# Patient Record
Sex: MALE | Race: WHITE | NOT HISPANIC OR LATINO
[De-identification: names, ages, dates, MRNs, and addresses within clinical notes are randomized per-mention and may not be internally consistent; named-entity substitution may affect disease eponyms.]

---

## 2017-01-09 ENCOUNTER — APPOINTMENT (OUTPATIENT)
Dept: PSYCHIATRY | Facility: CLINIC | Age: 82
End: 2017-01-09

## 2017-01-23 ENCOUNTER — APPOINTMENT (OUTPATIENT)
Dept: PSYCHIATRY | Facility: CLINIC | Age: 82
End: 2017-01-23

## 2017-01-25 ENCOUNTER — APPOINTMENT (OUTPATIENT)
Dept: PSYCHIATRY | Facility: CLINIC | Age: 82
End: 2017-01-25

## 2017-01-30 ENCOUNTER — APPOINTMENT (OUTPATIENT)
Dept: PSYCHIATRY | Facility: CLINIC | Age: 82
End: 2017-01-30

## 2017-02-01 ENCOUNTER — APPOINTMENT (OUTPATIENT)
Dept: PSYCHIATRY | Facility: CLINIC | Age: 82
End: 2017-02-01

## 2017-02-06 ENCOUNTER — APPOINTMENT (OUTPATIENT)
Dept: PSYCHIATRY | Facility: CLINIC | Age: 82
End: 2017-02-06

## 2017-02-07 ENCOUNTER — APPOINTMENT (OUTPATIENT)
Dept: PSYCHIATRY | Facility: CLINIC | Age: 82
End: 2017-02-07

## 2017-02-13 ENCOUNTER — APPOINTMENT (OUTPATIENT)
Dept: PSYCHIATRY | Facility: CLINIC | Age: 82
End: 2017-02-13

## 2017-02-14 ENCOUNTER — APPOINTMENT (OUTPATIENT)
Dept: PSYCHIATRY | Facility: CLINIC | Age: 82
End: 2017-02-14

## 2017-02-15 ENCOUNTER — APPOINTMENT (OUTPATIENT)
Dept: PSYCHIATRY | Facility: CLINIC | Age: 82
End: 2017-02-15

## 2017-02-17 ENCOUNTER — OTHER (OUTPATIENT)
Age: 82
End: 2017-02-17

## 2017-02-17 RX ORDER — D-METHORPHAN/PE/ACETAMINOPHEN 5-325MG/15
10-5-325 LIQUID (ML) ORAL EVERY 4 HOURS
Qty: 1 | Refills: 2 | Status: ACTIVE | COMMUNITY
Start: 2017-02-17 | End: 1900-01-01

## 2017-02-17 RX ORDER — TRAZODONE HYDROCHLORIDE 50 MG/1
50 TABLET ORAL
Qty: 30 | Refills: 3 | Status: ACTIVE | COMMUNITY
Start: 2017-02-17 | End: 1900-01-01

## 2017-02-20 ENCOUNTER — APPOINTMENT (OUTPATIENT)
Dept: PSYCHIATRY | Facility: CLINIC | Age: 82
End: 2017-02-20

## 2017-02-22 ENCOUNTER — APPOINTMENT (OUTPATIENT)
Dept: PSYCHIATRY | Facility: CLINIC | Age: 82
End: 2017-02-22

## 2017-02-27 ENCOUNTER — APPOINTMENT (OUTPATIENT)
Dept: PSYCHIATRY | Facility: CLINIC | Age: 82
End: 2017-02-27

## 2017-03-01 ENCOUNTER — APPOINTMENT (OUTPATIENT)
Dept: PSYCHIATRY | Facility: CLINIC | Age: 82
End: 2017-03-01

## 2017-03-06 ENCOUNTER — APPOINTMENT (OUTPATIENT)
Dept: PSYCHIATRY | Facility: CLINIC | Age: 82
End: 2017-03-06

## 2017-03-08 ENCOUNTER — APPOINTMENT (OUTPATIENT)
Dept: PSYCHIATRY | Facility: CLINIC | Age: 82
End: 2017-03-08

## 2017-03-13 ENCOUNTER — APPOINTMENT (OUTPATIENT)
Dept: PSYCHIATRY | Facility: CLINIC | Age: 82
End: 2017-03-13

## 2017-03-15 ENCOUNTER — APPOINTMENT (OUTPATIENT)
Dept: PSYCHIATRY | Facility: CLINIC | Age: 82
End: 2017-03-15

## 2017-03-20 ENCOUNTER — OTHER (OUTPATIENT)
Age: 82
End: 2017-03-20

## 2017-03-20 ENCOUNTER — APPOINTMENT (OUTPATIENT)
Dept: PSYCHIATRY | Facility: CLINIC | Age: 82
End: 2017-03-20

## 2017-03-22 ENCOUNTER — APPOINTMENT (OUTPATIENT)
Dept: PSYCHIATRY | Facility: CLINIC | Age: 82
End: 2017-03-22

## 2017-03-27 ENCOUNTER — APPOINTMENT (OUTPATIENT)
Dept: PSYCHIATRY | Facility: CLINIC | Age: 82
End: 2017-03-27

## 2017-03-29 ENCOUNTER — APPOINTMENT (OUTPATIENT)
Dept: PSYCHIATRY | Facility: CLINIC | Age: 82
End: 2017-03-29

## 2017-04-03 ENCOUNTER — APPOINTMENT (OUTPATIENT)
Dept: PSYCHIATRY | Facility: CLINIC | Age: 82
End: 2017-04-03

## 2017-04-05 ENCOUNTER — APPOINTMENT (OUTPATIENT)
Dept: PSYCHIATRY | Facility: CLINIC | Age: 82
End: 2017-04-05

## 2017-04-10 ENCOUNTER — APPOINTMENT (OUTPATIENT)
Dept: PSYCHIATRY | Facility: CLINIC | Age: 82
End: 2017-04-10

## 2017-04-12 ENCOUNTER — APPOINTMENT (OUTPATIENT)
Dept: PSYCHIATRY | Facility: CLINIC | Age: 82
End: 2017-04-12

## 2017-04-17 ENCOUNTER — APPOINTMENT (OUTPATIENT)
Dept: PSYCHIATRY | Facility: CLINIC | Age: 82
End: 2017-04-17

## 2017-04-19 ENCOUNTER — APPOINTMENT (OUTPATIENT)
Dept: PSYCHIATRY | Facility: CLINIC | Age: 82
End: 2017-04-19

## 2017-04-24 ENCOUNTER — APPOINTMENT (OUTPATIENT)
Dept: PSYCHIATRY | Facility: CLINIC | Age: 82
End: 2017-04-24

## 2017-04-26 ENCOUNTER — APPOINTMENT (OUTPATIENT)
Dept: PSYCHIATRY | Facility: CLINIC | Age: 82
End: 2017-04-26

## 2017-05-01 ENCOUNTER — APPOINTMENT (OUTPATIENT)
Dept: PSYCHIATRY | Facility: CLINIC | Age: 82
End: 2017-05-01

## 2017-05-01 ENCOUNTER — OUTPATIENT (OUTPATIENT)
Dept: OUTPATIENT SERVICES | Facility: HOSPITAL | Age: 82
LOS: 1 days | Discharge: HOME | End: 2017-05-01

## 2017-05-02 DIAGNOSIS — F41.1 GENERALIZED ANXIETY DISORDER: ICD-10-CM

## 2017-05-03 ENCOUNTER — APPOINTMENT (OUTPATIENT)
Dept: PSYCHIATRY | Facility: CLINIC | Age: 82
End: 2017-05-03

## 2017-05-08 ENCOUNTER — APPOINTMENT (OUTPATIENT)
Dept: PSYCHIATRY | Facility: CLINIC | Age: 82
End: 2017-05-08

## 2017-05-10 ENCOUNTER — APPOINTMENT (OUTPATIENT)
Dept: PSYCHIATRY | Facility: CLINIC | Age: 82
End: 2017-05-10

## 2017-05-15 ENCOUNTER — APPOINTMENT (OUTPATIENT)
Dept: PSYCHIATRY | Facility: CLINIC | Age: 82
End: 2017-05-15

## 2017-05-17 ENCOUNTER — APPOINTMENT (OUTPATIENT)
Dept: PSYCHIATRY | Facility: CLINIC | Age: 82
End: 2017-05-17

## 2017-05-31 ENCOUNTER — APPOINTMENT (OUTPATIENT)
Dept: PSYCHIATRY | Facility: CLINIC | Age: 82
End: 2017-05-31

## 2017-06-07 ENCOUNTER — APPOINTMENT (OUTPATIENT)
Dept: PSYCHIATRY | Facility: CLINIC | Age: 82
End: 2017-06-07

## 2017-07-11 ENCOUNTER — APPOINTMENT (OUTPATIENT)
Dept: PSYCHIATRY | Facility: CLINIC | Age: 82
End: 2017-07-11

## 2017-07-18 ENCOUNTER — APPOINTMENT (OUTPATIENT)
Dept: PSYCHIATRY | Facility: CLINIC | Age: 82
End: 2017-07-18

## 2017-07-25 ENCOUNTER — APPOINTMENT (OUTPATIENT)
Dept: PSYCHIATRY | Facility: CLINIC | Age: 82
End: 2017-07-25

## 2017-08-01 ENCOUNTER — APPOINTMENT (OUTPATIENT)
Dept: PSYCHIATRY | Facility: CLINIC | Age: 82
End: 2017-08-01

## 2017-08-08 ENCOUNTER — APPOINTMENT (OUTPATIENT)
Dept: PSYCHIATRY | Facility: CLINIC | Age: 82
End: 2017-08-08

## 2017-08-15 ENCOUNTER — APPOINTMENT (OUTPATIENT)
Dept: PSYCHIATRY | Facility: CLINIC | Age: 82
End: 2017-08-15

## 2017-08-22 ENCOUNTER — APPOINTMENT (OUTPATIENT)
Dept: PSYCHIATRY | Facility: CLINIC | Age: 82
End: 2017-08-22

## 2017-08-29 ENCOUNTER — APPOINTMENT (OUTPATIENT)
Dept: PSYCHIATRY | Facility: CLINIC | Age: 82
End: 2017-08-29

## 2017-09-05 ENCOUNTER — APPOINTMENT (OUTPATIENT)
Dept: PSYCHIATRY | Facility: CLINIC | Age: 82
End: 2017-09-05

## 2017-09-12 ENCOUNTER — APPOINTMENT (OUTPATIENT)
Dept: PSYCHIATRY | Facility: CLINIC | Age: 82
End: 2017-09-12

## 2017-09-19 ENCOUNTER — APPOINTMENT (OUTPATIENT)
Dept: PSYCHIATRY | Facility: CLINIC | Age: 82
End: 2017-09-19

## 2017-09-26 ENCOUNTER — APPOINTMENT (OUTPATIENT)
Dept: PSYCHIATRY | Facility: CLINIC | Age: 82
End: 2017-09-26

## 2017-10-03 ENCOUNTER — APPOINTMENT (OUTPATIENT)
Dept: PSYCHIATRY | Facility: CLINIC | Age: 82
End: 2017-10-03

## 2017-10-10 ENCOUNTER — APPOINTMENT (OUTPATIENT)
Dept: PSYCHIATRY | Facility: CLINIC | Age: 82
End: 2017-10-10

## 2017-10-17 ENCOUNTER — APPOINTMENT (OUTPATIENT)
Dept: PSYCHIATRY | Facility: CLINIC | Age: 82
End: 2017-10-17

## 2017-10-24 ENCOUNTER — APPOINTMENT (OUTPATIENT)
Dept: PSYCHIATRY | Facility: CLINIC | Age: 82
End: 2017-10-24

## 2017-10-31 ENCOUNTER — APPOINTMENT (OUTPATIENT)
Dept: PSYCHIATRY | Facility: CLINIC | Age: 82
End: 2017-10-31

## 2017-11-06 VITALS
WEIGHT: 179.9 LBS | DIASTOLIC BLOOD PRESSURE: 89 MMHG | OXYGEN SATURATION: 100 % | SYSTOLIC BLOOD PRESSURE: 149 MMHG | HEART RATE: 97 BPM | TEMPERATURE: 98 F | RESPIRATION RATE: 18 BRPM

## 2017-11-06 LAB
ALBUMIN SERPL ELPH-MCNC: 3.4 G/DL — SIGNIFICANT CHANGE UP (ref 3.3–5)
ALP SERPL-CCNC: 49 U/L — SIGNIFICANT CHANGE UP (ref 40–120)
ALT FLD-CCNC: 17 U/L — SIGNIFICANT CHANGE UP (ref 10–45)
ANION GAP SERPL CALC-SCNC: 12 MMOL/L — SIGNIFICANT CHANGE UP (ref 5–17)
APTT BLD: 24.7 SEC — LOW (ref 27.5–37.4)
AST SERPL-CCNC: 25 U/L — SIGNIFICANT CHANGE UP (ref 10–40)
BASOPHILS NFR BLD AUTO: 0.3 % — SIGNIFICANT CHANGE UP (ref 0–2)
BILIRUB SERPL-MCNC: 0.3 MG/DL — SIGNIFICANT CHANGE UP (ref 0.2–1.2)
BLD GP AB SCN SERPL QL: NEGATIVE — SIGNIFICANT CHANGE UP
BUN SERPL-MCNC: 26 MG/DL — HIGH (ref 7–23)
CALCIUM SERPL-MCNC: 8.5 MG/DL — SIGNIFICANT CHANGE UP (ref 8.4–10.5)
CHLORIDE SERPL-SCNC: 102 MMOL/L — SIGNIFICANT CHANGE UP (ref 96–108)
CO2 SERPL-SCNC: 26 MMOL/L — SIGNIFICANT CHANGE UP (ref 22–31)
CREAT SERPL-MCNC: 1.12 MG/DL — SIGNIFICANT CHANGE UP (ref 0.5–1.3)
EOSINOPHIL NFR BLD AUTO: 0.6 % — SIGNIFICANT CHANGE UP (ref 0–6)
GLUCOSE SERPL-MCNC: 125 MG/DL — HIGH (ref 70–99)
HCT VFR BLD CALC: 18.6 % — CRITICAL LOW (ref 39–50)
HGB BLD-MCNC: 6.3 G/DL — CRITICAL LOW (ref 13–17)
INR BLD: 0.99 — SIGNIFICANT CHANGE UP (ref 0.88–1.16)
LYMPHOCYTES # BLD AUTO: 15.1 % — SIGNIFICANT CHANGE UP (ref 13–44)
MCHC RBC-ENTMCNC: 33.9 G/DL — SIGNIFICANT CHANGE UP (ref 32–36)
MCHC RBC-ENTMCNC: 39.9 PG — HIGH (ref 27–34)
MCV RBC AUTO: 117.7 FL — HIGH (ref 80–100)
MONOCYTES NFR BLD AUTO: 13.4 % — SIGNIFICANT CHANGE UP (ref 2–14)
NEUTROPHILS NFR BLD AUTO: 70.6 % — SIGNIFICANT CHANGE UP (ref 43–77)
OB PNL STL: POSITIVE
PLATELET # BLD AUTO: 514 K/UL — HIGH (ref 150–400)
POTASSIUM SERPL-MCNC: 4.2 MMOL/L — SIGNIFICANT CHANGE UP (ref 3.5–5.3)
POTASSIUM SERPL-SCNC: 4.2 MMOL/L — SIGNIFICANT CHANGE UP (ref 3.5–5.3)
PROT SERPL-MCNC: 6.3 G/DL — SIGNIFICANT CHANGE UP (ref 6–8.3)
PROTHROM AB SERPL-ACNC: 11 SEC — SIGNIFICANT CHANGE UP (ref 9.8–12.7)
RBC # BLD: 1.58 M/UL — LOW (ref 4.2–5.8)
RBC # FLD: 15 % — SIGNIFICANT CHANGE UP (ref 10.3–16.9)
RH IG SCN BLD-IMP: POSITIVE — SIGNIFICANT CHANGE UP
SODIUM SERPL-SCNC: 140 MMOL/L — SIGNIFICANT CHANGE UP (ref 135–145)
WBC # BLD: 7.1 K/UL — SIGNIFICANT CHANGE UP (ref 3.8–10.5)
WBC # FLD AUTO: 7.1 K/UL — SIGNIFICANT CHANGE UP (ref 3.8–10.5)

## 2017-11-06 PROCEDURE — 93010 ELECTROCARDIOGRAM REPORT: CPT

## 2017-11-06 PROCEDURE — 99285 EMERGENCY DEPT VISIT HI MDM: CPT | Mod: 25

## 2017-11-06 PROCEDURE — 99282 EMERGENCY DEPT VISIT SF MDM: CPT

## 2017-11-06 RX ORDER — TEMAZEPAM 15 MG/1
1 CAPSULE ORAL
Qty: 0 | Refills: 0 | COMMUNITY

## 2017-11-06 RX ORDER — PANTOPRAZOLE SODIUM 20 MG/1
80 TABLET, DELAYED RELEASE ORAL ONCE
Qty: 0 | Refills: 0 | Status: COMPLETED | OUTPATIENT
Start: 2017-11-06 | End: 2017-11-06

## 2017-11-06 RX ORDER — SODIUM CHLORIDE 9 MG/ML
250 INJECTION INTRAMUSCULAR; INTRAVENOUS; SUBCUTANEOUS ONCE
Qty: 0 | Refills: 0 | Status: COMPLETED | OUTPATIENT
Start: 2017-11-06 | End: 2017-11-06

## 2017-11-06 RX ORDER — PANTOPRAZOLE SODIUM 20 MG/1
8 TABLET, DELAYED RELEASE ORAL
Qty: 80 | Refills: 0 | Status: DISCONTINUED | OUTPATIENT
Start: 2017-11-06 | End: 2017-11-08

## 2017-11-06 RX ADMIN — PANTOPRAZOLE SODIUM 80 MILLIGRAM(S): 20 TABLET, DELAYED RELEASE ORAL at 22:43

## 2017-11-06 RX ADMIN — SODIUM CHLORIDE 250 MILLILITER(S): 9 INJECTION INTRAMUSCULAR; INTRAVENOUS; SUBCUTANEOUS at 23:09

## 2017-11-06 RX ADMIN — PANTOPRAZOLE SODIUM 10 MG/HR: 20 TABLET, DELAYED RELEASE ORAL at 22:43

## 2017-11-06 NOTE — ED ADULT NURSE NOTE - OBJECTIVE STATEMENT
pt BIBA to ED A&Ox3 c/o dark stools since Friday. pt also reports 2 episodes of vomiting. states he was sent by his doctor for hemoglobin of 6. denies cp/sob, headache, dizziness, diarrhea, bloody emesis. denies taking blood thinners.

## 2017-11-06 NOTE — ED PROVIDER NOTE - TEMPLATE, MLM
After Visit Summary   11/6/2017    Erin Corcoran    MRN: [id number]           Patient Information     Date Of Birth          1963        Visit Information        Provider Department      11/6/2017 3:15 PM Anshul Castaneda MD [facility]        Today's Diagnoses     Leg length difference, acquired    -  1       Follow-ups after your visit        Additional Services     ORTHOTICS REFERRAL (external)       **This referral order prints off in the [facility]  (Orthotics & Prosthetics) Central Scheduling Office**    The [facility] Central Scheduling Staff will contact the patient to schedule appointments.     Central Scheduling Contact Information: [phone] ([place])  AFO    Please be aware that coverage of these services is subject to the terms and limitations of your health insurance plan.  Call member services at your health plan with any benefit or coverage questions.      Please bring the following to your appointment:    >>   Any x-rays, CTs or MRIs which have been performed.  Contact the facility where they were done to arrange for  prior to your scheduled appointment.    >>   List of current medications   >>   This referral request   >>   Any documents/labs given to you for this referral                  Your next 10 appointments already scheduled     Nov 13, 2017  1:30 PM CST   (Arrive by 1:15 PM)   Return Visit with MERE Whittington CNP   [facility] ([facility])    [address] [place] [id number] [phone]            Dec 18, 2017 11:45 AM CST   (Arrive by 11:30 AM)   Return Visit with Anshul Castaneda MD   [facility] ([facility])    [address] [place] [id number] [phone]              Who to contact     Please call your clinic at [phone] to:    Ask questions about your  health    Make or cancel appointments    Discuss your medicines    Learn about your test results    Speak to your doctor   If you have compliments or concerns about an experience at your clinic, or if you wish to file a complaint, please contact [facility] Patient Relations at [phone] or email us at [email or web address]         Additional Information About Your Visit        Ikonopediahart Information     Conversocialt is an electronic gateway that provides easy, online access to your medical records. With [employer], you can request a clinic appointment, read your test results, renew a prescription or communicate with your care team.     To sign up for [employer] visit the website at [email or web address]   You will be asked to enter the access code listed below, as well as some personal information. Please follow the directions to create your username and password.     Your access code is: [email or web address]  Expires: 2018  3:27 PM     Your access code will  in 90 days. If you need help or a new code, please contact your [facility] or call [phone] for assistance.        Care EveryWhere ID     This is your Care EveryWhere ID. This could be used by other organizations to access your [facility] medical records  [id number]         Blood Pressure from Last 3 Encounters:   10/27/17 148/82   10/24/17 (!) 156/96    Weight from Last 3 Encounters:   10/26/17 97.4 kg (214 lb 11.7 oz)   10/24/17 97.9 kg (215 lb 14.4 oz)   10/23/17 97.1 kg (214 lb)              We Performed the Following     ORTHOTICS REFERRAL (external)          Today's Medication Changes          These changes are accurate as of: 17 11:59 PM.  If you have any questions, ask your nurse or doctor.               Start taking these medicines.        Dose/Directions    traMADol 50 MG tablet   Commonly known as:  ULTRAM   Used for:  Leg length difference, acquired   Started by:  Anshul Castaneda  Abdominal Pain, N/V/D MD Hardy        Dose:  50 mg   Take 1 tablet (50 mg) by mouth every 6 hours as needed for moderate pain   Quantity:  40 tablet   Refills:  0            Where to get your medicines      Some of these will need a paper prescription and others can be bought over the counter.  Ask your nurse if you have questions.     Bring a paper prescription for each of these medications     traMADol 50 MG tablet                Primary Care Provider Fax #    Provider Not In System [phone]                Equal Access to Services     BLADE VILLANUEVA : Hadii natanael gandhio Solarry, waaxda luqadaha, qaybta kaalmada wilmararonda, marlen le navcatracho ericmaríabrie mauro . So [facility] [phone].    ATENCIÓN: Si eduardo ayers, tiene a hollis disposición servicios gratuitos de asistencia lingüística. Llame al [phone].    We comply with applicable federal civil rights laws and [place] laws. We do not discriminate on the basis of race, color, national origin, age, disability, sex, sexual orientation, or gender identity.            Thank you!     Thank you for choosing [facility]  for your care. Our goal is always to provide you with excellent care. Hearing back from our patients is one way we can continue to improve our services. Please take a few minutes to complete the written survey that you may receive in the mail after your visit with us. Thank you!             Your Updated Medication List - Protect others around you: Learn how to safely use, store and throw away your medicines at www.disposemymeds.org.          This list is accurate as of: 11/6/17 11:59 PM.  Always use your most recent med list.                   Brand Name Dispense Instructions for use Diagnosis    acetaminophen 325 MG tablet    TYLENOL    100 tablet    Take 2 tablets (650 mg) by mouth every 4 hours as needed for other (surgical pain)    Status post osteotomy       aspirin 325 MG EC tablet     28 tablet    Take 1 tablet (325 mg) by mouth daily for  28 days    Status post osteotomy       gabapentin 300 MG capsule    NEURONTIN    28 capsule    Take 1 capsule (300 mg) by mouth 2 times daily for 14 days    Status post osteotomy       oxyCODONE IR 5 MG tablet    ROXICODONE    70 tablet    Take 1-2 tablets (5-10 mg) by mouth every 3 hours as needed for moderate to severe pain    Status post osteotomy       senna-docusate 8.6-50 MG per tablet    SENOKOT-S;PERICOLACE    40 tablet    Take 1-2 tablets by mouth 2 times daily as needed for constipation    Status post osteotomy       traMADol 50 MG tablet    ULTRAM    40 tablet    Take 1 tablet (50 mg) by mouth every 6 hours as needed for moderate pain    Leg length difference, acquired

## 2017-11-06 NOTE — ED PROVIDER NOTE - OBJECTIVE STATEMENT
89 M co dark stool- dark stool x 3-4 days- nonspecific abd quesiness/discomfort  with nausea- emesis x 2 tonight - no black/bloody emesis  no f/c  no cp no sob  no anticoagulants  no exac/allev factors  hgb 30 d ago was 10.8 89 M co dark stool- dark stool x 3-4 days-hx of AS, CAD, L foot drop, nonspecific abd quesiness/discomfort  with nausea- emesis x 2 tonight - no black/bloody emesis  no f/c  no cp no sob  no anticoagulants  no exac/allev factors  hgb 30 d ago was 10.8

## 2017-11-06 NOTE — ED ADULT TRIAGE NOTE - ARRIVAL INFO ADDITIONAL COMMENTS
pt reports dark stool X 2 days, last stool this am. denies nausea, vomiting. noted with dark colored food? around his mouth, states "it's from the ensure", as per aide, labs today show hemoglobin of 6.0, pt denies dizziness, chest pain, sob, abd pain, syncope. denies use of blood thinners

## 2017-11-06 NOTE — ED ADULT NURSE NOTE - CHPI ED SYMPTOMS NEG
no vomiting/no nausea/no numbness/no pain/no dizziness/no chills/no fever/no tingling/no decreased eating/drinking/no weakness no vomiting/no nausea/no numbness/no dizziness/no decreased eating/drinking/no chills/no pain/no fever/no tingling

## 2017-11-07 ENCOUNTER — APPOINTMENT (OUTPATIENT)
Dept: PSYCHIATRY | Facility: CLINIC | Age: 82
End: 2017-11-07

## 2017-11-07 ENCOUNTER — INPATIENT (INPATIENT)
Facility: HOSPITAL | Age: 82
LOS: 1 days | Discharge: HOME HEALTH | DRG: 378 | End: 2017-11-09
Attending: STUDENT IN AN ORGANIZED HEALTH CARE EDUCATION/TRAINING PROGRAM | Admitting: STUDENT IN AN ORGANIZED HEALTH CARE EDUCATION/TRAINING PROGRAM
Payer: MEDICARE

## 2017-11-07 DIAGNOSIS — I25.10 ATHEROSCLEROTIC HEART DISEASE OF NATIVE CORONARY ARTERY WITHOUT ANGINA PECTORIS: ICD-10-CM

## 2017-11-07 DIAGNOSIS — F32.9 MAJOR DEPRESSIVE DISORDER, SINGLE EPISODE, UNSPECIFIED: ICD-10-CM

## 2017-11-07 DIAGNOSIS — K92.2 GASTROINTESTINAL HEMORRHAGE, UNSPECIFIED: ICD-10-CM

## 2017-11-07 DIAGNOSIS — I71.4 ABDOMINAL AORTIC ANEURYSM, WITHOUT RUPTURE: ICD-10-CM

## 2017-11-07 DIAGNOSIS — Z29.9 ENCOUNTER FOR PROPHYLACTIC MEASURES, UNSPECIFIED: ICD-10-CM

## 2017-11-07 DIAGNOSIS — R63.8 OTHER SYMPTOMS AND SIGNS CONCERNING FOOD AND FLUID INTAKE: ICD-10-CM

## 2017-11-07 DIAGNOSIS — D64.9 ANEMIA, UNSPECIFIED: ICD-10-CM

## 2017-11-07 DIAGNOSIS — D47.3 ESSENTIAL (HEMORRHAGIC) THROMBOCYTHEMIA: ICD-10-CM

## 2017-11-07 DIAGNOSIS — K58.9 IRRITABLE BOWEL SYNDROME WITHOUT DIARRHEA: ICD-10-CM

## 2017-11-07 DIAGNOSIS — I35.0 NONRHEUMATIC AORTIC (VALVE) STENOSIS: ICD-10-CM

## 2017-11-07 LAB
ANION GAP SERPL CALC-SCNC: 10 MMOL/L — SIGNIFICANT CHANGE UP (ref 5–17)
BUN SERPL-MCNC: 23 MG/DL — SIGNIFICANT CHANGE UP (ref 7–23)
CALCIUM SERPL-MCNC: 8.1 MG/DL — LOW (ref 8.4–10.5)
CHLORIDE SERPL-SCNC: 104 MMOL/L — SIGNIFICANT CHANGE UP (ref 96–108)
CO2 SERPL-SCNC: 26 MMOL/L — SIGNIFICANT CHANGE UP (ref 22–31)
CREAT SERPL-MCNC: 1.13 MG/DL — SIGNIFICANT CHANGE UP (ref 0.5–1.3)
FERRITIN SERPL-MCNC: 57.9 NG/ML — SIGNIFICANT CHANGE UP (ref 30–400)
FOLATE SERPL-MCNC: >20 NG/ML — SIGNIFICANT CHANGE UP (ref 4.8–24.2)
GLUCOSE SERPL-MCNC: 106 MG/DL — HIGH (ref 70–99)
HCT VFR BLD CALC: 24.2 % — LOW (ref 39–50)
HGB BLD-MCNC: 8.1 G/DL — LOW (ref 13–17)
IRON SATN MFR SERPL: 129 UG/DL — SIGNIFICANT CHANGE UP (ref 45–165)
IRON SATN MFR SERPL: 56 % — HIGH (ref 16–55)
MAGNESIUM SERPL-MCNC: 2.4 MG/DL — SIGNIFICANT CHANGE UP (ref 1.6–2.6)
MCHC RBC-ENTMCNC: 33.5 G/DL — SIGNIFICANT CHANGE UP (ref 32–36)
MCHC RBC-ENTMCNC: 34.6 PG — HIGH (ref 27–34)
MCV RBC AUTO: 103.4 FL — HIGH (ref 80–100)
PLATELET # BLD AUTO: 470 K/UL — HIGH (ref 150–400)
POTASSIUM SERPL-MCNC: 4.3 MMOL/L — SIGNIFICANT CHANGE UP (ref 3.5–5.3)
POTASSIUM SERPL-SCNC: 4.3 MMOL/L — SIGNIFICANT CHANGE UP (ref 3.5–5.3)
RBC # BLD: 2.34 M/UL — LOW (ref 4.2–5.8)
SODIUM SERPL-SCNC: 140 MMOL/L — SIGNIFICANT CHANGE UP (ref 135–145)
TIBC SERPL-MCNC: 231 UG/DL — SIGNIFICANT CHANGE UP (ref 220–430)
UIBC SERPL-MCNC: 102 UG/DL — LOW (ref 110–370)
VIT B12 SERPL-MCNC: 385 PG/ML — SIGNIFICANT CHANGE UP (ref 243–894)
WBC # BLD: 6.2 K/UL — SIGNIFICANT CHANGE UP (ref 3.8–10.5)
WBC # FLD AUTO: 6.2 K/UL — SIGNIFICANT CHANGE UP (ref 3.8–10.5)

## 2017-11-07 PROCEDURE — 74174 CTA ABD&PLVS W/CONTRAST: CPT | Mod: 26

## 2017-11-07 PROCEDURE — 99223 1ST HOSP IP/OBS HIGH 75: CPT | Mod: AI,GC

## 2017-11-07 PROCEDURE — 99223 1ST HOSP IP/OBS HIGH 75: CPT | Mod: GC

## 2017-11-07 PROCEDURE — 45380 COLONOSCOPY AND BIOPSY: CPT

## 2017-11-07 PROCEDURE — 99222 1ST HOSP IP/OBS MODERATE 55: CPT | Mod: 25

## 2017-11-07 RX ORDER — HYDRALAZINE HCL 50 MG
10 TABLET ORAL ONCE
Qty: 0 | Refills: 0 | Status: COMPLETED | OUTPATIENT
Start: 2017-11-07 | End: 2017-11-07

## 2017-11-07 RX ORDER — SOD SULF/SODIUM/NAHCO3/KCL/PEG
4000 SOLUTION, RECONSTITUTED, ORAL ORAL ONCE
Qty: 0 | Refills: 0 | Status: COMPLETED | OUTPATIENT
Start: 2017-11-07 | End: 2017-11-07

## 2017-11-07 RX ADMIN — PANTOPRAZOLE SODIUM 10 MG/HR: 20 TABLET, DELAYED RELEASE ORAL at 08:43

## 2017-11-07 RX ADMIN — Medication 10 MILLIGRAM(S): at 19:10

## 2017-11-07 RX ADMIN — PANTOPRAZOLE SODIUM 10 MG/HR: 20 TABLET, DELAYED RELEASE ORAL at 18:40

## 2017-11-07 RX ADMIN — Medication 4000 MILLILITER(S): at 18:40

## 2017-11-07 NOTE — H&P ADULT - ATTENDING COMMENTS
Pt. seen and examined by me w/ housestaff at 12PM.  Nephew at bedside.  89 y/o M w/ CAD (not on ASA), ET, depression, IBS c/o melena, intermittent abdominal pain, and emesis x2 episodes (NB, NB).  ROS otherwise negative; he denies CP, SOB, fatigue, lightheadedness, LOC, BRBPR, hematemesis.  VS 97.5 158/75 68 18 95% on RA.  Exam as per PGY-2; Pt. A&Ox3 in NAD, well-appearing, anicteric, no pallor, abdomen soft NT ND; FOBT+ per H.S. exam.  Labs reviewed; Hb 6.3 --> 8.1 (s/p PRBC x2).  Images reviewed.  (1) Acute blood loss anemia -- d/t suspected upper GI blood loss; GI consulted, appreciate recs; plan for EGD later today; cont. IV PPI gtt; Hb improved s/p PRBC transfusion x2; VSS and no further bleeding sx; monitor CBC  (2) AAA -- Vascular consult  (3) ET -- holding hydroxyurea; monitor CBC; will try to obtain collateral from outpatient hematologist ([facility])  (4) Depression -- restart SSRI after EGD  (5) CAD -- clarify home CV rx; holding ASA in setting of GI-bleed work-up  (6) Dispo -- pending EGD

## 2017-11-07 NOTE — H&P ADULT - HISTORY OF PRESENT ILLNESS
89M PMH CAD w/ 2 stents (~10yrs ago, not on ACE/BB/statin/ASA because he was told to d/c by doctors), *upon chart review: saccular abdominal aortic aneurysm (5cm found on CT 1yr ago at 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 Pt has been having 2-3d of dark stool (diarrheal at times) and abdominal ache associated w/ 2ep of vomitus NBNB. He was subsequently seen by his PCP who damien blood work which subsequently resulted in a Hb of 6 at which time he was told to come to the ED. Pt denies lightheadedness/dizziness, CP, chest pressure/tightness, dyspnea, palpitations. Pt denies abdominal pain and nausea at this time. He has these sx in the past and has not had c-scope or EGD that he can remember. Pt states that he takes tylenol at time for pain, no NSAID use. He rarely drinks now, maybe 1 drink 1x/mo. He notes increased stress since the election given subsequent current events.    ED VS: Tmax 99.5F, HR 60s-90s, -161/60s-80s, RR 18, O2 sat 100% on RA  orthostatics checked: lying down 129/66 w/ HR 72; sitting 147/57 w/ HR 63             6.3    7.1   )-----------( 514      ( 06 Nov 2017 22:16 )             18.6   140  |  102  |  26<H>  ----------------------------<  125<H>  4.2   |  26  |  1.12  Ca    8.5      06 Nov 2017 22:16  TPro  6.3  /  Alb  3.4  /  TBili  0.3  /  DBili  x   /  AST  25  /  ALT  17  /  AlkPhos  49  11-06  FOBT positive    seen by ICU consult found to have melena on LICO; given pt not orthostatic, VSS, and pt comfortable AAOx3, pt deemed stable for admission to [facility]  -given ppi bolus and started on protonix gtt. also given 250cc NS bolus and ordered for 2U PRBCs    CT scan from 9/2016: 5cm distal saccular abdominal aortic aneurysm infrarenal that is increased in size since prior study in 2014

## 2017-11-07 NOTE — H&P ADULT - PROBLEM SELECTOR PLAN 2
-f/u repeat CT  -consult vascular in AM as pt clinically stable at this time  -attempted to contact Jaime Mckoy NP for collateral; please contact in AM

## 2017-11-07 NOTE — CONSULT NOTE ADULT - ATTENDING COMMENTS
This patient was evaluated in the ED with the resident and management decisions were made, see above for the details.  I agree with the A/P.  The patient was seen severel times in the ED and his vital signs were stable, he was a/o x3 and his last BM was this morning.  -abdominal pain  -acute hemorrhagic anemia  -UGIB  >keep SO2 above 04%  >IVF  >NPO for possible scope in the am.  >GI to follow  >H/H q4hrs and transfuse PRBC to keep hgb 7 to 8g/dL  >SCD  this patient can be managed on the floors.  You may reconsult us as needed.

## 2017-11-07 NOTE — H&P ADULT - PROBLEM SELECTOR PLAN 5
no murmur appreciated at this time  -please obtain collateral from OP Cardio as per pt -  not on ASA, beta blocker, ACE/ARB, or statin as he was told it was not needed  -contact Dr. Mejia and Jaime Mckoy NP for collateral (contact info listed above)

## 2017-11-07 NOTE — H&P ADULT - PROBLEM SELECTOR PLAN 1
found to have Hb 6.3 and melanotic stool on LICO; active bleed suspected; upon chart review 5cm saccular AAA noted on CT 1yr ago w/o known intervention; also h/o essential thrombocytosis w/ hydroxyurea use; possible causes of UGIB include stress vs hydroxyurea use vs primary aortoenteric fistula  -ordering CT A/P w/ IV contrast to rule out PAEF, will f/u  -holding home hydroxyurea  -c/w protonix gtt; s/p ppi bolus  -transfuse 2U PRBC  -consult GI in AM given pt clinically stable at this time  -if found to have PAEF, please contact vascular

## 2017-11-07 NOTE — H&P ADULT - NSHPOUTPATIENTPROVIDERS_GEN_ALL_CORE
PCP: Jaime Mckoy (NP): cell - [phone], home - [phone]  Heme: Dr. Alvarado ([facility])  Cardio: Dr. Dave Mejia ([facility])  Pharm: [facility] on [address]

## 2017-11-07 NOTE — CONSULT NOTE ADULT - SUBJECTIVE AND OBJECTIVE BOX
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   According to patient he has been having black stools x1 week, sometimes formed/tarry, associated with mild abdominal discomfort (described as an ache, 4/10, intermittent, nil identified aggravating/alleviating factors, nil radiation), and emesis (2x on Friday, non bloody, non bilious). Patient otherwise denies BRBPR, coffee grounds in his emesis, nausea, fever, chills, NSAID use, constipation, straining during defecation, chest pain, palpitations diaphoresis, SOB/CALDWELL, excessive ethanol use.      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)  AS  L foot drop   h/o spinal fusion    REVIEW OF SYSTEMS  Apart from items noted in the HPI a 10point ROS was negative  	    MEDICATIONS  (STANDING):  pantoprazole Infusion 8 mG/Hr (10 mL/Hr) IV Continuous <Continuous>    MEDICATIONS  (PRN):      Allergies  No Known Allergies    Intolerances        SOCIAL HISTORY:  Denies any toxic or illicit habits    FAMILY HISTORY:  Denies any FHx of colon, stomach or pancreatic cancer    Vital Signs Last 24 Hrs  T(C): 36.4 (07 Nov 2017 09:18), Max: 37.5 (07 Nov 2017 00:41)  T(F): 97.5 (07 Nov 2017 09:18), Max: 99.5 (07 Nov 2017 00:41)  HR: 68 (07 Nov 2017 09:18) (63 - 97)  BP: 158/75 (07 Nov 2017 09:18) (129/66 - 161/72)  BP(mean): --  RR: 18 (07 Nov 2017 09:18) (17 - 18)  SpO2: 95% (07 Nov 2017 09:18) (95% - 100%)    PHYSICAL EXAM:  GEN - elderly M lying in bed in no distress, anicteric, mild conjunctival pallor  Respiratory: good air entry  Cardiovascular: s1 s2 no M RRR  Gastrointestinal: full, soft, BS+, NT, NR, NG, no masses or organs palpated  Rectal: black formed stool in rectal vault  Extremities: no edema  Neurological: AAOx3 non focal      LABS:                    8.1    6.2   )-----------( 470      ( 07 Nov 2017 06:48 )             24.2     11-07    140  |  104  |  23  ----------------------------<  106<H>  4.3   |  26  |  1.13    Ca    8.1<L>      07 Nov 2017 06:50  Mg     2.4     11-07    TPro  6.3  /  Alb  3.4  /  TBili  0.3  /  DBili  x   /  AST  25  /  ALT  17  /  AlkPhos  49  11-06    PT/INR - ( 06 Nov 2017 22:16 )   PT: 11.0 sec;   INR: 0.99       PTT - ( 06 Nov 2017 22:16 )  PTT:24.7 sec      RADIOLOGY & ADDITIONAL STUDIES:  < from: CT Angio Abdomen and Pelvis w/Cont (11.07.17 @ 04:05) >  IMPRESSION:  Cardiac disease.  Pulmonary arterial hypertension.  5.4 cm saccular aneurysm in lower abdominal aorta. 3 cm left common iliac artery aneurysm. 2.1 cm right common iliac artery aneurysm. Less than 2 mm change in size compared to September 2016.  No lower thoracic or abdominal aortic dissection. No leak.  Stable postoperative changes of thoraco-lumbar fusion.  Minor findings described above.

## 2017-11-07 NOTE — H&P ADULT - NSHPLABSRESULTS_GEN_ALL_CORE
6.3    7.1   )-----------( 514      ( 06 Nov 2017 22:16 )             18.6   11-06    140  |  102  |  26<H>  ----------------------------<  125<H>  4.2   |  26  |  1.12    Ca    8.5      06 Nov 2017 22:16    TPro  6.3  /  Alb  3.4  /  TBili  0.3  /  DBili  x   /  AST  25  /  ALT  17  /  AlkPhos  49  11-06    PT/INR - ( 06 Nov 2017 22:16 )   PT: 11.0 sec;   INR: 0.99     PTT - ( 06 Nov 2017 22:16 )  PTT:24.7 sec    EKG NSR

## 2017-11-07 NOTE — H&P ADULT - PROBLEM SELECTOR PLAN 6
no HSQ at this time given active UGIB no murmur appreciated at this time  -please obtain collateral from OP Cardio

## 2017-11-07 NOTE — CONSULT NOTE ADULT - SUBJECTIVE AND OBJECTIVE BOX
Attending: Christopher    HPI: Mr. Miranda is a 89 M with CAD s/p stents x2 (~10 yrs ago), AS, essential thrombocytosis, hx of spinal fusion, and known infrarenal AAA, admitted to Medicine for GI bleed.     Pt presented with melena x1 week and Hb 6.3, responsive to RBC transfusion. CT scan today showed infrarenal AAA measuring 5.4 cm (from 5.2 9/2016), L common iliac artery aneurysm of 3 cm (from 2.8 cm), and R common iliac aneurysm 2.1 cm (from 2.1 cm). He had an EGD this afternoon that was WNL. Initially refusing colonoscopy but now agreeable. He denies fever, chills, chest pain, dyspnea, BRBPR. He had some abdominal pain but this is improved.        PAST MEDICAL & SURGICAL HISTORY:      MEDICATIONS  (STANDING):  hydrALAZINE 10 milliGRAM(s) Oral once  pantoprazole Infusion 8 mG/Hr (10 mL/Hr) IV Continuous <Continuous>    MEDICATIONS  (PRN):      Allergies    No Known Allergies    Intolerances        SOCIAL HISTORY:    FAMILY HISTORY:      REVIEW OF SYSTEMS  As per HPI    Vital Signs Last 24 Hrs  T(C): 37 (07 Nov 2017 17:57), Max: 37.5 (07 Nov 2017 00:41)  T(F): 98.6 (07 Nov 2017 17:57), Max: 99.5 (07 Nov 2017 00:41)  HR: 64 (07 Nov 2017 17:57) (63 - 97)  BP: 183/82 (07 Nov 2017 17:57) (129/66 - 187/73)  BP(mean): --  RR: 16 (07 Nov 2017 17:57) (16 - 18)  SpO2: 95% (07 Nov 2017 17:57) (94% - 100%)    I&O's Summary    06 Nov 2017 07:01  -  07 Nov 2017 07:00  --------------------------------------------------------  IN: 50 mL / OUT: 0 mL / NET: 50 mL        Physical Exam:  General: NAD, resting comfortably  HEENT: NC/AT, EOMI, normal hearing  Pulmonary: normal resp effort  Abdominal: soft, NT/ND, no organomegaly  Extremities: WWP, normal strength, no clubbing/cyanosis/edema, 2+ DP pulses  Neuro: A/O x 3, CNs II-XII grossly intact, normal sensation, no focal deficits    LABS:                        8.1    6.2   )-----------( 470      ( 07 Nov 2017 06:48 )             24.2     11-07    140  |  104  |  23  ----------------------------<  106<H>  4.3   |  26  |  1.13    Ca    8.1<L>      07 Nov 2017 06:50  Mg     2.4     11-07    TPro  6.3  /  Alb  3.4  /  TBili  0.3  /  DBili  x   /  AST  25  /  ALT  17  /  AlkPhos  49  11-06    PT/INR - ( 06 Nov 2017 22:16 )   PT: 11.0 sec;   INR: 0.99          PTT - ( 06 Nov 2017 22:16 )  PTT:24.7 sec    CAPILLARY BLOOD GLUCOSE        LIVER FUNCTIONS - ( 06 Nov 2017 22:16 )  Alb: 3.4 g/dL / Pro: 6.3 g/dL / ALK PHOS: 49 U/L / ALT: 17 U/L / AST: 25 U/L / GGT: x             Cultures:      RADIOLOGY & ADDITIONAL STUDIES:  CTA: 5.4 cm saccular aneurysm in lower abdominal aorta. 3 cm left common iliac   artery aneurysm. 2.1 cm right common iliac artery aneurysm. Less than 2   mm change in size compared to September 2016.  No lower thoracic or abdominal aortic dissection. No leak.

## 2017-11-07 NOTE — CONSULT NOTE ADULT - ASSESSMENT
89 yo M with history as above presents with 4 day c/o abdominal pain with accompanying UGIB with melanotic stools. Pt at this time is asymptomatic. In the ED pt was found to have 89 yo M with history as above presents with 4 day c/o abdominal pain with accompanying UGIB with melanotic stools. Pt at this time is asymptomatic. In the ED pt was found to have of 6.3 89 yo M with history as above presents with 4 day c/o abdominal pain with accompanying UGIB with melanotic stools. Pt at this time is asymptomatic. In the ED pt was found to have Hb of 6.3. Baseline is 10.8 a month ago. Pt is asymptomatic, orthostatic negative. Pt was seen by GI recommend to scope tomorrow.     **UGIB with melanotic stools. Asymptomatic  Pt seen by GI to scope in the am. Keep Pt NPO.  Bowel prep.   Transfuse to keep Hb above 8-9.   Give the pt fluids to keep well hydrated as pt will be NPO also because of volume loss.   Pt can be DISPO'd to RM[facility] as pt is hemodynamically stable and asymptomatic (-ve orthostatics or symptoms of anemia )    Plan discussed with on call Intensivist.

## 2017-11-07 NOTE — H&P ADULT - NSHPSOCIALHISTORY_GEN_ALL_CORE
tobacco: smoked cigars for 25yrs 1box/wk; quit 50yrs ago  EtOH: quit 50yrs ago, had 3-4 drinks/wk  rec drug: none

## 2017-11-07 NOTE — H&P ADULT - PROBLEM SELECTOR PLAN 3
-holding hydroxyurea as cases of unusual bleeding have been found likely 2/2 active UGIB;   -f/u B12/folate/iron studies

## 2017-11-07 NOTE — CONSULT NOTE ADULT - ASSESSMENT
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    # Melena -   - likely 2/2 an UGIB, but could also be due to a R sided LGIB  - ddx - PUD vs AVMs, vs dieulafoy lesions vs malignancy   - 2 large bore IVs  - transfuse for Hgb <7  - IV PPI BID  - will plan for an upper endoscopy  - given his age he is at risk for lower GI malignancy but we will hol doff on prep for a colonoscopy at this time  - patient is in agreement with current plan to proceed with only an upper endoscopy  - NPO for now    Discussed with Dr Mark VIDAL following

## 2017-11-07 NOTE — H&P ADULT - ASSESSMENT
89M PMH CAD w/ 2 stents, 5cm saccular abdominal aortic aneurysm w/o known intervention, essential thrombocytosis, AS, L foot drop, h/o spinal fusion presenting w/ dark stool found to have melena and positive FOBT admitted to [facility] for UGIB

## 2017-11-07 NOTE — H&P ADULT - NSHPPHYSICALEXAM_GEN_ALL_CORE
General: WDWN, NAD, AAOx3  HEENT: MMM, no JVD  Cardio: RRR, S1/S2, no M/R/G appreciated  Pulm: CTA b/l  Abdomen: BS present, soft, NTND, difficulty palpating abdominal aorta  Extremities: WWP, no edema in LE b/l, pulses palpable in b/l DPs  Rectal: deferred given FOBT positive and recent LICO w/ melanotic stool

## 2017-11-07 NOTE — CONSULT NOTE ADULT - ASSESSMENT
89 M with 5.4 cm infrarenal AAA and bilateral common iliac aneurysms, admitted to Medicine for lower GI bleed    - primary care per Medicine  - plan for EVAR on Thursday   - GI to do colonoscopy tomorrow, will need to identify and treat any bleeding prior to EVAR at which time he'll be heparinized  - please obtain echo, cardiac and medical clearance prior to OR  - call [phone] with questions or concerns

## 2017-11-08 DIAGNOSIS — D64.9 ANEMIA, UNSPECIFIED: ICD-10-CM

## 2017-11-08 DIAGNOSIS — I25.10 ATHEROSCLEROTIC HEART DISEASE OF NATIVE CORONARY ARTERY WITHOUT ANGINA PECTORIS: ICD-10-CM

## 2017-11-08 DIAGNOSIS — I71.4 ABDOMINAL AORTIC ANEURYSM, WITHOUT RUPTURE: ICD-10-CM

## 2017-11-08 LAB
ANION GAP SERPL CALC-SCNC: 12 MMOL/L — SIGNIFICANT CHANGE UP (ref 5–17)
BUN SERPL-MCNC: 15 MG/DL — SIGNIFICANT CHANGE UP (ref 7–23)
CALCIUM SERPL-MCNC: 8.4 MG/DL — SIGNIFICANT CHANGE UP (ref 8.4–10.5)
CHLORIDE SERPL-SCNC: 99 MMOL/L — SIGNIFICANT CHANGE UP (ref 96–108)
CO2 SERPL-SCNC: 26 MMOL/L — SIGNIFICANT CHANGE UP (ref 22–31)
CREAT SERPL-MCNC: 1.14 MG/DL — SIGNIFICANT CHANGE UP (ref 0.5–1.3)
GLUCOSE SERPL-MCNC: 103 MG/DL — HIGH (ref 70–99)
HCT VFR BLD CALC: 26.3 % — LOW (ref 39–50)
HGB BLD-MCNC: 8.7 G/DL — LOW (ref 13–17)
MAGNESIUM SERPL-MCNC: 2.1 MG/DL — SIGNIFICANT CHANGE UP (ref 1.6–2.6)
MCHC RBC-ENTMCNC: 33.1 G/DL — SIGNIFICANT CHANGE UP (ref 32–36)
MCHC RBC-ENTMCNC: 34.7 PG — HIGH (ref 27–34)
MCV RBC AUTO: 104.8 FL — HIGH (ref 80–100)
PLATELET # BLD AUTO: 571 K/UL — HIGH (ref 150–400)
POTASSIUM SERPL-MCNC: 4.3 MMOL/L — SIGNIFICANT CHANGE UP (ref 3.5–5.3)
POTASSIUM SERPL-SCNC: 4.3 MMOL/L — SIGNIFICANT CHANGE UP (ref 3.5–5.3)
RBC # BLD: 2.51 M/UL — LOW (ref 4.2–5.8)
SODIUM SERPL-SCNC: 137 MMOL/L — SIGNIFICANT CHANGE UP (ref 135–145)
WBC # BLD: 6.5 K/UL — SIGNIFICANT CHANGE UP (ref 3.8–10.5)
WBC # FLD AUTO: 6.5 K/UL — SIGNIFICANT CHANGE UP (ref 3.8–10.5)

## 2017-11-08 PROCEDURE — 45378 DIAGNOSTIC COLONOSCOPY: CPT

## 2017-11-08 PROCEDURE — 99231 SBSQ HOSP IP/OBS SF/LOW 25: CPT | Mod: 25

## 2017-11-08 PROCEDURE — 93306 TTE W/DOPPLER COMPLETE: CPT | Mod: 26

## 2017-11-08 PROCEDURE — 99233 SBSQ HOSP IP/OBS HIGH 50: CPT | Mod: GC

## 2017-11-08 PROCEDURE — 99222 1ST HOSP IP/OBS MODERATE 55: CPT

## 2017-11-08 RX ORDER — PANTOPRAZOLE SODIUM 20 MG/1
40 TABLET, DELAYED RELEASE ORAL
Qty: 0 | Refills: 0 | Status: DISCONTINUED | OUTPATIENT
Start: 2017-11-08 | End: 2017-11-09

## 2017-11-08 RX ORDER — ASPIRIN/CALCIUM CARB/MAGNESIUM 324 MG
81 TABLET ORAL DAILY
Qty: 0 | Refills: 0 | Status: DISCONTINUED | OUTPATIENT
Start: 2017-11-08 | End: 2017-11-09

## 2017-11-08 RX ORDER — SODIUM CHLORIDE 9 MG/ML
1000 INJECTION INTRAMUSCULAR; INTRAVENOUS; SUBCUTANEOUS
Qty: 0 | Refills: 0 | Status: DISCONTINUED | OUTPATIENT
Start: 2017-11-08 | End: 2017-11-09

## 2017-11-08 RX ORDER — HYDRALAZINE HCL 50 MG
10 TABLET ORAL ONCE
Qty: 0 | Refills: 0 | Status: COMPLETED | OUTPATIENT
Start: 2017-11-08 | End: 2017-11-08

## 2017-11-08 RX ORDER — HYDROXYUREA 500 MG/1
500 CAPSULE ORAL EVERY 12 HOURS
Qty: 0 | Refills: 0 | Status: DISCONTINUED | OUTPATIENT
Start: 2017-11-08 | End: 2017-11-09

## 2017-11-08 RX ORDER — HYDRALAZINE HCL 50 MG
10 TABLET ORAL EVERY 8 HOURS
Qty: 0 | Refills: 0 | Status: DISCONTINUED | OUTPATIENT
Start: 2017-11-08 | End: 2017-11-09

## 2017-11-08 RX ADMIN — Medication 10 MILLIGRAM(S): at 17:32

## 2017-11-08 RX ADMIN — SODIUM CHLORIDE 100 MILLILITER(S): 9 INJECTION INTRAMUSCULAR; INTRAVENOUS; SUBCUTANEOUS at 08:59

## 2017-11-08 RX ADMIN — Medication 81 MILLIGRAM(S): at 17:31

## 2017-11-08 RX ADMIN — SODIUM CHLORIDE 100 MILLILITER(S): 9 INJECTION INTRAMUSCULAR; INTRAVENOUS; SUBCUTANEOUS at 19:25

## 2017-11-08 RX ADMIN — HYDROXYUREA 500 MILLIGRAM(S): 500 CAPSULE ORAL at 18:29

## 2017-11-08 RX ADMIN — PANTOPRAZOLE SODIUM 10 MG/HR: 20 TABLET, DELAYED RELEASE ORAL at 05:05

## 2017-11-08 RX ADMIN — Medication 10 MILLIGRAM(S): at 02:30

## 2017-11-08 RX ADMIN — Medication 10 MILLIGRAM(S): at 11:42

## 2017-11-08 RX ADMIN — PANTOPRAZOLE SODIUM 40 MILLIGRAM(S): 20 TABLET, DELAYED RELEASE ORAL at 17:32

## 2017-11-08 NOTE — PROGRESS NOTE ADULT - PROBLEM SELECTOR PLAN 7
VTE Ppx- no pharmacologic ppx at this time, pt with GI bleed    GI-on Pantoprazole ggt for GI bleed, will transition off today--no bleed/ulcer on EGD

## 2017-11-08 NOTE — PROGRESS NOTE ADULT - ATTENDING COMMENTS
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
Pt. seen and examined by me at 9AM; I have read Dr. Robb's note, I agree w/ her findings and plan of care as documented; appreciate GI, Vascular, and Cardiology recs

## 2017-11-08 NOTE — PROGRESS NOTE ADULT - SUBJECTIVE AND OBJECTIVE BOX
S.  Pt seen bedside. No complaints. Denies any abdominal pain chest pain or SOB.  Prepped for colonoscopy.     aspirin  chewable 81  hydrALAZINE 10      Allergies    No Known Allergies    Intolerances        Vital Signs Last 24 Hrs  T(C): 36.6 (08 Nov 2017 15:27), Max: 37.2 (07 Nov 2017 20:54)  T(F): 97.8 (08 Nov 2017 15:27), Max: 99 (07 Nov 2017 20:54)  HR: 69 (08 Nov 2017 15:27) (62 - 69)  BP: 157/81 (08 Nov 2017 15:27) (157/81 - 183/82)  BP(mean): --  RR: 19 (08 Nov 2017 15:27) (16 - 19)  SpO2: 95% (08 Nov 2017 15:27) (94% - 95%)    Physical Exam:  General:  Pulmonary:  Cardiovascular:  Abdominal:  Extremities:  Pulses:   Right:                                                                           Left:  FEM [ ]2+ [ ]1+ [ ] doppler                                            FEM [ ]2+ [ ]1+ [ ] doppler    POP [ ]2+ [ ]1+ [ ] doppler                                            POP [ ]2+ [ ]1+ [ ] doppler    DP [ ]2+ [ ]1+ [ ] doppler                                               DP [ ]2+ [ ]1+ [ ] doppler  PT[ ]2+ [ ]1+ [ ] doppler                                                 PT [ ]2+ [ ]1+ [ ] doppler      LABS:                        8.7    6.5   )-----------( 571      ( 08 Nov 2017 06:18 )             26.3     11-08    137  |  99  |  15  ----------------------------<  103<H>  4.3   |  26  |  1.14    Ca    8.4      08 Nov 2017 06:18  Mg     2.1     11-08    TPro  6.3  /  Alb  3.4  /  TBili  0.3  /  DBili  x   /  AST  25  /  ALT  17  /  AlkPhos  49  11-06    PT/INR - ( 06 Nov 2017 22:16 )   PT: 11.0 sec;   INR: 0.99          PTT - ( 06 Nov 2017 22:16 )  PTT:24.7 sec      RADIOLOGY & ADDITIONAL TESTS: S.  Pt seen bedside. No complaints. Denies any abdominal pain chest pain or SOB.  Had echo done today. Prepped for colonoscopy.     aspirin  chewable 81  hydrALAZINE 10      Allergies    No Known Allergies    Intolerances        Vital Signs Last 24 Hrs  T(C): 36.6 (08 Nov 2017 15:27), Max: 37.2 (07 Nov 2017 20:54)  T(F): 97.8 (08 Nov 2017 15:27), Max: 99 (07 Nov 2017 20:54)  HR: 69 (08 Nov 2017 15:27) (62 - 69)  BP: 157/81 (08 Nov 2017 15:27) (157/81 - 183/82)  BP(mean): --  RR: 19 (08 Nov 2017 15:27) (16 - 19)  SpO2: 95% (08 Nov 2017 15:27) (94% - 95%)    Physical Exam:  General: NAD, AAO x 3   HEENT: NC/AT, EOMI, normal hearing  Pulmonary: no respiratory distress   Abdominal: soft, NT/ND, no organomegaly  Extremities: WWP, normal strength, no clubbing/cyanosis/edema, 2+ DP pulses  Neuro: A/O x 3, CNs II-XII grossly intact, normal sensation, no focal deficits        LABS:                        8.7    6.5   )-----------( 571      ( 08 Nov 2017 06:18 )             26.3     11-08    137  |  99  |  15  ----------------------------<  103<H>  4.3   |  26  |  1.14    Ca    8.4      08 Nov 2017 06:18  Mg     2.1     11-08    TPro  6.3  /  Alb  3.4  /  TBili  0.3  /  DBili  x   /  AST  25  /  ALT  17  /  AlkPhos  49  11-06    PT/INR - ( 06 Nov 2017 22:16 )   PT: 11.0 sec;   INR: 0.99          PTT - ( 06 Nov 2017 22:16 )  PTT:24.7 sec      RADIOLOGY & ADDITIONAL TESTS:  < from: Echocardiogram (11.08.17 @ 10:44) >    EXAM:  ECHOCARDIOGRAM (CARDIOL)                          PROCEDURE DATE:  11/08/2017                        INTERPRETATION:  Patient Height: 177.0 cm  Patient Weight: 81.0 kg  Heart Rate: 63 bpm  Systolic Pressure: 162 mmHg  Diastolic Pressure: 74 mmHg  BSA: 2.0 m^2  Interpretation Summary  Right atrial size is normal. The left atrium is mildly dilated.There is   mild   aortic valve thickening. Calcified aortic valve. There is trace aortic   regurgitation. There is Moderate aortic stenosis.The calculated aortic   valve   area using the continuity equation is 1.4 cm2. The calculated aortic   valve   area indexed to body surface area is 0.7 cm2/m2. The peak pressure   gradient is   26 mmHg. The mean pressure gradient is 15 mmHg. The dimensionless index   (ratio   of LVOTvelocity to aortic velocity) was calculated to be 0.36.The   calculated   stroke volume index is 47 cc/m2 (normal >35cc/m2).  There is trivial   mitral   valve thickening. There is mild mitral annular calcification. There is   mild   mitral regurgitation.  Structurally normal tricuspid valve. There is   trace   tricuspid regurgitation.There was insufficient TR detected from which to   calculate pulmonary artery systolic pressure.  The pulmonic valve is not   well   visualized. No pulmonic regurgitation noted.The right ventricle is   normal in   size and function.There is mild concentric left ventricular hypertrophy.   Normal LV size. The left ventricular wall motion is normal. The left   ventricular ejection fraction is estimated to be 55-60%  The aortic root   measures 1.9 cm/m2 at sinuses (normal less than 2.1 cm/m2 for men, less   than   2.2 cm/m2 for women).  The ascending aorta measures 1.8 cm/m2 (normal   less   than 1.9 cm/m2 for men, less than 2.2 cm/m2for women).  A small   pericardial   effusion noted. No chamber collapse seen.When compared to prior study   performed on 1/7/2014,  RV systolic function has improved. Aortic   stenosis   severity has increased from mild.    < end of copied text >

## 2017-11-08 NOTE — PROGRESS NOTE ADULT - PROBLEM SELECTOR PLAN 3
Pt with anemia 2/2 to GI bleed.  S/p 2 units pRBC with good response Hgb stable above 8.  Anemia is macrocytic likely 2/2 hydroxyurea, B12 and folate WNL.  -Transfusion goal Hb <7 or symptomatic  -Active T&S  -Continue to monitor

## 2017-11-08 NOTE — PROGRESS NOTE ADULT - PROBLEM SELECTOR PLAN 1
S/p endoscopy yesterday showing gastritis and small hiatal hernia, no active bleed.  Planned for colonoscopy today--completed bowel prep last night.  -F/u colonoscopy findings  -Appreciate additional GI recs  -Will d/c pantoprazole ggt today and transition to Pantoprazole IV as no ulcer/bleed seen on EGD

## 2017-11-08 NOTE — PROGRESS NOTE ADULT - PROBLEM SELECTOR PLAN 5
Pt on ASA only at home for stent placed >10 years ago.  No anginal symptoms at this time.  Cardiology consulted for surgical clearance.  - Will restart ASA as per cards/vascular recs  - Appreciate further cardiology recs

## 2017-11-08 NOTE — PROGRESS NOTE ADULT - PROBLEM SELECTOR PLAN 1
goal plt<450K  discussed with both Dr. Huddleston and Dr. Alvarado who would like to resume pt home medication of hydroxyurea 500 mg BID  will follow along; pls restart his home medication

## 2017-11-08 NOTE — PROGRESS NOTE ADULT - SUBJECTIVE AND OBJECTIVE BOX
OVERNIGHT EVENTS: CURTIS, pt took 4L of Golytely bowel prep.  BPs remained elevated.    SUBJECTIVE / INTERVAL HPI: Patient seen and examined at bedside.  No complaints at present.  Verbalized understanding of plan of care, agreeable to echocardiogram and colonoscopy today in anticipation of possible EVAR tomorrow.  Denies CP, SOB, N/V, abdominal pain, hematochezia, melena, hematuria.    VITAL SIGNS:  Vital Signs Last 24 Hrs  T(C): 37.1 (08 Nov 2017 09:02), Max: 37.2 (07 Nov 2017 20:54)  T(F): 98.8 (08 Nov 2017 09:02), Max: 99 (07 Nov 2017 20:54)  HR: 66 (08 Nov 2017 09:02) (62 - 68)  BP: 169/69 (08 Nov 2017 09:02) (164/80 - 187/73)  BP(mean): --  RR: 16 (08 Nov 2017 09:02) (16 - 17)  SpO2: 95% (08 Nov 2017 09:02) (94% - 96%)    PHYSICAL EXAM:    General: elderly male in NAD, pale  HEENT: NC/AT; PERRL, anicteric sclera; MMM  Cardiovascular: +S1/S2, 2/6 systolic murmur best heard at LSB  Respiratory: CTA b/l no WRR  Gastrointestinal: soft, nontender, bowel sounds present  Extremities: WWP; no edema  Vascular: 2+ radial, DP/PT pulses B/L  Neurological: AAOx3; no focal deficits    MEDICATIONS:  MEDICATIONS  (STANDING):  hydrALAZINE 10 milliGRAM(s) Oral every 8 hours  pantoprazole Infusion 8 mG/Hr (10 mL/Hr) IV Continuous <Continuous>  sodium chloride 0.9%. 1000 milliLiter(s) (100 mL/Hr) IV Continuous <Continuous>    MEDICATIONS  (PRN):    ALLERGIES:  Allergies    No Known Allergies    Intolerances    LABS:                        8.7    6.5   )-----------( 571      ( 08 Nov 2017 06:18 )             26.3     11-08    137  |  99  |  15  ----------------------------<  103<H>  4.3   |  26  |  1.14    Ca    8.4      08 Nov 2017 06:18  Mg     2.1     11-08    TPro  6.3  /  Alb  3.4  /  TBili  0.3  /  DBili  x   /  AST  25  /  ALT  17  /  AlkPhos  49  11-06    PT/INR - ( 06 Nov 2017 22:16 )   PT: 11.0 sec;   INR: 0.99       PTT - ( 06 Nov 2017 22:16 )  PTT:24.7 sec    RADIOLOGY & ADDITIONAL TESTS: Reviewed.

## 2017-11-08 NOTE — PROGRESS NOTE ADULT - PROBLEM SELECTOR PLAN 4
Pt seen by Heme/onc today. Plan per Dr. Huddleston and Dr. Alvarado who would like to resume pt home medication of hydroxyurea 500 mg BID.  -goal plt<450K  -Hydroxyurea 500mg BID  -Continue to monitor

## 2017-11-08 NOTE — CONSULT NOTE ADULT - SUBJECTIVE AND OBJECTIVE BOX
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 Pt has been having 2-3d of dark stool (diarrheal at times) and abdominal ache associated w/ 2ep of vomitus NBNB. He was subsequently seen by his PCP who damien blood work which subsequently resulted in a Hb of 6 at which time he was told to come to the ED. Pt denies lightheadedness/dizziness, CP, chest pressure/tightness, dyspnea, palpitations. Pt denies abdominal pain and nausea at this time. He has these sx in the past and has not had c-scope or EGD that he can remember. Pt states that he takes tylenol at time for pain, no NSAID use. He rarely drinks now, maybe 1 drink 1x/mo. He notes increased stress since the election given subsequent current events.    ED VS: Tmax 99.5F, HR 60s-90s, -161/60s-80s, RR 18, O2 sat 100% on RA  orthostatics checked: lying down 129/66 w/ HR 72; sitting 147/57 w/ HR 63             6.3    7.1   )-----------( 514      ( 06 Nov 2017 22:16 )             18.6   140  |  102  |  26<H>  ----------------------------<  125<H>  4.2   |  26  |  1.12  Ca    8.5      06 Nov 2017 22:16  TPro  6.3  /  Alb  3.4  /  TBili  0.3  /  DBili  x   /  AST  25  /  ALT  17  /  AlkPhos  49  11-06  FOBT positive    seen by ICU consult found to have melena on LICO; given pt not orthostatic, VSS, and pt comfortable AAOx3, pt deemed stable for admission to [facility]  -given ppi bolus and started on protonix gtt. also given 250cc NS bolus and ordered for 2U PRBCs    CT scan from 9/2016: 5cm distal saccular abdominal aortic aneurysm infrarenal that is increased in size since prior study in 2014    [ ] Diabetes   [ ] Hypertension  [ ] Hyperlipidemia  [ ] CAD  [ ] PCI  [ ] CABG    PREVIOUS DIAGNOSTIC TESTING:    [ ] Echocardiogram:  [ ]  Catheterization:  [ ] Stress Test:  	    MEDICATIONS:  hydrALAZINE 10 milliGRAM(s) Oral every 8 hours          pantoprazole Infusion 8 mG/Hr IV Continuous <Continuous>      sodium chloride 0.9%. 1000 milliLiter(s) IV Continuous <Continuous>      FAMILY HISTORY:      SOCIAL HISTORY:    [ ] Non-smoker  [ ] Smoker  [ ] Alcohol    Allergies    No Known Allergies    Intolerances    	    REVIEW OF SYSTEMS:    [x] as per HPI  CONSTITUTIONAL: No fever, weight loss, or fatigue  ENT:  No difficulty hearing, tinnitus, vertigo; No sinus or throat pain  RESPIRATORY: No cough, wheezing, chills or hemoptysis; No Shortness of Breath  CARDIOVASCULAR: No chest pain, palpitations, dizziness, or leg swelling  GASTROINTESTINAL: No abdominal or epigastric pain. No nausea, vomiting, or hematemesis; No diarrhea or constipation. No melena or hematochezia.  GENITOURINARY: No dysuria, frequency, hematuria, or incontinence  NEUROLOGICAL: No headaches, memory loss, loss of strength, numbness, or tremors  MUSCULOSKELETAL: No joint pain or swelling; No muscle, back, or extremity pain  [x] All others negative	  [ ] Unable to obtain    PHYSICAL EXAM:  T(C): 37.1 (11-08-17 @ 09:02), Max: 37.2 (11-07-17 @ 20:54)  HR: 66 (11-08-17 @ 09:02) (62 - 68)  BP: 169/69 (11-08-17 @ 09:02) (164/80 - 187/73)  RR: 16 (11-08-17 @ 09:02) (16 - 17)  SpO2: 95% (11-08-17 @ 09:02) (94% - 96%)  Wt(kg): --  I&O's Summary    07 Nov 2017 07:01  -  08 Nov 2017 07:00  --------------------------------------------------------  IN: 120 mL / OUT: 0 mL / NET: 120 mL        Appearance: Normal	  HEENT:   Normal oral mucosa, PERRL, EOMI	  Lymphatic: No lymphadenopathy  Cardiovascular: Normal S1 S2, No JVD, No murmurs, No edema  Respiratory: Lungs clear to auscultation	  Psychiatry: A & O x 3, Mood & affect appropriate  Gastrointestinal:  Soft, Non-tender, + BS	  Skin: No rashes, No ecchymoses, No cyanosis	  Neurologic: Non-focal  Extremities: Normal range of motion, No clubbing, cyanosis or edema  Vascular: Peripheral pulses palpable 2+ bilaterally    TELEMETRY: 	    ECG:  < from: 12 Lead ECG (11.06.17 @ 22:08) >  Diagnosis Line Normal sinus rhythm  Left axis deviation  Nonspecific ST and T wave abnormality    < end of copied text >    ECHO:  STRESS:  CATH:  	  RADIOLOGY:  CXR:  CT:  US:   	  	  LABS:	 	    CARDIAC MARKERS:                                  8.7    6.5   )-----------( 571      ( 08 Nov 2017 06:18 )             26.3     11-08    137  |  99  |  15  ----------------------------<  103<H>  4.3   |  26  |  1.14    Ca    8.4      08 Nov 2017 06:18  Mg     2.1     11-08    TPro  6.3  /  Alb  3.4  /  TBili  0.3  /  DBili  x   /  AST  25  /  ALT  17  /  AlkPhos  49  11-06    proBNP:   Lipid Profile:   HgA1c:   TSH:     ASSESSMENT/PLAN: 	    # Preop CV assessment  David Score 0.96% MACE  Not on BB, given inherent HR 60s would not start BB  ASA 81mg should be started  2D echo ordered to eval LV EF  no chest pain EKG no acute findings  able to lay flat not in failure  if echo normal, cardiac optimized for surgery    #CAD - PCI >10 years ago, no chest pain able to lay flat HR at goal    #HTN - not at goal, started on hydralazine, agree good for afterload reduction titrate t ogoal SBP<140mmHg    #CV Prevention  q3mo Fasting Lipid Profile, Goal LDL<100, statin as tolerated  q6week TSH  q3mo 25-OH Vitamin D Level, Goal 50, supplement as tolerated

## 2017-11-08 NOTE — PROGRESS NOTE ADULT - SUBJECTIVE AND OBJECTIVE BOX
Heme/Onc Progress Note (Dr. Huddleston - covering Dr. Alvarado)  Discussed with Dr. Huddleston and plan reviewed with the primary team.    The patient was seen and examined.      The patient is a 89y Male with history of Essential thrombocytosis followed by Dr. Alvarado as an outpt.  Pt has been continued on Hydroxyurea 500 mg BID  and treated for ET for >30 yrs.  Pt has never had issues with medication and occasionally requires an additional 500 mg added to regimen to maintain plt count <450K.  Pt currently is admitted for GI bleed and is s/p EGD that raised concern for AAA.  He had ECHO performed today and vascular surery will follow up for treatment plan.  Pt is planned for colonoscopy today.        Pt currently has no sig complaints.  No fevers/chills/night sweats.  No n/v.  No abd pain.  He is unsure if having further GI bleeding at this time.     Allergies    No Known Allergies    Intolerances    Medications:  MEDICATIONS  (STANDING):  hydrALAZINE 10 milliGRAM(s) Oral every 8 hours  pantoprazole Infusion 8 mG/Hr (10 mL/Hr) IV Continuous <Continuous>  sodium chloride 0.9%. 1000 milliLiter(s) (100 mL/Hr) IV Continuous <Continuous>    MEDICATIONS  (PRN):    PHYSICAL EXAM:    T(F): 98.8 (11-08-17 @ 09:02), Max: 99 (11-07-17 @ 20:54)  HR: 66 (11-08-17 @ 09:02) (62 - 68)  BP: 169/69 (11-08-17 @ 09:02) (164/80 - 187/73)  RR: 16 (11-08-17 @ 09:02) (16 - 17)  SpO2: 95% (11-08-17 @ 09:02) (94% - 96%)  Wt(kg): --    Daily Height in cm: 177.8 (07 Nov 2017 14:14)    Daily     Gen: well developed, well nourished, comfortable  HEENT: normocephalic/atraumatic, no conjunctival pallor, no scleral icterus, no oral thrush/mucosal bleeding/mucositis  Neck: supple, no masses, no JVD  Cardiovascular: RR, nl S1S2, no murmurs/rubs/gallops  Respiratory: clear air entry b/l anteriorly  Gastrointestinal: BS+, soft, NT/ND, no masses, no splenomegaly, no hepatomegaly  Extremities: no clubbing/cyanosis, no edema, no calf tenderness  Vascular:  DP/PT 2+ b/l  Neurological:  no focal deficits  Skin: no rash on visible skin  Musculoskeletal:  full ROM  Psychiatric:  mood stable    Labs:                          8.7    6.5   )-----------( 571      ( 08 Nov 2017 06:18 )             26.3     CBC Full  -  ( 08 Nov 2017 06:18 )  WBC Count : 6.5 K/uL  Hemoglobin : 8.7 g/dL  Hematocrit : 26.3 %  Platelet Count - Automated : 571 K/uL  Mean Cell Volume : 104.8 fL  Mean Cell Hemoglobin : 34.7 pg  Mean Cell Hemoglobin Concentration : 33.1 g/dL  Auto Neutrophil # : x  Auto Lymphocyte # : x  Auto Monocyte # : x  Auto Eosinophil # : x  Auto Basophil # : x  Auto Neutrophil % : x  Auto Lymphocyte % : x  Auto Monocyte % : x  Auto Eosinophil % : x  Auto Basophil % : x    PT/INR - ( 06 Nov 2017 22:16 )   PT: 11.0 sec;   INR: 0.99          PTT - ( 06 Nov 2017 22:16 )  PTT:24.7 sec    11-08    137  |  99  |  15  ----------------------------<  103<H>  4.3   |  26  |  1.14    Ca    8.4      08 Nov 2017 06:18  Mg     2.1     11-08    TPro  6.3  /  Alb  3.4  /  TBili  0.3  /  DBili  x   /  AST  25  /  ALT  17  /  AlkPhos  49  11-06          Other Labs:    Cultures:    Pathology:    Imaging Studies:

## 2017-11-08 NOTE — PROGRESS NOTE ADULT - PROBLEM SELECTOR PLAN 2
Pt agreeable to surgical repair.  Planned for EVAR tomorrow, pending colonoscopy and cardiac clearance.  Dr Hsu, Cardiology, consulted today.  - F/u echocardiogram  - Coags, T&S for AM

## 2017-11-09 ENCOUNTER — TRANSCRIPTION ENCOUNTER (OUTPATIENT)
Age: 82
End: 2017-11-09

## 2017-11-09 VITALS
RESPIRATION RATE: 18 BRPM | TEMPERATURE: 98 F | SYSTOLIC BLOOD PRESSURE: 171 MMHG | DIASTOLIC BLOOD PRESSURE: 87 MMHG | HEART RATE: 71 BPM | OXYGEN SATURATION: 94 %

## 2017-11-09 DIAGNOSIS — I71.4 ABDOMINAL AORTIC ANEURYSM, W/OUT RUPTURE: ICD-10-CM

## 2017-11-09 LAB
ANION GAP SERPL CALC-SCNC: 14 MMOL/L — SIGNIFICANT CHANGE UP (ref 5–17)
APTT BLD: 25.8 SEC — LOW (ref 27.5–37.4)
BUN SERPL-MCNC: 16 MG/DL — SIGNIFICANT CHANGE UP (ref 7–23)
CALCIUM SERPL-MCNC: 8.1 MG/DL — LOW (ref 8.4–10.5)
CHLORIDE SERPL-SCNC: 100 MMOL/L — SIGNIFICANT CHANGE UP (ref 96–108)
CO2 SERPL-SCNC: 24 MMOL/L — SIGNIFICANT CHANGE UP (ref 22–31)
CREAT SERPL-MCNC: 1.12 MG/DL — SIGNIFICANT CHANGE UP (ref 0.5–1.3)
GLUCOSE SERPL-MCNC: 100 MG/DL — HIGH (ref 70–99)
HCT VFR BLD CALC: 25.8 % — LOW (ref 39–50)
HGB BLD-MCNC: 8.7 G/DL — LOW (ref 13–17)
INR BLD: 1.04 — SIGNIFICANT CHANGE UP (ref 0.88–1.16)
MCHC RBC-ENTMCNC: 33.7 G/DL — SIGNIFICANT CHANGE UP (ref 32–36)
MCHC RBC-ENTMCNC: 35.2 PG — HIGH (ref 27–34)
MCV RBC AUTO: 104.5 FL — HIGH (ref 80–100)
PLATELET # BLD AUTO: 520 K/UL — HIGH (ref 150–400)
POTASSIUM SERPL-MCNC: 3.7 MMOL/L — SIGNIFICANT CHANGE UP (ref 3.5–5.3)
POTASSIUM SERPL-SCNC: 3.7 MMOL/L — SIGNIFICANT CHANGE UP (ref 3.5–5.3)
PROTHROM AB SERPL-ACNC: 11.6 SEC — SIGNIFICANT CHANGE UP (ref 9.8–12.7)
RBC # BLD: 2.47 M/UL — LOW (ref 4.2–5.8)
RBC # FLD: 24.5 % — HIGH (ref 10.3–16.9)
SODIUM SERPL-SCNC: 138 MMOL/L — SIGNIFICANT CHANGE UP (ref 135–145)
WBC # BLD: 7.7 K/UL — SIGNIFICANT CHANGE UP (ref 3.8–10.5)
WBC # FLD AUTO: 7.7 K/UL — SIGNIFICANT CHANGE UP (ref 3.8–10.5)

## 2017-11-09 PROCEDURE — 99239 HOSP IP/OBS DSCHRG MGMT >30: CPT

## 2017-11-09 PROCEDURE — 93010 ELECTROCARDIOGRAM REPORT: CPT

## 2017-11-09 RX ORDER — POTASSIUM CHLORIDE 20 MEQ
10 PACKET (EA) ORAL
Qty: 0 | Refills: 0 | Status: DISCONTINUED | OUTPATIENT
Start: 2017-11-09 | End: 2017-11-09

## 2017-11-09 RX ORDER — POTASSIUM CHLORIDE 20 MEQ
20 PACKET (EA) ORAL
Qty: 0 | Refills: 0 | Status: COMPLETED | OUTPATIENT
Start: 2017-11-09 | End: 2017-11-09

## 2017-11-09 RX ORDER — HYDROXYUREA 500 MG/1
1000 CAPSULE ORAL
Qty: 0 | Refills: 0 | COMMUNITY

## 2017-11-09 RX ORDER — ASPIRIN/CALCIUM CARB/MAGNESIUM 324 MG
1 TABLET ORAL
Qty: 0 | Refills: 0 | COMMUNITY
Start: 2017-11-09

## 2017-11-09 RX ORDER — PANTOPRAZOLE SODIUM 20 MG/1
1 TABLET, DELAYED RELEASE ORAL
Qty: 30 | Refills: 0 | OUTPATIENT
Start: 2017-11-09 | End: 2017-12-09

## 2017-11-09 RX ORDER — HYDROXYUREA 500 MG/1
1 CAPSULE ORAL
Qty: 0 | Refills: 0 | COMMUNITY
Start: 2017-11-09

## 2017-11-09 RX ORDER — HYDRALAZINE HCL 50 MG
1 TABLET ORAL
Qty: 90 | Refills: 0 | OUTPATIENT
Start: 2017-11-09 | End: 2017-12-09

## 2017-11-09 RX ADMIN — Medication 10 MILLIGRAM(S): at 09:54

## 2017-11-09 RX ADMIN — HYDROXYUREA 500 MILLIGRAM(S): 500 CAPSULE ORAL at 09:55

## 2017-11-09 RX ADMIN — Medication 10 MILLIGRAM(S): at 01:01

## 2017-11-09 RX ADMIN — Medication 20 MILLIEQUIVALENT(S): at 15:16

## 2017-11-09 RX ADMIN — Medication 20 MILLIEQUIVALENT(S): at 12:59

## 2017-11-09 RX ADMIN — Medication 81 MILLIGRAM(S): at 12:59

## 2017-11-09 RX ADMIN — SODIUM CHLORIDE 100 MILLILITER(S): 9 INJECTION INTRAMUSCULAR; INTRAVENOUS; SUBCUTANEOUS at 01:02

## 2017-11-09 NOTE — DISCHARGE NOTE ADULT - HOSPITAL COURSE
89 yo male PMH of CAD s/p stent (>10 years ago), AS, AAA, and essential thrombocytosis, presented to [facility] with melena and anemia (hgb 6).  He received 2 units pRBC and Hgb stabilized above 8.  Endoscopy showed gastritis and hiatal hernia, no source of bleeding was noted.  On CT, pt was noted to have 2 mm progression of AAA (5.4cm) and was evaluated by Vascular Surgery, who offered EVAR on outpatient basis.  Colonoscopy was performed and was unremarkable, GI cleared pt for vascular procedure.  Pt was seen by Dr Hsu, Cardiology, for cardiac clearance.  An echo was performed that showed progression of AS from mild to moderate, small pericardial effusion, EF 55-60%.  While in the hospital pt was hypertensive to 180s, asymptomatic.  He was started on Hydralazine 10mg q8.  On the day of discharge, the patient was seen and examined. Symptoms improved. Vital signs are stable. Labs and imaging reviewed. Patient is medically optimized and hemodynamically stable. Return precautions discussed, medication teach back done, and importance of physician followup emphasized. The patient verbalized understanding. 89 yo male PMH of CAD s/p stent (>10 years ago), AS, AAA, and essential thrombocytosis, presented to [facility] with melena and anemia (hgb 6).  He received 2 units pRBC and Hgb stabilized above 8.  upper Endoscopy showed gastritis and hiatal hernia, no source of bleeding was noted.  On CT, pt was noted to have 2 mm progression of AAA (5.4cm) and was evaluated by Vascular Surgery, who offered EVAR on outpatient basis.  Colonoscopy was performed and was unremarkable, GI cleared pt for vascular procedure.  Pt was seen by Dr Hsu, Cardiology, for cardiac clearance.  An echo was performed that showed progression of AS from mild to moderate, small pericardial effusion, EF 55-60%.  While in the hospital pt was hypertensive to 180s, asymptomatic.  He was started on Hydralazine 10mg q8.  On the day of discharge, the patient was seen and examined. Symptoms improved. Vital signs are stable. Labs and imaging reviewed. Patient is medically optimized and hemodynamically stable. Return precautions discussed, medication teach back done, and importance of physician followup emphasized. The patient verbalized understanding.

## 2017-11-09 NOTE — DISCHARGE NOTE ADULT - CARE PROVIDERS DIRECT ADDRESSES
[email or web address]

## 2017-11-09 NOTE — PROGRESS NOTE ADULT - SUBJECTIVE AND OBJECTIVE BOX
Heme/Onc Progress Note (Dr. Huddleston)  Discussed with Dr. Huddleston and plan reviewed with the primary team.    The patient was seen and examined.      The patient is a 89y Male 89y Male with history of Essential thrombocytosis followed by Dr. Alvarado as an outpt.  Pt has been continued on Hydroxyurea 500 mg BID  and treated for ET for >30 yrs.  Pt has never had issues with medication and occasionally requires an additional 500 mg added to regimen to maintain plt count <450K.  Pt currently is admitted for GI bleed and is s/p EGD that raised concern for AAA.  He had ECHO performed today and vascular surery will follow up for treatment plan.  Pt is planned for colonoscopy today.      Interval History:    Pt had colonoscopy that did not reveal disease.  Pt is planned for EVAR next week as elective procedure.  Pt has no complaints at this time.  NO melena/hematochezia.  No fevers/chills/night sweats.  No n/v.  No abd pain.     ROS is otherwise negative.      Allergies    No Known Allergies    Intolerances        Medications:  MEDICATIONS  (STANDING):  aspirin  chewable 81 milliGRAM(s) Oral daily  hydrALAZINE 10 milliGRAM(s) Oral every 8 hours  hydroxyurea 500 milliGRAM(s) Oral every 12 hours  pantoprazole    Tablet 40 milliGRAM(s) Oral before breakfast  potassium chloride    Tablet ER 20 milliEquivalent(s) Oral every 2 hours    MEDICATIONS  (PRN):    aspirin  chewable 81 milliGRAM(s) Oral daily    hydroxyurea 500 milliGRAM(s) Oral every 12 hours    PHYSICAL EXAM:    T(F): 98.4 (11-09-17 @ 08:45), Max: 99.2 (11-09-17 @ 05:12)  HR: 71 (11-09-17 @ 08:45) (69 - 71)  BP: 171/87 (11-09-17 @ 08:45) (134/55 - 171/87)  RR: 18 (11-09-17 @ 08:45) (17 - 19)  SpO2: 94% (11-09-17 @ 08:45) (94% - 95%)    Gen: well developed, well nourished, comfortable  HEENT: normocephalic/atraumatic, no conjunctival pallor, no scleral icterus, no oral thrush/mucosal bleeding/mucositis  Neck: supple, no masses, no JVD  Cardiovascular: RR, nl S1S2, no murmurs/rubs/gallops  Respiratory: clear air entry b/l anteriorly  Gastrointestinal: BS+, soft, NT/ND, no masses, no splenomegaly, no hepatomegaly  Extremities: no clubbing/cyanosis, no edema, no calf tenderness  Vascular:  DP/PT 2+ b/l  Neurological:  no focal deficits  Skin: no rash on visible skin  Musculoskeletal:  full ROM  Psychiatric:  mood stable    Labs:                          8.7    7.7   )-----------( 520      ( 09 Nov 2017 08:39 )             25.8     CBC Full  -  ( 09 Nov 2017 08:39 )  WBC Count : 7.7 K/uL  Hemoglobin : 8.7 g/dL  Hematocrit : 25.8 %  Platelet Count - Automated : 520 K/uL  Mean Cell Volume : 104.5 fL  Mean Cell Hemoglobin : 35.2 pg  Mean Cell Hemoglobin Concentration : 33.7 g/dL  Auto Neutrophil # : x  Auto Lymphocyte # : x  Auto Monocyte # : x  Auto Eosinophil # : x  Auto Basophil # : x  Auto Neutrophil % : x  Auto Lymphocyte % : x  Auto Monocyte % : x  Auto Eosinophil % : x  Auto Basophil % : x    PT/INR - ( 09 Nov 2017 08:39 )   PT: 11.6 sec;   INR: 1.04          PTT - ( 09 Nov 2017 08:39 )  PTT:25.8 sec    11-09    138  |  100  |  16  ----------------------------<  100<H>  3.7   |  24  |  1.12    Ca    8.1<L>      09 Nov 2017 08:39  Mg     2.1     11-08            Other Labs:    Cultures:    Pathology:    Imaging Studies:

## 2017-11-09 NOTE — DISCHARGE NOTE ADULT - PLAN OF CARE
Prevent bleeding You came to the hospital because of dark stool and anemia caused by a GI bleed.  You received a blood transfusion and your blood count has stabilized.  An endoscopy was performed that showed inflammation of the stomach but no active bleeding.  A colonoscopy was performed that also showed no bleeding.  To reduce your bleeding risk you should avoid use of NSAIDs and continue with the acid reducing medication, Pantoprazole, that you were started on in the hospital.  A prescription was sent to your pharmacy.  Please follow up with your primary doctor in 1-2 weeks of discharge for monitoring of your low blood count, and guidance on how long you should continue the acid reducing medicine.  If you have black stools or bright red blood per rectum please return to the hospital for evaluation. You were previously diagnosed with an abdominal aortic aneurysm.  This is an abnormal widening of the main blood vessel in your belly.  In the hospital you were seen by Dr White, Vascular Surgery, who has scheduled you for outpatient surgical repair called EVAR on Thursday November 16th. You were previously diagnosed with CAD.  Please continue taking Aspirin, and follow-up with your primary doctor for further surveillance. You were previously diagnosed with aortic stenosis, or tightening of the aortic valve.  An echocardiogram was performed while you were in the hospital which showed that your aortic valve tightening has progressed, but no additional work-up or intervention is indicated at this time. You were previously diagnosed with high platelet levels.  In the hospital you were seen by hematology and your home medications were continued.  Please continue with your hydroxyurea and follow-up with your hematologist for further surveillance and management. In the hospital you had high blood pressures, and you were started on a new medication, Hydralazine.  Please continue with this medication until you see your Primary Care doctor to determine if you will continue to need this medication outside of the hospital.  A prescription was sent to your pharmacy.

## 2017-11-09 NOTE — DISCHARGE NOTE ADULT - CARE PLAN
Principal Discharge DX:	Upper GI bleeding  Goal:	Prevent bleeding  Instructions for follow-up, activity and diet:	You came to the hospital because of dark stool and anemia caused by a GI bleed.  You received a blood transfusion and your blood count has stabilized.  An endoscopy was performed that showed inflammation of the stomach but no active bleeding.  A colonoscopy was performed that also showed no bleeding.  To reduce your bleeding risk you should avoid use of NSAIDs and continue with the acid reducing medication, Pantoprazole, that you were started on in the hospital.  A prescription was sent to your pharmacy.  Please follow up with your primary doctor in 1-2 weeks of discharge for monitoring of your low blood count, and guidance on how long you should continue the acid reducing medicine.  If you have black stools or bright red blood per rectum please return to the hospital for evaluation.  Secondary Diagnosis:	Abdominal aortic aneurysm (AAA) without rupture  Instructions for follow-up, activity and diet:	You were previously diagnosed with an abdominal aortic aneurysm.  This is an abnormal widening of the main blood vessel in your belly.  In the hospital you were seen by Dr White, Vascular Surgery, who has scheduled you for outpatient surgical repair called EVAR on Thursday November 16th.  Secondary Diagnosis:	Coronary artery disease, angina presence unspecified, unspecified vessel or lesion type, unspecified whether native or transplanted heart  Instructions for follow-up, activity and diet:	You were previously diagnosed with CAD.  Please continue taking Aspirin, and follow-up with your primary doctor for further surveillance.  Secondary Diagnosis:	Aortic valve stenosis, etiology of cardiac valve disease unspecified  Instructions for follow-up, activity and diet:	You were previously diagnosed with aortic stenosis, or tightening of the aortic valve.  An echocardiogram was performed while you were in the hospital which showed that your aortic valve tightening has progressed, but no additional work-up or intervention is indicated at this time.  Secondary Diagnosis:	Essential thrombocytosis  Instructions for follow-up, activity and diet:	You were previously diagnosed with high platelet levels.  In the hospital you were seen by hematology and your home medications were continued.  Please continue with your hydroxyurea and follow-up with your hematologist for further surveillance and management. Principal Discharge DX:	Upper GI bleeding  Goal:	Prevent bleeding  Instructions for follow-up, activity and diet:	You came to the hospital because of dark stool and anemia caused by a GI bleed.  You received a blood transfusion and your blood count has stabilized.  An endoscopy was performed that showed inflammation of the stomach but no active bleeding.  A colonoscopy was performed that also showed no bleeding.  To reduce your bleeding risk you should avoid use of NSAIDs and continue with the acid reducing medication, Pantoprazole, that you were started on in the hospital.  A prescription was sent to your pharmacy.  Please follow up with your primary doctor in 1-2 weeks of discharge for monitoring of your low blood count, and guidance on how long you should continue the acid reducing medicine.  If you have black stools or bright red blood per rectum please return to the hospital for evaluation.  Secondary Diagnosis:	Abdominal aortic aneurysm (AAA) without rupture  Instructions for follow-up, activity and diet:	You were previously diagnosed with an abdominal aortic aneurysm.  This is an abnormal widening of the main blood vessel in your belly.  In the hospital you were seen by Dr White, Vascular Surgery, who has scheduled you for outpatient surgical repair called EVAR on Thursday November 16th.  Secondary Diagnosis:	Coronary artery disease, angina presence unspecified, unspecified vessel or lesion type, unspecified whether native or transplanted heart  Instructions for follow-up, activity and diet:	You were previously diagnosed with CAD.  Please continue taking Aspirin, and follow-up with your primary doctor for further surveillance.  Secondary Diagnosis:	Aortic valve stenosis, etiology of cardiac valve disease unspecified  Instructions for follow-up, activity and diet:	You were previously diagnosed with aortic stenosis, or tightening of the aortic valve.  An echocardiogram was performed while you were in the hospital which showed that your aortic valve tightening has progressed, but no additional work-up or intervention is indicated at this time.  Secondary Diagnosis:	Essential thrombocytosis  Instructions for follow-up, activity and diet:	You were previously diagnosed with high platelet levels.  In the hospital you were seen by hematology and your home medications were continued.  Please continue with your hydroxyurea and follow-up with your hematologist for further surveillance and management.  Secondary Diagnosis:	Hypertension, unspecified type  Instructions for follow-up, activity and diet:	In the hospital you had high blood pressures, and you were started on a new medication, Hydralazine.  Please continue with this medication until you see your Primary Care doctor to determine if you will continue to need this medication outside of the hospital.  A prescription was sent to your pharmacy.

## 2017-11-09 NOTE — PROGRESS NOTE ADULT - ASSESSMENT
89 yo male PMH of CAD s/p stent (>10 years ago) and essential thrombocytosis, presented to [facility] with melena and anemia being worked up for GI bleed and AAA.
89 yo male with hx of ET who is admitted for w/u of GI bleed with no active bleeding source found also noted to have AAA which will also be evaluated and managed as an outpt.
89yr old M with PMHx significant for CAD sp 2 stents (~10yrs ago, not on ACE/BB/statin/ASA because he was told to d/c by doctors), saccular abdominal aortic aneurysm (5cm found on CT 1yr ago at [facility], known to pt and w/o intervention), essential thrombocytosis (on hydroxyurea), AS, L foot drop, h/o spinal fusion who was told by PCP NP to come to ED for Hgb of 6 on outpatient labs drawn recently.    # Melena -   - likely 2/2 an UGIB, but could also be due to a R sided LGIB  - ddx - PUD vs AVMs, vs dieulafoy lesions vs malignancy vs diverticulosis  - 2 large bore IVs  - transfuse for Hgb <7  - sp transfusion with good response and no further Hgb drop  - sp EGD (11/7/17) - with no evidence of upper GI issues up to 2nd portion of the duodenum  - sp colonoscopy (11/8/17) - done as vascular wanted to repair his aneurysm and needed to make sure there was no modifiable risk factors prior to starting AC - which only showed diverticulosis and no mass/AVMs  - may resume feeding    Discussed with attending Dr Flores  GI will sign off for now, please reconsult us as needed
89 yo male with hx of ET who is admitted for w/u of GI bleed also noted to have AAA which will also be evaluated and managed on this admission.
89 M with 5.4 cm infrarenal AAA and bilateral common iliac aneurysms, admitted to Medicine for lower GI bleed    - EVAR to be  scheduled as outpatient with Dr. White.   - primary care per Medicine  - GI to do colonoscopy today, will need to identify and treat any bleeding prior to EVAR at which time he'll be heparinized  - f/u cardiac clearance   - call [phone] with questions or concerns   - Discussed with chief resident

## 2017-11-09 NOTE — DISCHARGE NOTE ADULT - MEDICATION SUMMARY - MEDICATIONS TO TAKE
I will START or STAY ON the medications listed below when I get home from the hospital:    aspirin 81 mg oral tablet, chewable  -- 1 tab(s) by mouth once a day  -- Indication: For CAD (coronary artery disease)    Lexapro 10 mg oral tablet  -- 1 tab(s) by mouth once a day  -- Indication: For Depression    hydroxyurea 500 mg oral capsule  -- 1 cap(s) by mouth every 12 hours  -- Indication: For Essential thrombocytosis    temazepam 15 mg oral capsule  -- 2 cap(s) by mouth once a day (at bedtime)  -- Indication: For Depression    dicyclomine 10 mg oral capsule  -- orally 2 times a day  -- Indication: For IBS (irritable bowel syndrome)    pantoprazole 40 mg oral delayed release tablet  -- 1 tab(s) by mouth once a day (before a meal)  -- Indication: For Gastritis    hydrALAZINE 10 mg oral tablet  -- 1 tab(s) by mouth every 8 hours  -- Indication: For Hypertension

## 2017-11-09 NOTE — DISCHARGE NOTE ADULT - CARE PROVIDER_API CALL
Marcelo Alvarado), Hematology; Internal Medicine; Medical Oncology  [address]  [address]  [place] [address]  Phone: [phone]  Fax: [phone]    Haile White), Surgery; Vascular Surgery  [address]  [address] [place] [address]  Phone: [phone]  Fax: [phone]    Jaime Mckoy  Phone: (   )    -  Fax: (   )    -

## 2017-11-09 NOTE — DISCHARGE NOTE ADULT - PROVIDER TOKENS
TOKEN:'[id number]',TOKEN:'[id number]',FREE:[LAST:[Ean],FIRST:[Jaime],PHONE:[(   )    -],FAX:[(   )    -]]

## 2017-11-09 NOTE — PROGRESS NOTE ADULT - PROBLEM SELECTOR PLAN 1
goal plt<450K  discussed with both Dr. Huddleston and Dr. Alvarado who recc continuation of hydroxyurea 500 mg BID

## 2017-11-09 NOTE — DISCHARGE NOTE ADULT - PATIENT PORTAL LINK FT
“You can access the Follow[employer] Patient Portal, offered by [facility], by registering with the following website: [email or web address]”

## 2017-11-09 NOTE — PROGRESS NOTE ADULT - SUBJECTIVE AND OBJECTIVE BOX
Pt seen and examined at bedside  NO new c/o  Denies n/v/d, abdominal pain, bleeding, melena    MEDICATIONS:  MEDICATIONS  (STANDING):  aspirin  chewable 81 milliGRAM(s) Oral daily  hydrALAZINE 10 milliGRAM(s) Oral every 8 hours  hydroxyurea 500 milliGRAM(s) Oral every 12 hours  pantoprazole    Tablet 40 milliGRAM(s) Oral before breakfast  potassium chloride    Tablet ER 20 milliEquivalent(s) Oral every 2 hours    MEDICATIONS  (PRN):    Allergies  No Known Allergies    Intolerances    Vital Signs Last 24 Hrs  T(C): 36.9 (09 Nov 2017 08:45), Max: 37.3 (09 Nov 2017 05:12)  T(F): 98.4 (09 Nov 2017 08:45), Max: 99.2 (09 Nov 2017 05:12)  HR: 71 (09 Nov 2017 08:45) (69 - 71)  BP: 171/87 (09 Nov 2017 08:45) (134/55 - 171/87)  BP(mean): --  RR: 18 (09 Nov 2017 08:45) (17 - 19)  SpO2: 94% (09 Nov 2017 08:45) (94% - 95%)    11-08 @ 07:01  -  11-09 @ 07:00  --------------------------------------------------------  IN: 2470 mL / OUT: 0 mL / NET: 2470 mL      PHYSICAL EXAM:  GEN - elderly M lying in bed in no distress, anicteric, mild conjunctival pallor  Gastrointestinal: full, soft, BS+, NT, NR, NG, no masses or organs palpated  Neurological: AAOx3 non focal    LABS:  CBC Full  -  ( 09 Nov 2017 08:39 )  WBC Count : 7.7 K/uL  Hemoglobin : 8.7 g/dL  Hematocrit : 25.8 %  Platelet Count - Automated : 520 K/uL  Mean Cell Volume : 104.5 fL  Mean Cell Hemoglobin : 35.2 pg  Mean Cell Hemoglobin Concentration : 33.7 g/dL    11-09    138  |  100  |  16  ----------------------------<  100<H>  3.7   |  24  |  1.12    Ca    8.1<L>      09 Nov 2017 08:39  Mg     2.1     11-08    PT/INR - ( 09 Nov 2017 08:39 )   PT: 11.6 sec;   INR: 1.04       PTT - ( 09 Nov 2017 08:39 )  PTT:25.8 sec      RADIOLOGY & ADDITIONAL STUDIES (The following images were personally reviewed):

## 2017-11-09 NOTE — DISCHARGE NOTE ADULT - VISION (WITH CORRECTIVE LENSES IF THE PATIENT USUALLY WEARS THEM):
legally blind/Partially impaired: cannot see medication labels or newsprint, but can see obstacles in path, and the surrounding layout; can count fingers at arm's length

## 2017-11-09 NOTE — DISCHARGE NOTE ADULT - SECONDARY DIAGNOSIS.
Abdominal aortic aneurysm (AAA) without rupture Coronary artery disease, angina presence unspecified, unspecified vessel or lesion type, unspecified whether native or transplanted heart Aortic valve stenosis, etiology of cardiac valve disease unspecified Essential thrombocytosis Hypertension, unspecified type

## 2017-11-13 DIAGNOSIS — K64.1 SECOND DEGREE HEMORRHOIDS: ICD-10-CM

## 2017-11-13 DIAGNOSIS — R19.5 OTHER FECAL ABNORMALITIES: ICD-10-CM

## 2017-11-13 DIAGNOSIS — I71.4 ABDOMINAL AORTIC ANEURYSM, WITHOUT RUPTURE: ICD-10-CM

## 2017-11-13 DIAGNOSIS — Z95.5 PRESENCE OF CORONARY ANGIOPLASTY IMPLANT AND GRAFT: ICD-10-CM

## 2017-11-13 DIAGNOSIS — D47.3 ESSENTIAL (HEMORRHAGIC) THROMBOCYTHEMIA: ICD-10-CM

## 2017-11-13 DIAGNOSIS — I31.3 PERICARDIAL EFFUSION (NONINFLAMMATORY): ICD-10-CM

## 2017-11-13 DIAGNOSIS — K58.9 IRRITABLE BOWEL SYNDROME WITHOUT DIARRHEA: ICD-10-CM

## 2017-11-13 DIAGNOSIS — K92.2 GASTROINTESTINAL HEMORRHAGE, UNSPECIFIED: ICD-10-CM

## 2017-11-13 DIAGNOSIS — F32.9 MAJOR DEPRESSIVE DISORDER, SINGLE EPISODE, UNSPECIFIED: ICD-10-CM

## 2017-11-13 DIAGNOSIS — K29.70 GASTRITIS, UNSPECIFIED, WITHOUT BLEEDING: ICD-10-CM

## 2017-11-13 DIAGNOSIS — Z87.891 PERSONAL HISTORY OF NICOTINE DEPENDENCE: ICD-10-CM

## 2017-11-13 DIAGNOSIS — D62 ACUTE POSTHEMORRHAGIC ANEMIA: ICD-10-CM

## 2017-11-13 DIAGNOSIS — K44.9 DIAPHRAGMATIC HERNIA WITHOUT OBSTRUCTION OR GANGRENE: ICD-10-CM

## 2017-11-13 DIAGNOSIS — M21.962 UNSPECIFIED ACQUIRED DEFORMITY OF LEFT LOWER LEG: ICD-10-CM

## 2017-11-13 DIAGNOSIS — Z98.1 ARTHRODESIS STATUS: ICD-10-CM

## 2017-11-13 DIAGNOSIS — I25.10 ATHEROSCLEROTIC HEART DISEASE OF NATIVE CORONARY ARTERY WITHOUT ANGINA PECTORIS: ICD-10-CM

## 2017-11-13 DIAGNOSIS — I35.0 NONRHEUMATIC AORTIC (VALVE) STENOSIS: ICD-10-CM

## 2017-11-13 DIAGNOSIS — K57.30 DIVERTICULOSIS OF LARGE INTESTINE WITHOUT PERFORATION OR ABSCESS WITHOUT BLEEDING: ICD-10-CM

## 2017-11-14 ENCOUNTER — APPOINTMENT (OUTPATIENT)
Dept: PSYCHIATRY | Facility: CLINIC | Age: 82
End: 2017-11-14

## 2017-11-15 VITALS
TEMPERATURE: 99 F | WEIGHT: 184.97 LBS | DIASTOLIC BLOOD PRESSURE: 77 MMHG | HEART RATE: 78 BPM | RESPIRATION RATE: 24 BRPM | SYSTOLIC BLOOD PRESSURE: 167 MMHG | HEIGHT: 70 IN | OXYGEN SATURATION: 96 %

## 2017-11-15 NOTE — PATIENT PROFILE ADULT. - ABILITY TO HEAR (WITH HEARING AID OR HEARING APPLIANCE IF NORMALLY USED):
Mildly to Moderately Impaired: difficulty hearing in some environments or speaker may need to increase volume or speak distinctly decreased hearing both ears/Mildly to Moderately Impaired: difficulty hearing in some environments or speaker may need to increase volume or speak distinctly

## 2017-11-15 NOTE — PATIENT PROFILE ADULT. - PMH
Aneurysm    CAD (coronary artery disease)    GERD (gastroesophageal reflux disease) Aneurysm    CAD (coronary artery disease)    Foot drop, left    GERD (gastroesophageal reflux disease)

## 2017-11-16 ENCOUNTER — INPATIENT (INPATIENT)
Facility: HOSPITAL | Age: 82
LOS: 0 days | Discharge: HOME | DRG: 269 | End: 2017-11-17
Attending: SURGERY | Admitting: SURGERY
Payer: MEDICARE

## 2017-11-16 ENCOUNTER — APPOINTMENT (OUTPATIENT)
Dept: VASCULAR SURGERY | Facility: HOSPITAL | Age: 82
End: 2017-11-16

## 2017-11-16 DIAGNOSIS — Z98.890 OTHER SPECIFIED POSTPROCEDURAL STATES: Chronic | ICD-10-CM

## 2017-11-16 DIAGNOSIS — Z90.49 ACQUIRED ABSENCE OF OTHER SPECIFIED PARTS OF DIGESTIVE TRACT: Chronic | ICD-10-CM

## 2017-11-16 DIAGNOSIS — Z95.5 PRESENCE OF CORONARY ANGIOPLASTY IMPLANT AND GRAFT: Chronic | ICD-10-CM

## 2017-11-16 LAB
ANION GAP SERPL CALC-SCNC: 10 MMOL/L — SIGNIFICANT CHANGE UP (ref 5–17)
APTT BLD: 140.5 SEC — CRITICAL HIGH (ref 27.5–37.4)
BASE EXCESS BLDA CALC-SCNC: -1.7 MMOL/L — SIGNIFICANT CHANGE UP (ref -2–3)
BLD GP AB SCN SERPL QL: NEGATIVE — SIGNIFICANT CHANGE UP
BUN SERPL-MCNC: 21 MG/DL — SIGNIFICANT CHANGE UP (ref 7–23)
CALCIUM SERPL-MCNC: 7.2 MG/DL — LOW (ref 8.4–10.5)
CHLORIDE SERPL-SCNC: 105 MMOL/L — SIGNIFICANT CHANGE UP (ref 96–108)
CO2 SERPL-SCNC: 26 MMOL/L — SIGNIFICANT CHANGE UP (ref 22–31)
CREAT SERPL-MCNC: 1.04 MG/DL — SIGNIFICANT CHANGE UP (ref 0.5–1.3)
GAS PNL BLDA: SIGNIFICANT CHANGE UP
GLUCOSE SERPL-MCNC: 134 MG/DL — HIGH (ref 70–99)
HCO3 BLDA-SCNC: 24 MMOL/L — SIGNIFICANT CHANGE UP (ref 21–28)
HCT VFR BLD CALC: 23.6 % — LOW (ref 39–50)
HGB BLD-MCNC: 8 G/DL — LOW (ref 13–17)
INR BLD: 1.25 — HIGH (ref 0.88–1.16)
MAGNESIUM SERPL-MCNC: 1.9 MG/DL — SIGNIFICANT CHANGE UP (ref 1.6–2.6)
MCHC RBC-ENTMCNC: 32.8 PG — SIGNIFICANT CHANGE UP (ref 27–34)
MCHC RBC-ENTMCNC: 33.9 G/DL — SIGNIFICANT CHANGE UP (ref 32–36)
MCV RBC AUTO: 96.7 FL — SIGNIFICANT CHANGE UP (ref 80–100)
PCO2 BLDA: 42 MMHG — SIGNIFICANT CHANGE UP (ref 35–48)
PH BLDA: 7.37 — SIGNIFICANT CHANGE UP (ref 7.35–7.45)
PHOSPHATE SERPL-MCNC: 4.4 MG/DL — SIGNIFICANT CHANGE UP (ref 2.5–4.5)
PLATELET # BLD AUTO: 394 K/UL — SIGNIFICANT CHANGE UP (ref 150–400)
PO2 BLDA: 196 MMHG — HIGH (ref 83–108)
POTASSIUM SERPL-MCNC: 4.7 MMOL/L — SIGNIFICANT CHANGE UP (ref 3.5–5.3)
POTASSIUM SERPL-SCNC: 4.7 MMOL/L — SIGNIFICANT CHANGE UP (ref 3.5–5.3)
PROTHROM AB SERPL-ACNC: 13.9 SEC — HIGH (ref 9.8–12.7)
RBC # BLD: 2.44 M/UL — LOW (ref 4.2–5.8)
RBC # FLD: 23.2 % — HIGH (ref 10.3–16.9)
RH IG SCN BLD-IMP: POSITIVE — SIGNIFICANT CHANGE UP
SAO2 % BLDA: 99 % — SIGNIFICANT CHANGE UP (ref 95–100)
SODIUM SERPL-SCNC: 141 MMOL/L — SIGNIFICANT CHANGE UP (ref 135–145)
WBC # BLD: 6.5 K/UL — SIGNIFICANT CHANGE UP (ref 3.8–10.5)
WBC # FLD AUTO: 6.5 K/UL — SIGNIFICANT CHANGE UP (ref 3.8–10.5)

## 2017-11-16 PROCEDURE — 71010: CPT | Mod: 26

## 2017-11-16 PROCEDURE — 34825: CPT | Mod: GC

## 2017-11-16 PROCEDURE — 34803: CPT | Mod: GC

## 2017-11-16 PROCEDURE — 34812 OPN FEM ART EXPOS: CPT | Mod: 50,GC

## 2017-11-16 PROCEDURE — 75952: CPT | Mod: 26,GC

## 2017-11-16 PROCEDURE — 34826: CPT | Mod: GC

## 2017-11-16 PROCEDURE — 93010 ELECTROCARDIOGRAM REPORT: CPT

## 2017-11-16 RX ORDER — HYDRALAZINE HCL 50 MG
10 TABLET ORAL EVERY 8 HOURS
Qty: 0 | Refills: 0 | Status: DISCONTINUED | OUTPATIENT
Start: 2017-11-16 | End: 2017-11-17

## 2017-11-16 RX ORDER — PANTOPRAZOLE SODIUM 20 MG/1
40 TABLET, DELAYED RELEASE ORAL
Qty: 0 | Refills: 0 | Status: DISCONTINUED | OUTPATIENT
Start: 2017-11-16 | End: 2017-11-17

## 2017-11-16 RX ORDER — ACETAMINOPHEN 500 MG
1000 TABLET ORAL ONCE
Qty: 0 | Refills: 0 | Status: COMPLETED | OUTPATIENT
Start: 2017-11-16 | End: 2017-11-16

## 2017-11-16 RX ORDER — HYDROXYUREA 500 MG/1
500 CAPSULE ORAL EVERY 12 HOURS
Qty: 0 | Refills: 0 | Status: DISCONTINUED | OUTPATIENT
Start: 2017-11-16 | End: 2017-11-17

## 2017-11-16 RX ORDER — SODIUM CHLORIDE 9 MG/ML
1000 INJECTION INTRAMUSCULAR; INTRAVENOUS; SUBCUTANEOUS
Qty: 0 | Refills: 0 | Status: DISCONTINUED | OUTPATIENT
Start: 2017-11-16 | End: 2017-11-17

## 2017-11-16 RX ORDER — OXYCODONE AND ACETAMINOPHEN 5; 325 MG/1; MG/1
1 TABLET ORAL EVERY 4 HOURS
Qty: 0 | Refills: 0 | Status: DISCONTINUED | OUTPATIENT
Start: 2017-11-16 | End: 2017-11-17

## 2017-11-16 RX ORDER — CEFAZOLIN SODIUM 1 G
2000 VIAL (EA) INJECTION EVERY 8 HOURS
Qty: 0 | Refills: 0 | Status: COMPLETED | OUTPATIENT
Start: 2017-11-16 | End: 2017-11-17

## 2017-11-16 RX ORDER — ESCITALOPRAM OXALATE 10 MG/1
10 TABLET, FILM COATED ORAL DAILY
Qty: 0 | Refills: 0 | Status: DISCONTINUED | OUTPATIENT
Start: 2017-11-16 | End: 2017-11-17

## 2017-11-16 RX ORDER — ASPIRIN/CALCIUM CARB/MAGNESIUM 324 MG
81 TABLET ORAL DAILY
Qty: 0 | Refills: 0 | Status: DISCONTINUED | OUTPATIENT
Start: 2017-11-16 | End: 2017-11-17

## 2017-11-16 RX ORDER — TEMAZEPAM 15 MG/1
15 CAPSULE ORAL AT BEDTIME
Qty: 0 | Refills: 0 | Status: DISCONTINUED | OUTPATIENT
Start: 2017-11-16 | End: 2017-11-17

## 2017-11-16 RX ORDER — HYDROMORPHONE HYDROCHLORIDE 2 MG/ML
0.5 INJECTION INTRAMUSCULAR; INTRAVENOUS; SUBCUTANEOUS EVERY 4 HOURS
Qty: 0 | Refills: 0 | Status: DISCONTINUED | OUTPATIENT
Start: 2017-11-16 | End: 2017-11-17

## 2017-11-16 RX ORDER — PANTOPRAZOLE SODIUM 20 MG/1
40 TABLET, DELAYED RELEASE ORAL ONCE
Qty: 0 | Refills: 0 | Status: COMPLETED | OUTPATIENT
Start: 2017-11-16 | End: 2017-11-16

## 2017-11-16 RX ORDER — MAGNESIUM SULFATE 500 MG/ML
1 VIAL (ML) INJECTION ONCE
Qty: 0 | Refills: 0 | Status: COMPLETED | OUTPATIENT
Start: 2017-11-16 | End: 2017-11-16

## 2017-11-16 RX ADMIN — PANTOPRAZOLE SODIUM 40 MILLIGRAM(S): 20 TABLET, DELAYED RELEASE ORAL at 13:58

## 2017-11-16 RX ADMIN — Medication 400 MILLIGRAM(S): at 17:50

## 2017-11-16 RX ADMIN — Medication 100 GRAM(S): at 18:27

## 2017-11-16 RX ADMIN — SODIUM CHLORIDE 60 MILLILITER(S): 9 INJECTION INTRAMUSCULAR; INTRAVENOUS; SUBCUTANEOUS at 13:58

## 2017-11-16 RX ADMIN — Medication 100 MILLIGRAM(S): at 22:29

## 2017-11-16 RX ADMIN — Medication 1000 MILLIGRAM(S): at 18:03

## 2017-11-16 NOTE — PROGRESS NOTE ADULT - SUBJECTIVE AND OBJECTIVE BOX
Postoperative check note    Patient seen and examined s/p EVAR. Patient denies f/c/n/v/CP/SOB/abd pain. Pain is adequately controlled.    PE  Gen: NAD, resting comfortably in bed  Pulm: Unlabored breathing, no respiratory distressed  Groin: b/l groin with dressings, no masses, soft, nontender

## 2017-11-16 NOTE — H&P PST ADULT - PMH
Aneurysm    CAD (coronary artery disease)    Foot drop, left    GERD (gastroesophageal reflux disease)

## 2017-11-16 NOTE — H&P PST ADULT - ASSESSMENT
89 yo M with 5.6 infrarenal AAA  - Admit to vascular for endovascular repair of his AAA  - Risk and benefits of the procedure were discussed with the patient and his family and he wants to proceed with the operation.  - Telemetry   - NPO, IVF  - Pre-op labs and xray.

## 2017-11-16 NOTE — BRIEF OPERATIVE NOTE - PROCEDURE
<<-----Click on this checkbox to enter Procedure Endovascular repair for abdominal aortic aneurysm  11/16/2017    Active  MVISMER

## 2017-11-16 NOTE — H&P PST ADULT - HISTORY OF PRESENT ILLNESS
89 yo M with h.o HTN, HLD, CAD, s/p PCI, AS and an 5.4 cm AAA presented for endovascular repair of his AAA. 89 yo M with h.o HTN, HLD, CAD, s/p PCI, AS and an 5.6 cm AAA presented for endovascular repair of his AAA.

## 2017-11-16 NOTE — H&P PST ADULT - RS GEN PE MLT RESP DETAILS PC
respirations non-labored/diminished breath sounds, R/breath sounds equal/diminished breath sounds, L

## 2017-11-17 ENCOUNTER — TRANSCRIPTION ENCOUNTER (OUTPATIENT)
Age: 82
End: 2017-11-17

## 2017-11-17 VITALS — TEMPERATURE: 99 F

## 2017-11-17 LAB
ANION GAP SERPL CALC-SCNC: 11 MMOL/L — SIGNIFICANT CHANGE UP (ref 5–17)
BUN SERPL-MCNC: 21 MG/DL — SIGNIFICANT CHANGE UP (ref 7–23)
CALCIUM SERPL-MCNC: 7.7 MG/DL — LOW (ref 8.4–10.5)
CHLORIDE SERPL-SCNC: 104 MMOL/L — SIGNIFICANT CHANGE UP (ref 96–108)
CO2 SERPL-SCNC: 26 MMOL/L — SIGNIFICANT CHANGE UP (ref 22–31)
CREAT SERPL-MCNC: 1.1 MG/DL — SIGNIFICANT CHANGE UP (ref 0.5–1.3)
GLUCOSE SERPL-MCNC: 119 MG/DL — HIGH (ref 70–99)
HCT VFR BLD CALC: 27 % — LOW (ref 39–50)
HGB BLD-MCNC: 9.1 G/DL — LOW (ref 13–17)
MAGNESIUM SERPL-MCNC: 2.1 MG/DL — SIGNIFICANT CHANGE UP (ref 1.6–2.6)
MCHC RBC-ENTMCNC: 32.6 PG — SIGNIFICANT CHANGE UP (ref 27–34)
MCHC RBC-ENTMCNC: 33.7 G/DL — SIGNIFICANT CHANGE UP (ref 32–36)
MCV RBC AUTO: 96.8 FL — SIGNIFICANT CHANGE UP (ref 80–100)
PHOSPHATE SERPL-MCNC: 3.5 MG/DL — SIGNIFICANT CHANGE UP (ref 2.5–4.5)
PLATELET # BLD AUTO: 356 K/UL — SIGNIFICANT CHANGE UP (ref 150–400)
POTASSIUM SERPL-MCNC: 4.5 MMOL/L — SIGNIFICANT CHANGE UP (ref 3.5–5.3)
POTASSIUM SERPL-SCNC: 4.5 MMOL/L — SIGNIFICANT CHANGE UP (ref 3.5–5.3)
RBC # BLD: 2.79 M/UL — LOW (ref 4.2–5.8)
RBC # FLD: 22.1 % — HIGH (ref 10.3–16.9)
SODIUM SERPL-SCNC: 141 MMOL/L — SIGNIFICANT CHANGE UP (ref 135–145)
WBC # BLD: 5.8 K/UL — SIGNIFICANT CHANGE UP (ref 3.8–10.5)
WBC # FLD AUTO: 5.8 K/UL — SIGNIFICANT CHANGE UP (ref 3.8–10.5)

## 2017-11-17 PROCEDURE — 85730 THROMBOPLASTIN TIME PARTIAL: CPT

## 2017-11-17 PROCEDURE — 82803 BLOOD GASES ANY COMBINATION: CPT

## 2017-11-17 PROCEDURE — 85027 COMPLETE CBC AUTOMATED: CPT

## 2017-11-17 PROCEDURE — 76000 FLUOROSCOPY <1 HR PHYS/QHP: CPT

## 2017-11-17 PROCEDURE — 86901 BLOOD TYPING SEROLOGIC RH(D): CPT

## 2017-11-17 PROCEDURE — 86900 BLOOD TYPING SEROLOGIC ABO: CPT

## 2017-11-17 PROCEDURE — C1773: CPT

## 2017-11-17 PROCEDURE — 80048 BASIC METABOLIC PNL TOTAL CA: CPT

## 2017-11-17 PROCEDURE — 83735 ASSAY OF MAGNESIUM: CPT

## 2017-11-17 PROCEDURE — 85610 PROTHROMBIN TIME: CPT

## 2017-11-17 PROCEDURE — C1874: CPT

## 2017-11-17 PROCEDURE — C1769: CPT

## 2017-11-17 PROCEDURE — 36430 TRANSFUSION BLD/BLD COMPNT: CPT

## 2017-11-17 PROCEDURE — 84100 ASSAY OF PHOSPHORUS: CPT

## 2017-11-17 PROCEDURE — 86850 RBC ANTIBODY SCREEN: CPT

## 2017-11-17 PROCEDURE — C1894: CPT

## 2017-11-17 PROCEDURE — C1725: CPT

## 2017-11-17 PROCEDURE — 71045 X-RAY EXAM CHEST 1 VIEW: CPT

## 2017-11-17 PROCEDURE — C1887: CPT

## 2017-11-17 PROCEDURE — P9016: CPT

## 2017-11-17 PROCEDURE — 93005 ELECTROCARDIOGRAM TRACING: CPT

## 2017-11-17 PROCEDURE — 36415 COLL VENOUS BLD VENIPUNCTURE: CPT

## 2017-11-17 PROCEDURE — 86923 COMPATIBILITY TEST ELECTRIC: CPT

## 2017-11-17 RX ADMIN — OXYCODONE AND ACETAMINOPHEN 1 TABLET(S): 5; 325 TABLET ORAL at 01:38

## 2017-11-17 RX ADMIN — ESCITALOPRAM OXALATE 10 MILLIGRAM(S): 10 TABLET, FILM COATED ORAL at 11:18

## 2017-11-17 RX ADMIN — Medication 100 MILLIGRAM(S): at 06:38

## 2017-11-17 RX ADMIN — OXYCODONE AND ACETAMINOPHEN 1 TABLET(S): 5; 325 TABLET ORAL at 02:38

## 2017-11-17 RX ADMIN — PANTOPRAZOLE SODIUM 40 MILLIGRAM(S): 20 TABLET, DELAYED RELEASE ORAL at 06:38

## 2017-11-17 RX ADMIN — Medication 81 MILLIGRAM(S): at 11:18

## 2017-11-17 RX ADMIN — Medication 30 MILLILITER(S): at 09:58

## 2017-11-17 RX ADMIN — Medication 10 MILLIGRAM(S): at 06:38

## 2017-11-17 NOTE — DISCHARGE NOTE ADULT - PLAN OF CARE
Return to normal activities Follow up with Dr. White in 1-2 weeks. Call the office at  to schedule your appointment. You may remove dressing tomorrow and shower; soap and water over incision sites. Do not scrub. Pat dry when done. Ambulate as tolerated with assistance, no heavy lifting (>10lbs) or strenuous exercise. You may resume regular diet. You should be urinating at least 3-4x per day. Call the office if you experience increasing pain, abdominal or back pain, leg pain/numbness/tingling, redness or warmth over incision site, nausea, vomiting, chills or temperature >101.4F.

## 2017-11-17 NOTE — DISCHARGE NOTE ADULT - MEDICATION SUMMARY - MEDICATIONS TO TAKE
I will START or STAY ON the medications listed below when I get home from the hospital:    aspirin 81 mg oral tablet, chewable  -- 1 tab(s) by mouth once a day  -- Indication: For Circulation    Lexapro 10 mg oral tablet  -- 1 tab(s) by mouth once a day  -- Indication: For Mood    hydroxyurea 500 mg oral capsule  -- 1 cap(s) by mouth every 12 hours  -- Indication: For Antineoplastic agent    temazepam 15 mg oral capsule  -- 2 cap(s) by mouth once a day (at bedtime)  -- Indication: For Anti-anxiety    dicyclomine 10 mg oral capsule  -- orally 2 times a day  -- Indication: For GI    Colace 100 mg oral capsule  -- orally once a day  -- Indication: For Constipation    pantoprazole 40 mg oral delayed release tablet  -- 1 tab(s) by mouth once a day (before a meal)  -- Indication: For GERD (gastroesophageal reflux disease)    hydrALAZINE 10 mg oral tablet  -- 1 tab(s) by mouth every 8 hours  -- Indication: For HTN

## 2017-11-17 NOTE — DISCHARGE NOTE ADULT - PATIENT PORTAL LINK FT
“You can access the Follow[employer] Patient Portal, offered by [facility], by registering with the following website: [email or web address]”

## 2017-11-17 NOTE — PROGRESS NOTE ADULT - SUBJECTIVE AND OBJECTIVE BOX
o/n: 1u PRBC given; resendiz removed at midnight TOV@9am  11/16: s/p EVAR, POC WNL      89 yo M with 5.6 infrarenal AAA s/p EVAR  - regular diet  - TOV@8am  - ancef  -pain control  -cont home meds  -asa o/n: 1u PRBC given; resendiz removed at midnight TOV@9am  11/16: s/p EVAR, POC WNL    aspirin  chewable 81  hydrALAZINE 10      Allergies    No Known Allergies    Intolerances        Vital Signs Last 24 Hrs  T(C): 36.8 (17 Nov 2017 08:48), Max: 37.2 (16 Nov 2017 18:00)  T(F): 98.2 (17 Nov 2017 08:48), Max: 99 (16 Nov 2017 18:00)  HR: 74 (17 Nov 2017 08:27) (60 - 664)  BP: 135/64 (17 Nov 2017 08:27) (101/55 - 152/74)  BP(mean): 89 (16 Nov 2017 16:55) (79 - 103)  RR: 16 (17 Nov 2017 08:27) (15 - 25)  SpO2: 92% (17 Nov 2017 08:27) (92% - 100%)  I&O's Summary    16 Nov 2017 07:01  -  17 Nov 2017 07:00  --------------------------------------------------------  IN: 3295 mL / OUT: 1960 mL / NET: 1335 mL    17 Nov 2017 07:01  -  17 Nov 2017 09:15  --------------------------------------------------------  IN: 480 mL / OUT: 130 mL / NET: 350 mL        Physical Exam:  General: AAOx3, NAD, denies pain  Pulmonary:  Cardiovascular:  Abdominal:  Extremities: bilateral groins soft, no hematoma, mild serosanginous saturation of the groin dressing  Pulses:   Right:                                                                          Left:  FEM [ ]2+ [ ]1+ [ ]doppler                                             FEM [ ]2+ [ ]1+ [ ]doppler    POP [ ]2+ [ ]1+ [ ]doppler                                             POP [ ]2+ [ ]1+ [ ]doppler    DP [ ]2+ [ ]1+ [ ]doppler                                                DP [ ]2+ [ ]1+ [ ]doppler  PT[ ]2+ [ ]1+ [ ]doppler                                                  PT [ ]2+ [ ]1+ [ ]doppler      LABS:                        9.1    5.8   )-----------( 356      ( 17 Nov 2017 06:43 )             27.0     11-17    141  |  104  |  21  ----------------------------<  119<H>  4.5   |  26  |  1.10    Ca    7.7<L>      17 Nov 2017 06:43  Phos  3.5     11-17  Mg     2.1     11-17      PT/INR - ( 16 Nov 2017 13:44 )   PT: 13.9 sec;   INR: 1.25          PTT - ( 16 Nov 2017 13:44 )  PTT:140.5 sec    Radiology and Additional Studies:    89 yo M with 5.6 infrarenal AAA s/p EVAR  - regular diet  - voiding freely  - ancef  -pain control  -cont home meds  -asa  -OOB and ambulating today  -d/c later today

## 2017-11-17 NOTE — DISCHARGE NOTE ADULT - ABILITY TO HEAR (WITH HEARING AID OR HEARING APPLIANCE IF NORMALLY USED):
decreased hearing both ears/Mildly to Moderately Impaired: difficulty hearing in some environments or speaker may need to increase volume or speak distinctly

## 2017-11-17 NOTE — DISCHARGE NOTE ADULT - HOSPITAL COURSE
89 yo M with h/o HTN, HLD, CAD, s/p PCI, AS and an asymptomatic 5.6 cm AAA presented for endovascular repair of his AAA. On 11/16/17 patient underwent endovascular repair of the AAA without complications. Post operative he was monitored and medically managed. On POD#1, today patient is feeling well, all the VS and blood work is within normal limits, the pain is well controlled on oral pain medication, voiding freely after resendiz catheter removal, tolerating diet without nausea, and ambulating independently - patient is stable and ready for discharge today.

## 2017-11-17 NOTE — DISCHARGE NOTE ADULT - CARE PLAN
Principal Discharge DX:	Aneurysm  Goal:	Return to normal activities  Instructions for follow-up, activity and diet:	Follow up with Dr. White in 1-2 weeks. Call the office at  to schedule your appointment. You may remove dressing tomorrow and shower; soap and water over incision sites. Do not scrub. Pat dry when done. Ambulate as tolerated with assistance, no heavy lifting (>10lbs) or strenuous exercise. You may resume regular diet. You should be urinating at least 3-4x per day. Call the office if you experience increasing pain, abdominal or back pain, leg pain/numbness/tingling, redness or warmth over incision site, nausea, vomiting, chills or temperature >101.4F.

## 2017-11-17 NOTE — DISCHARGE NOTE ADULT - CARE PROVIDER_API CALL
Haile White), Surgery; Vascular Surgery  [address]  [address]  [place], [place] [address]  Phone: [phone]  Fax: [phone]

## 2017-11-20 DIAGNOSIS — K21.9 GASTRO-ESOPHAGEAL REFLUX DISEASE WITHOUT ESOPHAGITIS: ICD-10-CM

## 2017-11-20 DIAGNOSIS — I71.4 ABDOMINAL AORTIC ANEURYSM, WITHOUT RUPTURE: ICD-10-CM

## 2017-11-20 DIAGNOSIS — I10 ESSENTIAL (PRIMARY) HYPERTENSION: ICD-10-CM

## 2017-11-20 DIAGNOSIS — Z95.5 PRESENCE OF CORONARY ANGIOPLASTY IMPLANT AND GRAFT: ICD-10-CM

## 2017-11-20 DIAGNOSIS — I25.10 ATHEROSCLEROTIC HEART DISEASE OF NATIVE CORONARY ARTERY WITHOUT ANGINA PECTORIS: ICD-10-CM

## 2017-11-20 DIAGNOSIS — D62 ACUTE POSTHEMORRHAGIC ANEMIA: ICD-10-CM

## 2017-11-20 DIAGNOSIS — J44.9 CHRONIC OBSTRUCTIVE PULMONARY DISEASE, UNSPECIFIED: ICD-10-CM

## 2017-11-20 DIAGNOSIS — I35.0 NONRHEUMATIC AORTIC (VALVE) STENOSIS: ICD-10-CM

## 2017-11-20 DIAGNOSIS — E78.5 HYPERLIPIDEMIA, UNSPECIFIED: ICD-10-CM

## 2017-11-20 DIAGNOSIS — Z79.82 LONG TERM (CURRENT) USE OF ASPIRIN: ICD-10-CM

## 2017-11-20 DIAGNOSIS — M21.372 FOOT DROP, LEFT FOOT: ICD-10-CM

## 2017-11-20 PROBLEM — I72.9 ANEURYSM OF UNSPECIFIED SITE: Chronic | Status: ACTIVE | Noted: 2017-11-15

## 2017-11-21 ENCOUNTER — APPOINTMENT (OUTPATIENT)
Dept: PSYCHIATRY | Facility: CLINIC | Age: 82
End: 2017-11-21

## 2017-11-28 ENCOUNTER — APPOINTMENT (OUTPATIENT)
Dept: PSYCHIATRY | Facility: CLINIC | Age: 82
End: 2017-11-28

## 2017-12-01 ENCOUNTER — OUTPATIENT (OUTPATIENT)
Dept: OUTPATIENT SERVICES | Facility: HOSPITAL | Age: 82
LOS: 1 days | Discharge: HOME | End: 2017-12-01

## 2017-12-01 DIAGNOSIS — Z90.49 ACQUIRED ABSENCE OF OTHER SPECIFIED PARTS OF DIGESTIVE TRACT: Chronic | ICD-10-CM

## 2017-12-01 DIAGNOSIS — Z95.5 PRESENCE OF CORONARY ANGIOPLASTY IMPLANT AND GRAFT: Chronic | ICD-10-CM

## 2017-12-01 DIAGNOSIS — Z98.890 OTHER SPECIFIED POSTPROCEDURAL STATES: Chronic | ICD-10-CM

## 2017-12-04 ENCOUNTER — INPATIENT (INPATIENT)
Facility: HOSPITAL | Age: 82
LOS: 2 days | Discharge: HOME | DRG: 175 | End: 2017-12-07
Attending: INTERNAL MEDICINE | Admitting: INTERNAL MEDICINE
Payer: MEDICARE

## 2017-12-04 ENCOUNTER — APPOINTMENT (OUTPATIENT)
Dept: VASCULAR SURGERY | Facility: CLINIC | Age: 82
End: 2017-12-04

## 2017-12-04 VITALS
OXYGEN SATURATION: 90 % | TEMPERATURE: 97 F | SYSTOLIC BLOOD PRESSURE: 110 MMHG | RESPIRATION RATE: 20 BRPM | DIASTOLIC BLOOD PRESSURE: 73 MMHG | HEART RATE: 101 BPM | WEIGHT: 184.97 LBS

## 2017-12-04 DIAGNOSIS — Z98.890 OTHER SPECIFIED POSTPROCEDURAL STATES: Chronic | ICD-10-CM

## 2017-12-04 DIAGNOSIS — I26.99 OTHER PULMONARY EMBOLISM WITHOUT ACUTE COR PULMONALE: ICD-10-CM

## 2017-12-04 DIAGNOSIS — I71.4 ABDOMINAL AORTIC ANEURYSM, WITHOUT RUPTURE: ICD-10-CM

## 2017-12-04 DIAGNOSIS — Z90.49 ACQUIRED ABSENCE OF OTHER SPECIFIED PARTS OF DIGESTIVE TRACT: Chronic | ICD-10-CM

## 2017-12-04 DIAGNOSIS — Z29.9 ENCOUNTER FOR PROPHYLACTIC MEASURES, UNSPECIFIED: ICD-10-CM

## 2017-12-04 DIAGNOSIS — D47.3 ESSENTIAL (HEMORRHAGIC) THROMBOCYTHEMIA: ICD-10-CM

## 2017-12-04 DIAGNOSIS — I10 ESSENTIAL (PRIMARY) HYPERTENSION: ICD-10-CM

## 2017-12-04 DIAGNOSIS — G47.00 INSOMNIA, UNSPECIFIED: ICD-10-CM

## 2017-12-04 DIAGNOSIS — I50.811 ACUTE RIGHT HEART FAILURE: ICD-10-CM

## 2017-12-04 DIAGNOSIS — F32.9 MAJOR DEPRESSIVE DISORDER, SINGLE EPISODE, UNSPECIFIED: ICD-10-CM

## 2017-12-04 DIAGNOSIS — Z95.5 PRESENCE OF CORONARY ANGIOPLASTY IMPLANT AND GRAFT: Chronic | ICD-10-CM

## 2017-12-04 LAB
ALBUMIN SERPL ELPH-MCNC: 2.9 G/DL — LOW (ref 3.3–5)
ALP SERPL-CCNC: 87 U/L — SIGNIFICANT CHANGE UP (ref 40–120)
ALT FLD-CCNC: 13 U/L — SIGNIFICANT CHANGE UP (ref 10–45)
ANION GAP SERPL CALC-SCNC: 15 MMOL/L — SIGNIFICANT CHANGE UP (ref 5–17)
APTT BLD: 184 SEC — CRITICAL HIGH (ref 27.5–37.4)
APTT BLD: 27.1 SEC — LOW (ref 27.5–37.4)
AST SERPL-CCNC: 30 U/L — SIGNIFICANT CHANGE UP (ref 10–40)
BASE EXCESS BLDA CALC-SCNC: 3.5 MMOL/L — HIGH (ref -2–3)
BASOPHILS NFR BLD AUTO: 0.5 % — SIGNIFICANT CHANGE UP (ref 0–2)
BILIRUB SERPL-MCNC: 0.6 MG/DL — SIGNIFICANT CHANGE UP (ref 0.2–1.2)
BLD GP AB SCN SERPL QL: NEGATIVE — SIGNIFICANT CHANGE UP
BUN SERPL-MCNC: 20 MG/DL — SIGNIFICANT CHANGE UP (ref 7–23)
CALCIUM SERPL-MCNC: 8.2 MG/DL — LOW (ref 8.4–10.5)
CHLORIDE SERPL-SCNC: 100 MMOL/L — SIGNIFICANT CHANGE UP (ref 96–108)
CO2 SERPL-SCNC: 26 MMOL/L — SIGNIFICANT CHANGE UP (ref 22–31)
CREAT SERPL-MCNC: 1.24 MG/DL — SIGNIFICANT CHANGE UP (ref 0.5–1.3)
EOSINOPHIL NFR BLD AUTO: 0.7 % — SIGNIFICANT CHANGE UP (ref 0–6)
GAS PNL BLDA: SIGNIFICANT CHANGE UP
GLUCOSE SERPL-MCNC: 150 MG/DL — HIGH (ref 70–99)
HCO3 BLDA-SCNC: 28 MMOL/L — SIGNIFICANT CHANGE UP (ref 21–28)
HCT VFR BLD CALC: 28.3 % — LOW (ref 39–50)
HGB BLD-MCNC: 9.2 G/DL — LOW (ref 13–17)
INR BLD: 1.18 — HIGH (ref 0.88–1.16)
LYMPHOCYTES # BLD AUTO: 11.9 % — LOW (ref 13–44)
MCHC RBC-ENTMCNC: 32.5 G/DL — SIGNIFICANT CHANGE UP (ref 32–36)
MCHC RBC-ENTMCNC: 32.9 PG — SIGNIFICANT CHANGE UP (ref 27–34)
MCV RBC AUTO: 101.1 FL — HIGH (ref 80–100)
MONOCYTES NFR BLD AUTO: 8.1 % — SIGNIFICANT CHANGE UP (ref 2–14)
NEUTROPHILS NFR BLD AUTO: 78.8 % — HIGH (ref 43–77)
OB PNL STL: NEGATIVE — SIGNIFICANT CHANGE UP
PCO2 BLDA: 41 MMHG — SIGNIFICANT CHANGE UP (ref 35–48)
PH BLDA: 7.45 — SIGNIFICANT CHANGE UP (ref 7.35–7.45)
PLATELET # BLD AUTO: 467 K/UL — HIGH (ref 150–400)
PO2 BLDA: 116 MMHG — HIGH (ref 83–108)
POTASSIUM SERPL-MCNC: 4.2 MMOL/L — SIGNIFICANT CHANGE UP (ref 3.5–5.3)
POTASSIUM SERPL-SCNC: 4.2 MMOL/L — SIGNIFICANT CHANGE UP (ref 3.5–5.3)
PROT SERPL-MCNC: 6.4 G/DL — SIGNIFICANT CHANGE UP (ref 6–8.3)
PROTHROM AB SERPL-ACNC: 13.1 SEC — HIGH (ref 9.8–12.7)
RBC # BLD: 2.8 M/UL — LOW (ref 4.2–5.8)
RBC # FLD: 21.2 % — HIGH (ref 10.3–16.9)
RH IG SCN BLD-IMP: POSITIVE — SIGNIFICANT CHANGE UP
SAO2 % BLDA: 99 % — SIGNIFICANT CHANGE UP (ref 95–100)
SODIUM SERPL-SCNC: 141 MMOL/L — SIGNIFICANT CHANGE UP (ref 135–145)
TROPONIN T SERPL-MCNC: 0.1 NG/ML — CRITICAL HIGH (ref 0–0.01)
TROPONIN T SERPL-MCNC: 0.1 NG/ML — CRITICAL HIGH (ref 0–0.01)
WBC # BLD: 5.5 K/UL — SIGNIFICANT CHANGE UP (ref 3.8–10.5)
WBC # FLD AUTO: 5.5 K/UL — SIGNIFICANT CHANGE UP (ref 3.8–10.5)

## 2017-12-04 PROCEDURE — 93010 ELECTROCARDIOGRAM REPORT: CPT

## 2017-12-04 PROCEDURE — 71010: CPT | Mod: 26

## 2017-12-04 PROCEDURE — 74174 CTA ABD&PLVS W/CONTRAST: CPT | Mod: 26

## 2017-12-04 PROCEDURE — 71275 CT ANGIOGRAPHY CHEST: CPT | Mod: 26

## 2017-12-04 PROCEDURE — 99223 1ST HOSP IP/OBS HIGH 75: CPT | Mod: GC

## 2017-12-04 PROCEDURE — 99291 CRITICAL CARE FIRST HOUR: CPT | Mod: 25

## 2017-12-04 RX ORDER — FUROSEMIDE 40 MG
40 TABLET ORAL ONCE
Qty: 0 | Refills: 0 | Status: COMPLETED | OUTPATIENT
Start: 2017-12-04 | End: 2017-12-04

## 2017-12-04 RX ORDER — HEPARIN SODIUM 5000 [USP'U]/ML
INJECTION INTRAVENOUS; SUBCUTANEOUS
Qty: 25000 | Refills: 0 | Status: DISCONTINUED | OUTPATIENT
Start: 2017-12-04 | End: 2017-12-04

## 2017-12-04 RX ORDER — ASPIRIN/CALCIUM CARB/MAGNESIUM 324 MG
81 TABLET ORAL DAILY
Qty: 0 | Refills: 0 | Status: DISCONTINUED | OUTPATIENT
Start: 2017-12-04 | End: 2017-12-07

## 2017-12-04 RX ORDER — HEPARIN SODIUM 5000 [USP'U]/ML
6500 INJECTION INTRAVENOUS; SUBCUTANEOUS ONCE
Qty: 0 | Refills: 0 | Status: COMPLETED | OUTPATIENT
Start: 2017-12-04 | End: 2017-12-04

## 2017-12-04 RX ORDER — PANTOPRAZOLE SODIUM 20 MG/1
40 TABLET, DELAYED RELEASE ORAL
Qty: 0 | Refills: 0 | Status: DISCONTINUED | OUTPATIENT
Start: 2017-12-04 | End: 2017-12-07

## 2017-12-04 RX ORDER — TEMAZEPAM 15 MG/1
15 CAPSULE ORAL AT BEDTIME
Qty: 0 | Refills: 0 | Status: DISCONTINUED | OUTPATIENT
Start: 2017-12-04 | End: 2017-12-07

## 2017-12-04 RX ORDER — HYDROXYUREA 500 MG/1
500 CAPSULE ORAL EVERY 12 HOURS
Qty: 0 | Refills: 0 | Status: DISCONTINUED | OUTPATIENT
Start: 2017-12-04 | End: 2017-12-07

## 2017-12-04 RX ORDER — HEPARIN SODIUM 5000 [USP'U]/ML
6500 INJECTION INTRAVENOUS; SUBCUTANEOUS EVERY 6 HOURS
Qty: 0 | Refills: 0 | Status: DISCONTINUED | OUTPATIENT
Start: 2017-12-04 | End: 2017-12-05

## 2017-12-04 RX ORDER — HEPARIN SODIUM 5000 [USP'U]/ML
3000 INJECTION INTRAVENOUS; SUBCUTANEOUS EVERY 6 HOURS
Qty: 0 | Refills: 0 | Status: DISCONTINUED | OUTPATIENT
Start: 2017-12-04 | End: 2017-12-05

## 2017-12-04 RX ORDER — DOCUSATE SODIUM 100 MG
100 CAPSULE ORAL DAILY
Qty: 0 | Refills: 0 | Status: DISCONTINUED | OUTPATIENT
Start: 2017-12-04 | End: 2017-12-07

## 2017-12-04 RX ADMIN — HEPARIN SODIUM 1500 UNIT(S)/HR: 5000 INJECTION INTRAVENOUS; SUBCUTANEOUS at 15:32

## 2017-12-04 RX ADMIN — Medication 40 MILLIGRAM(S): at 12:35

## 2017-12-04 RX ADMIN — HEPARIN SODIUM 6500 UNIT(S): 5000 INJECTION INTRAVENOUS; SUBCUTANEOUS at 15:32

## 2017-12-04 NOTE — ED ADULT NURSE REASSESSMENT NOTE - NS ED NURSE REASSESS COMMENT FT1
SPO2 85-90% on NC 4L, pt placed on nonrebreather 11L. SPO2 improving up to 98%. Dr. Mary Grace stacy. Continuing to monitor.

## 2017-12-04 NOTE — H&P ADULT - NSHPSOCIALHISTORY_GEN_ALL_CORE
Former smoker for 50 yrs (smoked 25 cigars/week). Never drinks, never used recreational drugs. Lives alone with home health aid.

## 2017-12-04 NOTE — CONSULT NOTE ADULT - ASSESSMENT
A/P 96 M 89 M Pt of Dr White Hx of CAD x2 s/p stents, HLD, HTN, AS, AAA (s/p EVAR 11/16/17) presented to ED with acute SOB and chronic abd pain worsening over last week. Consulted for risks of anticoagulation for PE vs possible endoleak.    -Aneurysm  stable on CT, proceed with anticoagulation  -No indication for IVC filter at this time as is candidate for anticoagulation  -Vascular will follow  -plan discussed with Chief Resident

## 2017-12-04 NOTE — H&P ADULT - NSHPPHYSICALEXAM_GEN_ALL_CORE
Constitutional: WDWN, NAD, resting comfortably   Head: NC/AT  Eyes: PERRL, EOMI, anicteric sclera, no mucosal pallor  ENT: no nasal discharge; uvula midline, no oropharyngeal erythema or exudates; MMM  Neck: supple; JVD present, no thyromegaly, no lymphadenopathy  Respiratory: CTA B/L; no W/R/R, no retractions  Cardiac: +S1/S2; RRR; no M/R/G; PMI non-displaced  Gastrointestinal: pulsatile aorta felt on deep palpation, abdomen soft, NT/ND; no rebound or guarding; +BSx4  Back: spine midline, no bony tenderness or step-offs; no CVAT B/L  Extremities: warm; no calf tenderness, no pedal edema, no cyanosis, no clubbing  Musculoskeletal: NROM x4; no joint swelling, tenderness or erythema  Vascular: 2+ radial; DP/PT pulses B/L  Dermatologic: no rash, no petechia   Lymphatic: no submandibular or cervical LAD  Neurologic: AAOx3; CNII-XII grossly intact; 5/5 strength throughout, sensory symmetrically intact in B/L LE   Psychiatric: pleasant and conversive; affect and characteristics of appearance, verbalizations, behaviors are appropriate

## 2017-12-04 NOTE — H&P ADULT - PROBLEM SELECTOR PLAN 1
Pulmonary emboli are seen within lobar, segmental and subsegmental branches of each pulmonary lobe bilaterally, with evidence of right heart strain on CT PE and elevated troponin (0.09) and BNP (~88929). Pt currently mentating well, HD stable, s/p heparin bolus, now on heparin gtt  - can switch from BiPaP to HFNC  - c/w heparin gtt, check PTT q4-6hrs, next PTT 8:00 PM  - f/u ECHO and LE venous duplex b/l  - Trend trops to peak  - Holding home 10 mg Hydralazine at this point (per HHA, pt no longer taking it)  - Maintain active T&S, monitor CBC as pt with high bleeding risk given recent GI bleed and EVAR Pulmonary emboli are seen within lobar, segmental and subsegmental branches of each pulmonary lobe bilaterally, with evidence of right heart strain on CT PE and elevated troponin (0.09) and BNP (~15364). 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 Pt currently mentating well, HD stable, s/p heparin bolus, now on heparin gtt  - can switch from BiPaP to HFNC  - c/w heparin gtt, check PTT q4-6hrs, next PTT 8:00 PM  - f/u ECHO and LE venous duplex b/l  - Trend trops to peak  - Holding home 10 mg Hydralazine at this point (per HHA, pt no longer taking it)  - Maintain active T&S, monitor CBC as pt with high bleeding risk given recent GI bleed and EVAR

## 2017-12-04 NOTE — H&P ADULT - PROBLEM SELECTOR PLAN 3
Diet: Regular diet as tolerated   DVT ppx: Pt on full anticoagulation with Hep drip  Lyte: Replete PRN with goal K>4 and Mg >2  DISPO: Pt with Hx of thrombocytosis (baseline 350-570K from past admission).  -c/w home hydroxyurea 500 mg PO Q12 - Vascular surgery assessed pt in ED, CT A/P showing small endoleak , f/u recs regarding possible IVF filter placement. - Vascular surgery assessed pt in ED, CT A/P showing small endoleak , recommend to anticoagulate, no IVC filter needed

## 2017-12-04 NOTE — H&P ADULT - PROBLEM SELECTOR PLAN 2
- Vascular surgery assessed pt in ED, CT A/P showing small endoleak , f/u recs regarding possible IVF filter placement Evidence of right heart strain on CT PE and elevated troponin (0.09) and BNP (~29937). EKG with sinus rhythm at 88, ST wave inversion in anterior lateral leads (II, III, aVF, V3-V5). Pt relatively hypotensive compared to prior admission   -Monitor morning EKG and trend trops

## 2017-12-04 NOTE — H&P ADULT - PROBLEM SELECTOR PLAN 8
Diet: Regular diet as tolerated  Bowel regiment: Colace PRN   DVT ppx: Pt on full anticoagulation with Hep drip  Lyte: Replete PRN with goal K>4 and Mg >2  DISPO:

## 2017-12-04 NOTE — ED PROVIDER NOTE - OBJECTIVE STATEMENT
Pt is a 89yo m, h/o htn, hld, cad s/p stent, gastritis/ gerd, copd not on home o2, aaa s/p repair on recent admission, who p/w c/o generalized weakness, chronic abd pain, mild sob. Pt states he has abd pain on daily basis, usually to lower abd, no allev/ aggrav factors, + occasional nausea, no vomiting. + diarrhea x 1, no blood/ melena. No fever/ chills. No urinary sx's. + intermittent sob, no associated chest pain, palp, cough. No leg pain/ swelling.

## 2017-12-04 NOTE — ED ADULT NURSE NOTE - OBJECTIVE STATEMENT
Pt c/o shortness of breath, weakness and nausea this morning, aide called 911. Pt also c/o lower abdominal pain for several days, described as mild, and denies abd pain on palpation. Pt's aide states he had two episodes of diarrhea today. Denies vomiting. Pt had vascular surgery here a couple of weeks ago, has staples to bilateral pelvis near groin. Staples are intact, no drainage or bleeding now or reported by aide, no swelling or signs of infection. Pt denies pain to surgical sites. Pt A&Ox3, calm and cooperative. Pt speaking in short sentences, tachypneic. As per pt's aide, pt only takes Aspirin 81mg PO daily, hx of coronary stent.

## 2017-12-04 NOTE — H&P ADULT - PROBLEM SELECTOR PLAN 5
c/w lexapro 10 mg PO daily Per HHA, pt no longer taking Hydralazine 10 mg. Pt normotensive since admission  -Holding meds for now, will consider restarting if pt becomes hypertensive

## 2017-12-04 NOTE — H&P ADULT - ASSESSMENT
89 yo M with PMH of CAD s/p stent (>10 years ago), moderate AS, essential thrombocytosis (on hydroxyurea), AAA s/p EVAR on 11/16/17, and hx of GI bleed 3 weeks ago, presents with CALDWELL, found to have bilateral PE

## 2017-12-04 NOTE — H&P ADULT - NSHPREVIEWOFSYSTEMS_GEN_ALL_CORE
CV: Denies chest pain, palpitations  RESP: Denies SOB  GI: Denies abdominal pain, constipation, diarrhea, nausea, vomiting  : Denies dysuria, hematuria, flank or back pain  ID: Denies fevers, chills  MSK: Denies joint pain   DERM: Denies any rashes, bruising, pruritis  NEURO: No headaches, blurry vision, double vision  ENDO: Denies heat or cold intolerances, changes in weight, thinning of hair  PSYCH: Denies any mood changes

## 2017-12-04 NOTE — H&P ADULT - HISTORY OF PRESENT ILLNESS
89 yo M with h/o HTN, HLD, CAD s/p stent (>10 years ago), AS, AAA, and essential thrombocytosis, and an asymptomatic 5.6 cm AAA s/p endovascular repair without complications on 11/16. He was previllously admitted on 11/7 with melena and anemia (hgb 6), received 2 units pRBC and Hgb stabilized above 8.  Upper Endoscopy at the time showed gastritis and hiatal hernia, no source of bleeding was noted. Colonoscopy was performed and was unremarkable. On CT, pt was noted to have 2 mm progression of AAA (5.4cm) and was evaluated by Vascular Surgery for EVAR. GI cleared pt for vascular procedure.  Pt was seen by Dr Hsu, Cardiology, for cardiac clearance.  An echo was performed that showed progression of AS from mild to moderate, small pericardial effusion, EF 55-60%. 89 yo M with h/o HTN, HLD, CAD s/p stent (>10 years ago), AS, AAA, and essential thrombocytosis, and an asymptomatic 5.6 cm AAA s/p endovascular repair on 11/16; now presenting with generalized weakness and persistent abdominal pain since discharge from prior hospitalization. He was previllously admitted on 11/7 with melena and anemia (hgb 6), received 2 units pRBC and Hgb stabilized above 8.  Upper Endoscopy at the time showed gastritis and hiatal hernia, no source of bleeding was noted. Colonoscopy was performed and was unremarkable. On CT, pt was noted to have 2 mm progression of AAA (5.4cm) and was evaluated by Vascular Surgery for EVAR. GI cleared pt for vascular procedure.  Pt was seen by Dr Hsu, Cardiology, for cardiac clearance.  An echo was performed that showed progression of AS from mild to moderate, small pericardial effusion, EF 55-60%.    Pt states that since his last discharge, he has been experiencing generalized weakness and abdominal pain. The pain is 2/10, achy, non radiating, with no alleviating or exacerbating factors. He has had episodes of vomiting, non bilious, non bloody. Per home aid, PT noticed him desatting to low 90s while working with him at home. ROS otherwise negative.   In ED, T 97.4, , /73, RR 20, SpO2 90% on RA. CBC with macrocytic anemia (H/H 9.2/28.3), CMP unremarkable, Cardiac enzymes with Trop I 0.90, pro-BNP 31215.  ABG wnl. CTA with innumerable pulmonary emboli, right heart strain; small endoleak; mild cardiomegaly; mall pericardial effusion. Pt was placed on BIPAP for desatting to 80s. 89 yo M with h/o HTN, HLD, CAD s/p stent (>10 years ago), AS, AAA, and essential thrombocytosis, and an asymptomatic 5.6 cm AAA s/p endovascular repair on 11/16; now presenting with generalized weakness and persistent abdominal pain since discharge from prior hospitalization. He was previllously admitted on 11/7 with melena and anemia (hgb 6), received 2 units pRBC and Hgb stabilized above 8.  Upper Endoscopy at the time showed gastritis and hiatal hernia, no source of bleeding was noted. Colonoscopy was performed and was unremarkable. On CT, pt was noted to have 2 mm progression of AAA (5.4cm) and was evaluated by Vascular Surgery for EVAR. GI cleared pt for vascular procedure.  Pt was seen by Dr Hsu, Cardiology, for cardiac clearance.  An echo was performed that showed progression of AS from mild to moderate, small pericardial effusion, EF 55-60%.    Pt states that since his last discharge, he has been experiencing generalized weakness and abdominal pain. The pain is 2/10, achy, non radiating, with no alleviating or exacerbating factors. He has had episodes of vomiting, non bilious, non bloody. Per home aid, PT noticed him desatting to low 90s while working with him at home. ROS otherwise negative. Per HHA, pt has been more SOB with labored breathing when he walks. Patient himself denies any chest pain, palpitations, dizziness, LE pain or swelling. Denies personal or family history of blood clots.   In ED, T 97.4, , /73, RR 20, SpO2 90% on RA. CBC with macrocytic anemia (H/H 9.2/28.3), CMP unremarkable, Cardiac enzymes with Trop I 0.90, pro-BNP 46337.  ABG wnl. CTA with innumerable pulmonary emboli, right heart strain; small endoleak; mild cardiomegaly; mall pericardial effusion. Pt was placed on BIPAP for desatting to 80s. 89 yo M with h/o HTN, HLD, CAD s/p stent (>10 years ago), AS, AAA, and essential thrombocytosis, and an asymptomatic 5.6 cm AAA s/p endovascular repair on 11/16; now presenting with generalized weakness and persistent abdominal pain since discharge from prior hospitalization. He was previllously admitted on 11/7 with melena and anemia (hgb 6), received 2 units pRBC and Hgb stabilized above 8.  Upper Endoscopy at the time showed gastritis and hiatal hernia, no source of bleeding was noted. Colonoscopy was performed and was unremarkable. On CT, pt was noted to have 2 mm progression of AAA (5.4cm) and was evaluated by Vascular Surgery for EVAR. GI cleared pt for vascular procedure.  Pt was seen by Dr Hsu, Cardiology, for cardiac clearance.  An echo was performed that showed progression of AS from mild to moderate, small pericardial effusion, EF 55-60%.    Pt states that since his last discharge, he has been experiencing generalized weakness and abdominal pain. The pain is 2/10, achy, non radiating, with no alleviating or exacerbating factors. He has had episodes of vomiting, non bilious, non bloody. Per home aid, PT noticed him desatting to low 90s while working with him at home. ROS otherwise negative. Per HHA, pt has been more SOB with labored breathing when he walks. Patient himself denies any chest pain, palpitations, dizziness, LE pain or swelling. Denies personal or family history of blood clots.   In ED, T 97.4, , /73, RR 20, SpO2 90% on RA. CBC with macrocytic anemia (H/H 9.2/28.3), CMP unremarkable, Cardiac enzymes with Trop I 0.90, pro-BNP 48454.  ABG wnl. CTA with innumerable pulmonary emboli, right heart strain; small endoleak; mild cardiomegaly; mall pericardial effusion. Pt was placed on BIPAP for desatting to 80s.

## 2017-12-04 NOTE — CONSULT NOTE ADULT - SUBJECTIVE AND OBJECTIVE BOX
ICU Consult Medicine Resident Note  89 yo male PMH of CAD s/p stent (>10 years ago), moderate AS, essential thrombocytosis (on hydroxyurea), AAA s/p endovascular repair on 11/16/17, and hx of GI bleed 3 weeks ago. Pt had EVAR 2 weeks ago with no complications, but since the EVAR two weeks ago, patient has not been ambulating well due to abdominal discomfort. Per HHA at bedside, pt has been more SOB with labored breathing when he walks. Patient himself denies any chest pain, palpitations, dizziness, LE pain or swelling. Denies personal or family history of blood clots. Of note, pt had melena 3 weeks ago and underwent EGD showing gastritis and hiatal hernia and colonoscopy showing diverticulosis on 11/09/17, no source of bleeding was noted. Pt denies any melena, BRBPR, hematemesis since then.    Past Medical History: CAD, AS, essential thrombocytosis, AAA, hx of GI bleed  Past Surgical History: EVAR on 11/16/17  Medications: aspirin, lexapro, hydroxyurea, temazepam, dicyclomine, pantoprazole, hydralazine, colace  Allergies: NKA  Social History: Smoked cigars for 25yrs 1box/wk; quit 50yrs ago. EtOH: quit 50yrs ago, had 3-4 drinks/wk  Family History: Noncontributory    Physical Examination:   Vital Signs Last 24 Hrs  T(C): 36.6 (04 Dec 2017 16:42), Max: 36.6 (04 Dec 2017 16:42)  T(F): 97.9 (04 Dec 2017 16:42), Max: 97.9 (04 Dec 2017 16:42)  HR: 73 (04 Dec 2017 16:42) (73 - 101)  BP: 146/82 (04 Dec 2017 16:42) (110/73 - 146/82)  BP(mean): --  RR: 18 (04 Dec 2017 16:42) (18 - 27)  SpO2: 100% (04 Dec 2017 16:42) (90% - 100%)  I&O's Detail        Constitutional: WDWN, NAD on BiPaP  Head: NC/AT  Eyes: PERRLA, EOMI, clear conjunctiva  ENT: no nasal discharge; no oropharyngeal erythema or exudates; dry oral mucosa  Neck: supple; no JVD or thyromegaly  Respiratory: on BiPaP, CTA B/L; no W/R/R, no intercostal retractions  Cardiac: +S1/S2; RRR; no M/R/G; PMI non-displaced  Gastrointestinal: soft, NT/ND; no rebound or guarding; +BS, no hepatosplenomegaly. Incisions at b/l groin- clean and dry, no surrounding erythema, no fluctuance, no drainage  Extremities: WWP, no clubbing or cyanosis; no peripheral edema. Feet are cold to touch, Lower extremities look symmetrical, no calf tenderness  Vascular: 2+ radial, DP/PT pulses B/L  Lymphatic: no submandibular or cervical LAD  Neurologic: AAOx3; answers questions appropriately, follows commands, moves all extremities       ABG - ( 04 Dec 2017 12:26 )  pH: 7.45  /  pCO2: 41    /  pO2: 116   / HCO3: 28    / Base Excess: 3.5   /  SaO2: 99                CARDIAC MARKERS ( 04 Dec 2017 15:23 )  x     / 0.10 ng/mL / x     / x     / x      CARDIAC MARKERS ( 04 Dec 2017 10:30 )  x     / 0.09 ng/mL / x     / x     / x          CBC Full  -  ( 04 Dec 2017 10:30 )  WBC Count : 5.5 K/uL  Hemoglobin : 9.2 g/dL  Hematocrit : 28.3 %  Platelet Count - Automated : 467 K/uL  Mean Cell Volume : 101.1 fL  Mean Cell Hemoglobin : 32.9 pg  Mean Cell Hemoglobin Concentration : 32.5 g/dL  Auto Neutrophil # : x  Auto Lymphocyte # : x  Auto Monocyte # : x  Auto Eosinophil # : x  Auto Basophil # : x  Auto Neutrophil % : 78.8 %  Auto Lymphocyte % : 11.9 %  Auto Monocyte % : 8.1 %  Auto Eosinophil % : 0.7 %  Auto Basophil % : 0.5 %    12-04    141  |  100  |  20  ----------------------------<  150<H>  4.2   |  26  |  1.24    Ca    8.2<L>      04 Dec 2017 10:30    TPro  6.4  /  Alb  2.9<L>  /  TBili  0.6  /  DBili  x   /  AST  30  /  ALT  13  /  AlkPhos  87  12-04    LIVER FUNCTIONS - ( 04 Dec 2017 10:30 )  Alb: 2.9 g/dL / Pro: 6.4 g/dL / ALK PHOS: 87 U/L / ALT: 13 U/L / AST: 30 U/L / GGT: x           PT/INR - ( 04 Dec 2017 10:30 )   PT: 13.1 sec;   INR: 1.18          PTT - ( 04 Dec 2017 10:30 )  PTT:27.1 sec    Labs/Imaging:    CTA Chest/ abdomen/ pelvis    FINDINGS: Pulmonary emboli are seen within lobar, segmental and   subsegmental branches of each pulmonary lobe bilaterally. The main   pulmonary trunk is dilated measuring up to 4.5 cm consistent with   pulmonary hypertension. Leftward septal bowing and mild contrast reflux   into the IVC is consistent with right heart strain.    There is stable aneurysmal dilatation of the thoracic aorta measuring up   to 4.5 x 4.0 cm (AP x transverse) at the sinuses of Valsalva and 4.0 x   4.1 cm (AP x transverse) at the mid ascending thoracic aorta. Evaluation   of the abdominal aorta demonstrates patient is status post aortobiiliac   stent grafting of a saccular infrarenal abdominal aortic aneurysm. This   aneurysm currently measures 5.1 x 5.4 cm (AP x transverse) with an   involved length of 5.2 cm without significant change since 11/7/2017 at   which time it measured 5.2 x 5.4 cm. Faint contrast enhancement within   the excluded lumen of the aneurysm likely represents an endoleak (image   98, series 7). 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 Severe calcified and noncalcified plaque is seen   throughout the aorta and its great branches.    The heart is mildly enlarged. There is a small pericardial effusion,   increased compared to 11/7/2017. Dense coronary and aortic valvular   calcifications are noted. No mediastinal, hilar or axillary   lymphadenopathy is seen.    Evaluation of the pulmonary recommend demonstrates trace bilateral   pleural effusions, right greater than left. There is right middle lobe   collapse and subtotal collapse of the basilar segments of the right lower   lobe, likely secondary to the elevation of the right hemidiaphragm.   Subpleural reticular opacities bilaterally again suggest interstitial   lung disease. A 9 mm subpleural left lower lobe nodule is not   significantly changed since 9/23/2016.    Images of the upper abdomen demonstrate no focal hepatic abnormalities.   Patient is status post cholecystectomy. The pancreas is normal in   appearance. Subcapsular calcification spleen is probably from an old   subcapsular hematoma. A nonspecific subcentimeter hypodensities noted in   the spleen.    The adrenal glands are unremarkable. Bilateral renal cysts are noted.   There is a homogeneous 2.5 cm hyperdense lesion in the posterior aspect   of the mid right kidney, likely a hyperdense cyst. There is a 0.9 cm   hyperdense lesion in the anterior mid to lower right kidney, most likely   a hyperdense cyst. There is a 1.5 cm hyperdense lesion at the posterior   lower left kidney, possibly a hyperdense cyst. Indeterminate   subcentimeter renal hypodensities are noted bilaterally. No   lymphadenopathy is seen.    Evaluation of the bowel is limited without oral contrast. Within that   limitation, these images demonstrate colonic diverticulosis. No ascites   is seen.     Images of the pelvis demonstrate postoperative changes and seroma in the   inguinal region bilaterally. Skin staples are still present in the groin   bilaterally. Prostate is mildly enlarged. Seminal vesicles are   unremarkable. Mild wall thickening of the urinary bladder is evident.    Evaluation of the osseous structures demonstrates prior lumbar spinal   fusion. Lucencies are seen around the most inferior screws at L3   suggesting loosening. Severe degenerative changes are seen in the spine.      IMPRESSION:  1.  Innumerable pulmonary emboli. Right heart strain.  2.  Status post abdominal aorta biiliac endoluminal stent graft. Small   endoleak.  3.  Mild cardiomegaly. Small pericardial effusion.  4.  Mild elevation of the right hemidiaphragm causing right lower lobe   collapse and subtotal collapse of the right lower lobe basal segments.  5.  Multiple hyperdense renal lesions.  6.  Loosening of the lumbar fusion screws at L3 without change since   9/23/2016    EKG: normal sinus, left axis deviation, T wave inversion in inferior and anterior leads - unchanged from prior EKGs

## 2017-12-04 NOTE — ED ADULT NURSE NOTE - CHPI ED SYMPTOMS NEG
no fever/no cough/no wheezing/no chills/no body aches/no chest pain/no headache/no hemoptysis/no edema

## 2017-12-04 NOTE — H&P ADULT - PROBLEM SELECTOR PLAN 7
Diet: Regular diet as tolerated  Bowel regiment: Colace PRN   DVT ppx: Pt on full anticoagulation with Hep drip  Lyte: Replete PRN with goal K>4 and Mg >2  DISPO: c/w home temazepam 15 mg PRN for insomnia

## 2017-12-04 NOTE — H&P ADULT - NSHPLABSRESULTS_GEN_ALL_CORE
9.2    5.5   )-----------( 467      ( 04 Dec 2017 10:30 )             28.3     12-04    141  |  100  |  20  ----------------------------<  150<H>  4.2   |  26  |  1.24    Ca    8.2<L>      04 Dec 2017 10:30    TPro  6.4  /  Alb  2.9<L>  /  TBili  0.6  /  DBili  x   /  AST  30  /  ALT  13  /  AlkPhos  87  12-04    PT/INR - ( 04 Dec 2017 10:30 )   PT: 13.1 sec;   INR: 1.18          PTT - ( 04 Dec 2017 10:30 )  PTT:27.1 sec      RADIOLOGY & ADDITIONAL TESTS:    < from: CT Angio Chest PE Protocol w/ IV Cont (12.04.17 @ 14:23) >    1.  Innumerable pulmonary emboli. Right heart strain.  2.  Status post abdominal aorta biiliac endoluminal stent graft. Small   endoleak.  3.  Mild cardiomegaly. Small pericardial effusion.  4.  Mild elevation of the right hemidiaphragm causing right lower lobe   collapse and subtotal collapse of the right lower lobe basal segments.  5.  Multiple hyperdense renal lesions.  6.  Looseningof the lumbar fusion screws at L3 without change since   9/23/2016    < end of copied text >

## 2017-12-04 NOTE — H&P ADULT - PROBLEM SELECTOR PLAN 4
Per [facility], pt no longer taking Hydralazine 10 mg. Pt normotensive since admission  -Holding meds for now, will consider restarting if pt becomes hypertensive Pt with Hx of thrombocytosis (baseline 350-570K from past admission).  -c/w home hydroxyurea 500 mg PO Q12

## 2017-12-04 NOTE — ED PROVIDER NOTE - MEDICAL DECISION MAKING DETAILS
Impression: acute b/l pulmonary emboli w/ associated r heart strain. + hypoxic and mildly tachypneic, improved on bipap. CTA c/a/p + for numerous b/l pe's; + small endo leak. Pt seen by vascular and icu. PERT consult obtained as well. Pt started on heparin bolus drip. No evidence of blood on rectal exam and guaiac. Will admit to icu step down for further monitoring and management.

## 2017-12-04 NOTE — CHART NOTE - NSCHARTNOTEFT_GEN_A_CORE
PTT supra-therapeutic to 184. Holding heparin gtt for 1 hour. Will repeat and re-dose accordingly. Patient without any active bleeding, hemodynamically stable.

## 2017-12-04 NOTE — ED PROVIDER NOTE - CRITICAL CARE PROVIDED
direct patient care (not related to procedure)/additional history taking/interpretation of diagnostic studies/consultation with other physicians/documentation

## 2017-12-04 NOTE — ED ADULT TRIAGE NOTE - ARRIVAL INFO ADDITIONAL COMMENTS
Denies any chest pain. Speaking in full sentences. Patient reports nausea at home, denies any at present time. Patient is pale on arrival. EKG in progress.

## 2017-12-04 NOTE — ED PROVIDER NOTE - DIAGNOSTIC INTERPRETATION
ER Physician: June Ree  CHEST XRAY INTERPRETATION: low lung volumes, lungs clear, heart shadow normal, bony structures intact

## 2017-12-04 NOTE — ED PROVIDER NOTE - PHYSICAL EXAMINATION
VITAL SIGNS: I have reviewed nursing notes and confirm.  CONSTITUTIONAL: Well-developed; well-nourished; in no acute distress, mildly tachypneic.  SKIN:  warm and dry, no acute rash.   HEAD:  normocephalic, atraumatic.  EYES: PERRL, EOM intact; conjunctiva and sclera clear.  ENT: No nasal discharge; airway clear.   NECK: Supple; non tender.  CARD: S1, S2 normal; no murmurs, gallops, or rubs. Regular rate and rhythm.   RESP:  + rales to r lung base, no wheezes/ rhonchi.  ABD: Normal bowel sounds; soft; non-distended; non-tender; no guarding/ rebound.  EXT: Surgical wounds to b/l inguinal region healing well, c/d/i. Normal ROM. Trace pedal edema b/l. 2+ pulses to b/l ue/le.  NEURO: Alert, oriented, grossly unremarkable  PSYCH: Cooperative, mood and affect appropriate.

## 2017-12-04 NOTE — CONSULT NOTE ADULT - SUBJECTIVE AND OBJECTIVE BOX
89 M Hx of CAD x2 s/p stents, HLD, HTN, AS, AAA (s/p EVAR 11/16/17) presented to ED with acute SOB and chronic abd pain worsening over last week. Pt of Dr White, who was to return to office today for post operative visit and have staples removed. Due to acute SOB since this am presented to [facility] ED. He is normally limited functionally due to SOB however over the last week his tolerance has decreased to in bed movements only. Unable to sleep lying flat and awakes many times in the night needing to sit upright. He has long standing abdominal pain which has increased since surgery. He describes it as crampy periumbilical pain. He has regular bowel movements and is able to void with no change in pattern. He has chronic foot drop to L side x >10years. He denies leg pain, changes in strength, sensation to LE.  He denies current CP, Nausea, vomiting, fever/ chills, current abd pain.    Vital Signs Last 24 Hrs  T(C): 36.6 (04 Dec 2017 16:42), Max: 36.6 (04 Dec 2017 16:42)  T(F): 97.9 (04 Dec 2017 16:42), Max: 97.9 (04 Dec 2017 16:42)  HR: 85 (04 Dec 2017 19:49) (72 - 101)  BP: 137/78 (04 Dec 2017 19:49) (110/73 - 146/82)  BP(mean): --  RR: 24 (04 Dec 2017 19:49) (18 - 27)  SpO2: 100% (04 Dec 2017 19:49) (90% - 100%)    PE:   General: A/O x4, NAD  RESP: LS clear and equal, on BiPAP  CV: RRR  ABD: Soft, NT/ND, no palpable masses  Groin: BL incisions with mild erythema, staples in place, CDI.   LE: L foot drop, skin cool, pale. sensation and motor intact    Pulses: BL palp Femoral, Popliteal, DP     < from: CT Angio Abdomen and Pelvis w/ IV Cont (12.04.17 @ 14:37) >  IMPRESSION:  1.  Innumerable pulmonary emboli. Right heart strain.  2.  Status post abdominal aorta biiliac endoluminal stent graft. Small   endoleak.  3.  Mild cardiomegaly. Small pericardial effusion.  4.  Mild elevation of the right hemidiaphragm causing right lower lobe   collapse and subtotal collapse of the right lower lobe basal segments.  5.  Multiple hyperdense renal lesions.    < from: Xray Chest 1 View AP/PA (12.04.17 @ 10:53) >  IMPRESSION:  Mild pulmonary venous congestion. Elevated right hemidiaphragm.    < end of copied text >    6.  Looseningof the lumbar fusion screws at L3 without change since   9/23/2016      < end of copied text >

## 2017-12-04 NOTE — CONSULT NOTE ADULT - ASSESSMENT
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 Pt currently mentating well, HD stable, s/p heparin bolus, now on heparin gtt  - can switch from BiPaP to HFNC  - c/w heparin gtt, check PTT q4-6hrs  - Obtain ECHO and LE venous duplex b/l  - Trend trops to peak  - would hold home Hydralazine at this point  - Vascular surgery assessed pt in ED, CT A/P showing small endoleak , f/u recs regarding possible IVF filter placement  - Maintain active T&S, monitor CBC as pt with high bleeding risk given recent GI bleed and EVAR  - Admit to 7[facility] under Dr. 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 Pt currently mentating well, HD stable, s/p heparin bolus, now on heparin gtt  - can switch from BiPaP to HFNC  - c/w heparin gtt, check PTT q4-6hrs  - Obtain ECHO and LE venous duplex b/l  - Trend trops to peak  - would hold home Hydralazine at this point  - Vascular surgery assessed pt in ED, CT A/P showing small endoleak , f/u recs regarding possible IVF filter placement  - Maintain active T&S, monitor CBC as pt with high bleeding risk given recent GI bleed and EVAR  - Admit to 7[facility] under Dr. Cecil GUSTAFSON and start heparin.  Hold on CDI at this point as the patient is stable and has comorbidities with recent surgery

## 2017-12-05 ENCOUNTER — APPOINTMENT (OUTPATIENT)
Dept: PSYCHIATRY | Facility: CLINIC | Age: 82
End: 2017-12-05

## 2017-12-05 DIAGNOSIS — I27.20 PULMONARY HYPERTENSION, UNSPECIFIED: ICD-10-CM

## 2017-12-05 DIAGNOSIS — J96.00 ACUTE RESPIRATORY FAILURE, UNSPECIFIED WHETHER WITH HYPOXIA OR HYPERCAPNIA: ICD-10-CM

## 2017-12-05 LAB
ANION GAP SERPL CALC-SCNC: 15 MMOL/L — SIGNIFICANT CHANGE UP (ref 5–17)
APTT BLD: 35.7 SEC — SIGNIFICANT CHANGE UP (ref 27.5–37.4)
APTT BLD: 39.5 SEC — HIGH (ref 27.5–37.4)
APTT BLD: 40.9 SEC — HIGH (ref 27.5–37.4)
BUN SERPL-MCNC: 20 MG/DL — SIGNIFICANT CHANGE UP (ref 7–23)
CALCIUM SERPL-MCNC: 8.2 MG/DL — LOW (ref 8.4–10.5)
CHLORIDE SERPL-SCNC: 99 MMOL/L — SIGNIFICANT CHANGE UP (ref 96–108)
CO2 SERPL-SCNC: 28 MMOL/L — SIGNIFICANT CHANGE UP (ref 22–31)
CREAT SERPL-MCNC: 1.21 MG/DL — SIGNIFICANT CHANGE UP (ref 0.5–1.3)
GLUCOSE SERPL-MCNC: 95 MG/DL — SIGNIFICANT CHANGE UP (ref 70–99)
HCT VFR BLD CALC: 27.9 % — LOW (ref 39–50)
HGB BLD-MCNC: 8.9 G/DL — LOW (ref 13–17)
MAGNESIUM SERPL-MCNC: 2 MG/DL — SIGNIFICANT CHANGE UP (ref 1.6–2.6)
MCHC RBC-ENTMCNC: 31.9 G/DL — LOW (ref 32–36)
MCHC RBC-ENTMCNC: 32.6 PG — SIGNIFICANT CHANGE UP (ref 27–34)
MCV RBC AUTO: 102.2 FL — HIGH (ref 80–100)
PHOSPHATE SERPL-MCNC: 4.3 MG/DL — SIGNIFICANT CHANGE UP (ref 2.5–4.5)
PLATELET # BLD AUTO: 485 K/UL — HIGH (ref 150–400)
POTASSIUM SERPL-MCNC: 3.9 MMOL/L — SIGNIFICANT CHANGE UP (ref 3.5–5.3)
POTASSIUM SERPL-SCNC: 3.9 MMOL/L — SIGNIFICANT CHANGE UP (ref 3.5–5.3)
RBC # BLD: 2.73 M/UL — LOW (ref 4.2–5.8)
RBC # FLD: 21.6 % — HIGH (ref 10.3–16.9)
SODIUM SERPL-SCNC: 142 MMOL/L — SIGNIFICANT CHANGE UP (ref 135–145)
WBC # BLD: 4.3 K/UL — SIGNIFICANT CHANGE UP (ref 3.8–10.5)
WBC # FLD AUTO: 4.3 K/UL — SIGNIFICANT CHANGE UP (ref 3.8–10.5)

## 2017-12-05 PROCEDURE — 93970 EXTREMITY STUDY: CPT | Mod: 26

## 2017-12-05 PROCEDURE — 99233 SBSQ HOSP IP/OBS HIGH 50: CPT | Mod: GC

## 2017-12-05 PROCEDURE — 93010 ELECTROCARDIOGRAM REPORT: CPT

## 2017-12-05 PROCEDURE — 93306 TTE W/DOPPLER COMPLETE: CPT | Mod: 26

## 2017-12-05 RX ORDER — HEPARIN SODIUM 5000 [USP'U]/ML
800 INJECTION INTRAVENOUS; SUBCUTANEOUS
Qty: 25000 | Refills: 0 | Status: DISCONTINUED | OUTPATIENT
Start: 2017-12-05 | End: 2017-12-05

## 2017-12-05 RX ORDER — ENOXAPARIN SODIUM 100 MG/ML
80 INJECTION SUBCUTANEOUS
Qty: 0 | Refills: 0 | Status: DISCONTINUED | OUTPATIENT
Start: 2017-12-05 | End: 2017-12-06

## 2017-12-05 RX ORDER — POTASSIUM CHLORIDE 20 MEQ
10 PACKET (EA) ORAL ONCE
Qty: 0 | Refills: 0 | Status: COMPLETED | OUTPATIENT
Start: 2017-12-05 | End: 2017-12-05

## 2017-12-05 RX ORDER — HEPARIN SODIUM 5000 [USP'U]/ML
1000 INJECTION INTRAVENOUS; SUBCUTANEOUS
Qty: 25000 | Refills: 0 | Status: DISCONTINUED | OUTPATIENT
Start: 2017-12-05 | End: 2017-12-05

## 2017-12-05 RX ADMIN — PANTOPRAZOLE SODIUM 40 MILLIGRAM(S): 20 TABLET, DELAYED RELEASE ORAL at 10:31

## 2017-12-05 RX ADMIN — Medication 81 MILLIGRAM(S): at 12:05

## 2017-12-05 RX ADMIN — ENOXAPARIN SODIUM 80 MILLIGRAM(S): 100 INJECTION SUBCUTANEOUS at 13:25

## 2017-12-05 RX ADMIN — HYDROXYUREA 500 MILLIGRAM(S): 500 CAPSULE ORAL at 12:05

## 2017-12-05 RX ADMIN — Medication 100 MILLIGRAM(S): at 12:05

## 2017-12-05 RX ADMIN — HEPARIN SODIUM 800 UNIT(S)/HR: 5000 INJECTION INTRAVENOUS; SUBCUTANEOUS at 01:38

## 2017-12-05 RX ADMIN — Medication 10 MILLIEQUIVALENT(S): at 10:38

## 2017-12-05 NOTE — PROGRESS NOTE ADULT - SUBJECTIVE AND OBJECTIVE BOX
Interval Events: reviewed  Patient seen and examined at bedside.    Patient is a 89y old  Male who presents with a chief complaint of Shortness of breath (04 Dec 2017 16:29)    he is doing better that yesterday and breathing is easier  PAST MEDICAL & SURGICAL HISTORY:  Foot drop, left  CAD (coronary artery disease)  GERD (gastroesophageal reflux disease)  Aneurysm  History of cholecystectomy  H/O heart artery stent: x 2  History of back surgery: x 4      MEDICATIONS:  Pulmonary:    Antimicrobials:    Anticoagulants:  aspirin  chewable 81 milliGRAM(s) Oral daily  enoxaparin Injectable 80 milliGRAM(s) SubCutaneous two times a day    Cardiac:      Allergies    No Known Allergies    Intolerances        Vital Signs Last 24 Hrs  T(C): 37.2 (05 Dec 2017 17:00), Max: 37.2 (05 Dec 2017 17:00)  T(F): 99 (05 Dec 2017 17:00), Max: 99 (05 Dec 2017 17:00)  HR: 68 (05 Dec 2017 22:09) (62 - 70)  BP: 153/74 (05 Dec 2017 22:09) (110/56 - 154/70)  BP(mean): 106 (05 Dec 2017 22:09) (80 - 106)  RR: 22 (05 Dec 2017 22:09) (18 - 25)  SpO2: 99% (05 Dec 2017 22:09) (96% - 100%)    12-04 @ 07:01  -  12-05 @ 07:00  --------------------------------------------------------  IN: 30 mL / OUT: 1450 mL / NET: -1420 mL    12-05 @ 07:01  -  12-05 @ 22:23  --------------------------------------------------------  IN: 30 mL / OUT: 0 mL / NET: 30 mL          LABS:  ABG - ( 04 Dec 2017 12:26 )  pH: 7.45  /  pCO2: 41    /  pO2: 116   / HCO3: 28    / Base Excess: 3.5   /  SaO2: 99                  CBC Full  -  ( 05 Dec 2017 06:59 )  WBC Count : 4.3 K/uL  Hemoglobin : 8.9 g/dL  Hematocrit : 27.9 %  Platelet Count - Automated : 485 K/uL  Mean Cell Volume : 102.2 fL  Mean Cell Hemoglobin : 32.6 pg  Mean Cell Hemoglobin Concentration : 31.9 g/dL  Auto Neutrophil # : x  Auto Lymphocyte # : x  Auto Monocyte # : x  Auto Eosinophil # : x  Auto Basophil # : x  Auto Neutrophil % : x  Auto Lymphocyte % : x  Auto Monocyte % : x  Auto Eosinophil % : x  Auto Basophil % : x    12-05    142  |  99  |  20  ----------------------------<  95  3.9   |  28  |  1.21    Ca    8.2<L>      05 Dec 2017 06:59  Phos  4.3     12-05  Mg     2.0     12-05    TPro  6.4  /  Alb  2.9<L>  /  TBili  0.6  /  DBili  x   /  AST  30  /  ALT  13  /  AlkPhos  87  12-04    PT/INR - ( 04 Dec 2017 10:30 )   PT: 13.1 sec;   INR: 1.18          PTT - ( 05 Dec 2017 11:14 )  PTT:40.9 sec        < from: Echocardiogram (12.05.17 @ 11:52) >    EXAM:  ECHOCARDIOGRAM (CARDIOL)                          PROCEDURE DATE:  12/05/2017                        INTERPRETATION:  Patient Height: 175.0 cm  Patient Weight: 80.0 kg  Heart Rate: 72 bpm  Systolic Pressure: 117 mmHg  Diastolic Pressure: 59 mmHg  BSA: 2.0 m^2  Interpretation Summary  There is mild concentric left ventricular hypertrophy.Abnormal   (paradoxical)   septal motion consistent with elevated RV end-diastolic pressure. The   other   walls contract normally. The left ventricular ejection fraction is   normal. The   left ventricular ejection fraction is estimated to be 55-60%  The right   ventricle is severely dilated. The right ventricular systolic function is   moderately reduced.  The left atrium is mildly dilated. The leftatrial   volume   index is 36 cc/m2 (normal <34cc/m2)  The mitral inflow pattern is   consistent   with impaired left ventricular relaxation with mildly elevated left   atrial   pressure (8-14mmHg).  Right atrial size is normal. Calcified aortic   valve.   There is mild aortic regurgitation. There is Moderate aortic stenosis.    The   peak pressure gradient is 30 mmHg. The mean pressure gradient is 17   mmHg. The   calculated aortic valve area using the continuity equation is 1.4 cm2.   Structurally normal mitral valve. There is mild to moderate mitral   regurgitation.  Structurally normal tricuspid valve. There is mild   tricuspid   regurgitation. There is severe pulmonary hypertension. The pulmonary   artery   systolic pressure is estimatedto be 83 mmHg.  Structurally normal   pulmonic   valve. No pulmonic regurgitation noted.A small pericardial effusion   noted.Compared to the echo performed on 11/8/17, the RV is now dilated   with   decreased function. Severe pulmonary HTN.    < end of copied text >              RADIOLOGY & ADDITIONAL STUDIES (The following images were personally reviewed):  Riley:                         No  Urine output:               Yes        DVT prophylaxis:         Yes          Flattus:                          Yes          Bowel movement:       Yes

## 2017-12-05 NOTE — PROGRESS NOTE ADULT - SUBJECTIVE AND OBJECTIVE BOX
INTERVAL HPI/OVERNIGHT EVENTS:    ROS  CV: Denies chest pain, palpitations  RESP: Denies SOB  GI: Denies abdominal pain, constipation, diarrhea, nausea, vomiting  : Denies dysuria, hematuria, flank or back pain  ID: Denies fevers, chills  MSK: Denies joint pain   DERM: Denies any rashes, bruising, pruritis  NEURO: No headaches, blurry vision, double vision  ENDO: Denies heat or cold intolerances, changes in weight, thinning of hair  PSYCH: Denies any mood changes    VITAL SIGNS:  T(F): 97.1 (12-05-17 @ 05:46), Max: 98.6 (12-04-17 @ 22:39)  HR: 66 (12-05-17 @ 05:55) (62 - 101)  BP: 131/63 (12-05-17 @ 05:16) (110/73 - 153/79)  BP(mean): 88 (12-05-17 @ 05:16) (88 - 108)  RR: 20 (12-05-17 @ 05:16) (18 - 27)  SpO2: 98% (12-05-17 @ 05:55) (90% - 100%)    12-04-17 @ 07:01  -  12-05-17 @ 06:20  --------------------------------------------------------  IN: 30 mL / OUT: 1450 mL / NET: -1420 mL    PHYSICAL EXAM:  Constitutional: WDWN, NAD, resting comfortably   	Head: NC/AT  	Eyes: PERRL, EOMI, anicteric sclera, no mucosal pallor  	ENT: no nasal discharge; uvula midline, no oropharyngeal erythema or exudates; MMM  	Neck: supple; JVD present, no thyromegaly, no lymphadenopathy  	Respiratory: CTA B/L; no W/R/R, no retractions  	Cardiac: +S1/S2; RRR; no M/R/G; PMI non-displaced  	Gastrointestinal: pulsatile aorta felt on deep palpation, abdomen soft, NT/ND; no rebound or guarding; +BSx4  	Back: spine midline, no bony tenderness or step-offs; no CVAT B/L  	Extremities: warm; no calf tenderness, no pedal edema, no cyanosis, no clubbing  	Musculoskeletal: NROM x4; no joint swelling, tenderness or erythema  	Vascular: 2+ radial; DP/PT pulses B/L  	Dermatologic: no rash, no petechia   	Lymphatic: no submandibular or cervical LAD  	Neurologic: AAOx3; CNII-XII grossly intact; 5/5 strength throughout, sensory symmetrically intact in B/L LE   Psychiatric: pleasant and conversive; affect and characteristics of appearance, verbalizations, behaviors are appropriate      MEDICATIONS  (STANDING):  aspirin  chewable 81 milliGRAM(s) Oral daily  docusate sodium 100 milliGRAM(s) Oral daily  heparin  Infusion 1000 Unit(s)/Hr (10 mL/Hr) IV Continuous <Continuous>  hydroxyurea 500 milliGRAM(s) Oral every 12 hours  pantoprazole    Tablet 40 milliGRAM(s) Oral before breakfast    MEDICATIONS  (PRN):  temazepam 15 milliGRAM(s) Oral at bedtime PRN Insomnia      Allergies    No Known Allergies    Intolerances        LABS:                        9.2    5.5   )-----------( 467      ( 04 Dec 2017 10:30 )             28.3     12-04    141  |  100  |  20  ----------------------------<  150<H>  4.2   |  26  |  1.24    Ca    8.2<L>      04 Dec 2017 10:30    TPro  6.4  /  Alb  2.9<L>  /  TBili  0.6  /  DBili  x   /  AST  30  /  ALT  13  /  AlkPhos  87  12-04    PT/INR - ( 04 Dec 2017 10:30 )   PT: 13.1 sec;   INR: 1.18          PTT - ( 05 Dec 2017 03:55 )  PTT:39.5 sec      RADIOLOGY & ADDITIONAL TESTS: INTERVAL HPI/OVERNIGHT EVENTS: No acute events overnight. Pt weaned to HFNC today and tolerating it well.    ROS  CV: Denies chest pain, palpitations  RESP: Denies SOB  GI: Denies abdominal pain, constipation, diarrhea, nausea, vomiting  : Denies dysuria, hematuria, flank or back pain  ID: Denies fevers, chills  MSK: Denies joint pain   DERM: Denies any rashes, bruising, pruritis  NEURO: No headaches, blurry vision, double vision  ENDO: Denies heat or cold intolerances, changes in weight, thinning of hair  PSYCH: Denies any mood changes    VITAL SIGNS:  T(F): 97.1 (12-05-17 @ 05:46), Max: 98.6 (12-04-17 @ 22:39)  HR: 66 (12-05-17 @ 05:55) (62 - 101)  BP: 131/63 (12-05-17 @ 05:16) (110/73 - 153/79)  BP(mean): 88 (12-05-17 @ 05:16) (88 - 108)  RR: 20 (12-05-17 @ 05:16) (18 - 27)  SpO2: 98% (12-05-17 @ 05:55) (90% - 100%)    12-04-17 @ 07:01  -  12-05-17 @ 06:20  --------------------------------------------------------  IN: 30 mL / OUT: 1450 mL / NET: -1420 mL    PHYSICAL EXAM:  Constitutional: WDWN, NAD, resting comfortably   	Head: NC/AT  	Eyes: PERRL, EOMI, anicteric sclera, no mucosal pallor  	ENT: no nasal discharge; uvula midline, no oropharyngeal erythema or exudates; MMM  	Neck: supple; JVD present, no thyromegaly, no lymphadenopathy  	Respiratory: CTA B/L; no W/R/R, no retractions  	Cardiac: +S1/S2; RRR; no M/R/G; PMI non-displaced  	Gastrointestinal: pulsatile aorta felt on deep palpation, abdomen soft, NT/ND; no rebound or guarding; +BSx4  	Back: spine midline, no bony tenderness or step-offs; no CVAT B/L  	Extremities: warm; no calf tenderness, no pedal edema, no cyanosis, no clubbing  	Musculoskeletal: NROM x4; no joint swelling, tenderness or erythema  	Vascular: 2+ radial; DP/PT pulses B/L  	Dermatologic: no rash, no petechia   	Lymphatic: no submandibular or cervical LAD  	Neurologic: AAOx3; CNII-XII grossly intact; 5/5 strength throughout, sensory symmetrically intact in B/L LE   Psychiatric: pleasant and conversive; affect and characteristics of appearance, verbalizations, behaviors are appropriate      MEDICATIONS  (STANDING):  aspirin  chewable 81 milliGRAM(s) Oral daily  docusate sodium 100 milliGRAM(s) Oral daily  heparin  Infusion 1000 Unit(s)/Hr (10 mL/Hr) IV Continuous <Continuous>  hydroxyurea 500 milliGRAM(s) Oral every 12 hours  pantoprazole    Tablet 40 milliGRAM(s) Oral before breakfast    MEDICATIONS  (PRN):  temazepam 15 milliGRAM(s) Oral at bedtime PRN Insomnia      Allergies    No Known Allergies    Intolerances        LABS:                         8.9    4.3   )-----------( 485      ( 05 Dec 2017 06:59 )             27.9                        9.2    5.5   )-----------( 467      ( 04 Dec 2017 10:30 )             28.3   12-05    142  |  99  |  20  ----------------------------<  95  3.9   |  28  |  1.21    Ca    8.2<L>      05 Dec 2017 06:59  Phos  4.3     12-05  Mg     2.0     12-05    TPro  6.4  /  Alb  2.9<L>  /  TBili  0.6  /  DBili  x   /  AST  30  /  ALT  13  /  AlkPhos  87  12-04    12-04    141  |  100  |  20  ----------------------------<  150<H>  4.2   |  26  |  1.24    Ca    8.2<L>      04 Dec 2017 10:30    TPro  6.4  /  Alb  2.9<L>  /  TBili  0.6  /  DBili  x   /  AST  30  /  ALT  13  /  AlkPhos  87  12-04    PT/INR - ( 04 Dec 2017 10:30 )   PT: 13.1 sec;   INR: 1.18          PTT - ( 05 Dec 2017 03:55 )  PTT:39.5 sec      RADIOLOGY & ADDITIONAL TESTS:

## 2017-12-05 NOTE — PROGRESS NOTE ADULT - ASSESSMENT
submassive PE, recent EVAR for AAA    cont AC  no catheter-direct thrombolysis indicated per Dr. Jacob  will discuss with Dr. White regarding when to remove staples in groins 89 yo M with submassive PE and recent EVAR (11/16) for AAA.    Recs:  continue AC  no catheter-direct thrombolysis indicated per Dr. Jacob  please call vascular team ([phone]) to remove staples on day of discharge  discussed with Chief resident

## 2017-12-05 NOTE — PROGRESS NOTE ADULT - PROBLEM SELECTOR PLAN 2
Evidence of right heart strain on CT PE and elevated troponin (0.09) and BNP (~17255). EKG with sinus rhythm at 88, ST wave inversion in anterior lateral leads (II, III, aVF, V3-V5). Pt relatively hypotensive compared to prior admission   -Monitor morning EKG and trend trops

## 2017-12-05 NOTE — PROGRESS NOTE ADULT - PROBLEM SELECTOR PLAN 1
He is to continue on anticoagulation.  He improved and n HFNC and to continue on weaning the oxygen.  Change to Lovenox and DC heparin.  The duration of anticoagulation in at least 6 month for submassive PE that is provoked

## 2017-12-05 NOTE — PROGRESS NOTE ADULT - SUBJECTIVE AND OBJECTIVE BOX
Subjective: Breathing is improving. No CP, no abd pain, no back pain, no flank pain. No groin pain around incisions. No fevers.        Vital Signs Last 24 Hrs  T(C): 37.5 (06 Dec 2017 02:08), Max: 37.5 (06 Dec 2017 02:08)  T(F): 99.5 (06 Dec 2017 02:08), Max: 99.5 (06 Dec 2017 02:08)  HR: 62 (06 Dec 2017 04:11) (62 - 70)  BP: 123/58 (06 Dec 2017 04:11) (110/56 - 154/70)  BP(mean): 83 (06 Dec 2017 04:11) (80 - 106)  RR: 20 (06 Dec 2017 04:11) (18 - 25)  SpO2: 97% (06 Dec 2017 04:11) (96% - 100%)    I&O's Summary    04 Dec 2017 07:01  -  05 Dec 2017 07:00  --------------------------------------------------------  IN: 30 mL / OUT: 1450 mL / NET: -1420 mL    05 Dec 2017 07:01  -  06 Dec 2017 05:00  --------------------------------------------------------  IN: 30 mL / OUT: 0 mL / NET: 30 mL        Physical Exam:  General: NAD, resting comfortably  Pulmonary: normal resp effort, on HFNC  Cardiovascular: NSR  Abdominal: soft, NT/ND, no pulsatile mass  Extremities: WWP, normal strength, no clubbing/cyanosis/edema. Bilateral groin incisions c/d/i, minimal ecchymosis, no erythema/warmth/tenderness. Staples in place, but skin edges not fully apposed.  Pulses:   Right:                                                                  FEM [ ]2+ [ ]1+ [ ]doppler  POP [ ]2+ [ ]1+ [ ]doppler  DP [ x]2+ [ ]1+ [ ]doppler  PT[ x]2+ [ ]1+ [ ]doppler    Left:  FEM [ ]2+ [ ]1+ [ ]doppler    POP [ ]2+ [ ]1+ [ ]doppler   DP [ x]2+ [ ]1+ [ ]doppler  PT [x ]2+ [ ]1+ [ ]doppler    Lines/drains/tubes:    LABS:                        8.9    4.3   )-----------( 485      ( 05 Dec 2017 06:59 )             27.9     12-05    142  |  99  |  20  ----------------------------<  95  3.9   |  28  |  1.21    Ca    8.2<L>      05 Dec 2017 06:59  Phos  4.3     12-05  Mg     2.0     12-05    TPro  6.4  /  Alb  2.9<L>  /  TBili  0.6  /  DBili  x   /  AST  30  /  ALT  13  /  AlkPhos  87  12-04    PT/INR - ( 04 Dec 2017 10:30 )   PT: 13.1 sec;   INR: 1.18          PTT - ( 05 Dec 2017 11:14 )  PTT:40.9 sec    LIVER FUNCTIONS - ( 04 Dec 2017 10:30 )  Alb: 2.9 g/dL / Pro: 6.4 g/dL / ALK PHOS: 87 U/L / ALT: 13 U/L / AST: 30 U/L / GGT: x

## 2017-12-05 NOTE — PROGRESS NOTE ADULT - PROBLEM SELECTOR PLAN 1
Pulmonary emboli are seen within lobar, segmental and subsegmental branches of each pulmonary lobe bilaterally, with evidence of right heart strain on CT PE and elevated troponin (0.09) and BNP (~41841). 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 Pt currently mentating well, HD stable, s/p heparin bolus, now on heparin gtt  - can switch from BiPaP to HFNC  - c/w heparin gtt, check PTT q4-6hrs, next PTT 8:00 PM  - f/u ECHO and LE venous duplex b/l  - Trend trops to peak  - Holding home 10 mg Hydralazine at this point (per HHA, pt no longer taking it)  - Maintain active T&S, monitor CBC as pt with high bleeding risk given recent GI bleed and EVAR Pulmonary emboli are seen within lobar, segmental and subsegmental branches of each pulmonary lobe bilaterally, with evidence of right heart strain on CT PE and elevated troponin (0.09) and BNP (~83521). 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 Pt currently mentating well, HD stable  -ECHO with severe RV dilation and severe pulmonary HTN (83 mmHq).   -Heparin gtt swicthed to 80 mg lovenox BID  - can switch from BiPaP to HFNC  - c/w heparin gtt, check PTT q4-6hrs, next PTT 8:00 PM  - f/u LE venous duplex b/l  - Trend trops to peak  - Holding home 10 mg Hydralazine at this point (per HHA, pt no longer taking it)  - Maintain active T&S, monitor CBC as pt with high bleeding risk given recent GI bleed and EVAR Pulmonary emboli are seen within lobar, segmental and subsegmental branches of each pulmonary lobe bilaterally, with evidence of right heart strain on CT PE and elevated troponin (0.09) and BNP (~11006). 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 Pt currently mentating well, HD stable  -ECHO with severe RV dilation and severe pulmonary HTN (83 mmHq).   -Heparin gtt switched to 80 mg Lovenox BID  - Weaned of BIPAP to HFNC, will wean as tolerated   - c/w heparin gtt, check PTT q4-6hrs, next PTT 8:00 PM  - f/u LE venous duplex b/l  - Trops peaked at 0.10  - Holding home 10 mg Hydralazine at this point (per HHA, pt no longer taking it)  - Maintain active T&S, monitor CBC as pt with high bleeding risk given recent GI bleed and EVAR

## 2017-12-06 DIAGNOSIS — R63.8 OTHER SYMPTOMS AND SIGNS CONCERNING FOOD AND FLUID INTAKE: ICD-10-CM

## 2017-12-06 LAB
ANION GAP SERPL CALC-SCNC: 10 MMOL/L — SIGNIFICANT CHANGE UP (ref 5–17)
APTT BLD: 33.2 SEC — SIGNIFICANT CHANGE UP (ref 27.5–37.4)
BUN SERPL-MCNC: 23 MG/DL — SIGNIFICANT CHANGE UP (ref 7–23)
CALCIUM SERPL-MCNC: 8.3 MG/DL — LOW (ref 8.4–10.5)
CHLORIDE SERPL-SCNC: 100 MMOL/L — SIGNIFICANT CHANGE UP (ref 96–108)
CO2 SERPL-SCNC: 30 MMOL/L — SIGNIFICANT CHANGE UP (ref 22–31)
CREAT SERPL-MCNC: 1.12 MG/DL — SIGNIFICANT CHANGE UP (ref 0.5–1.3)
GLUCOSE SERPL-MCNC: 111 MG/DL — HIGH (ref 70–99)
HCT VFR BLD CALC: 27.8 % — LOW (ref 39–50)
HGB BLD-MCNC: 8.6 G/DL — LOW (ref 13–17)
MAGNESIUM SERPL-MCNC: 2 MG/DL — SIGNIFICANT CHANGE UP (ref 1.6–2.6)
MCHC RBC-ENTMCNC: 30.9 G/DL — LOW (ref 32–36)
MCHC RBC-ENTMCNC: 32.5 PG — SIGNIFICANT CHANGE UP (ref 27–34)
MCV RBC AUTO: 104.9 FL — HIGH (ref 80–100)
PHOSPHATE SERPL-MCNC: 3.3 MG/DL — SIGNIFICANT CHANGE UP (ref 2.5–4.5)
PLATELET # BLD AUTO: 485 K/UL — HIGH (ref 150–400)
POTASSIUM SERPL-MCNC: 4 MMOL/L — SIGNIFICANT CHANGE UP (ref 3.5–5.3)
POTASSIUM SERPL-SCNC: 4 MMOL/L — SIGNIFICANT CHANGE UP (ref 3.5–5.3)
RBC # BLD: 2.65 M/UL — LOW (ref 4.2–5.8)
RBC # FLD: 21.3 % — HIGH (ref 10.3–16.9)
SODIUM SERPL-SCNC: 140 MMOL/L — SIGNIFICANT CHANGE UP (ref 135–145)
WBC # BLD: 5.1 K/UL — SIGNIFICANT CHANGE UP (ref 3.8–10.5)
WBC # FLD AUTO: 5.1 K/UL — SIGNIFICANT CHANGE UP (ref 3.8–10.5)

## 2017-12-06 PROCEDURE — 99232 SBSQ HOSP IP/OBS MODERATE 35: CPT | Mod: GC

## 2017-12-06 RX ORDER — APIXABAN 2.5 MG/1
10 TABLET, FILM COATED ORAL EVERY 12 HOURS
Qty: 0 | Refills: 0 | Status: DISCONTINUED | OUTPATIENT
Start: 2017-12-06 | End: 2017-12-07

## 2017-12-06 RX ORDER — POLYETHYLENE GLYCOL 3350 17 G/17G
17 POWDER, FOR SOLUTION ORAL ONCE
Qty: 0 | Refills: 0 | Status: COMPLETED | OUTPATIENT
Start: 2017-12-06 | End: 2017-12-06

## 2017-12-06 RX ORDER — APIXABAN 2.5 MG/1
5 TABLET, FILM COATED ORAL EVERY 12 HOURS
Qty: 0 | Refills: 0 | Status: CANCELLED | OUTPATIENT
Start: 2017-12-13 | End: 2017-12-07

## 2017-12-06 RX ORDER — SENNA PLUS 8.6 MG/1
1 TABLET ORAL ONCE
Qty: 0 | Refills: 0 | Status: COMPLETED | OUTPATIENT
Start: 2017-12-06 | End: 2017-12-06

## 2017-12-06 RX ORDER — APIXABAN 2.5 MG/1
2.5 TABLET, FILM COATED ORAL EVERY 12 HOURS
Qty: 0 | Refills: 0 | Status: DISCONTINUED | OUTPATIENT
Start: 2018-02-13 | End: 2017-12-06

## 2017-12-06 RX ADMIN — ENOXAPARIN SODIUM 80 MILLIGRAM(S): 100 INJECTION SUBCUTANEOUS at 01:00

## 2017-12-06 RX ADMIN — Medication 81 MILLIGRAM(S): at 12:11

## 2017-12-06 RX ADMIN — PANTOPRAZOLE SODIUM 40 MILLIGRAM(S): 20 TABLET, DELAYED RELEASE ORAL at 06:46

## 2017-12-06 RX ADMIN — APIXABAN 10 MILLIGRAM(S): 2.5 TABLET, FILM COATED ORAL at 23:27

## 2017-12-06 RX ADMIN — Medication 100 MILLIGRAM(S): at 12:11

## 2017-12-06 RX ADMIN — HYDROXYUREA 500 MILLIGRAM(S): 500 CAPSULE ORAL at 12:11

## 2017-12-06 RX ADMIN — HYDROXYUREA 500 MILLIGRAM(S): 500 CAPSULE ORAL at 23:27

## 2017-12-06 RX ADMIN — HYDROXYUREA 500 MILLIGRAM(S): 500 CAPSULE ORAL at 00:00

## 2017-12-06 RX ADMIN — POLYETHYLENE GLYCOL 3350 17 GRAM(S): 17 POWDER, FOR SOLUTION ORAL at 23:27

## 2017-12-06 RX ADMIN — SENNA PLUS 1 TABLET(S): 8.6 TABLET ORAL at 23:27

## 2017-12-06 RX ADMIN — ENOXAPARIN SODIUM 80 MILLIGRAM(S): 100 INJECTION SUBCUTANEOUS at 12:11

## 2017-12-06 NOTE — PROGRESS NOTE ADULT - PROBLEM SELECTOR PLAN 1
Pulmonary emboli are seen within lobar, segmental and subsegmental branches of each pulmonary lobe bilaterally, with evidence of right heart strain on CT PE and elevated troponin (0.09) and BNP (~78705). 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 Pt currently mentating well, HD stable  -ECHO with severe RV dilation and severe pulmonary HTN (83 mmHq).   -Heparin gtt switched to 80 mg Lovenox BID  - Weaned of BIPAP to HFNC, will wean as tolerated   - c/w heparin gtt, check PTT q4-6hrs, next PTT 8:00 PM  - f/u LE venous duplex b/l  - Trops peaked at 0.10  - Holding home 10 mg Hydralazine at this point (per HHA, pt no longer taking it)  - Maintain active T&S, monitor CBC as pt with high bleeding risk given recent GI bleed and EVAR Pulmonary emboli are seen within lobar, segmental and subsegmental branches of each pulmonary lobe bilaterally, with evidence of right heart strain on CT PE and elevated troponin (0.09) and BNP (~54957). 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 Pt currently mentating well, HD stable  -ECHO with severe RV dilation and severe pulmonary HTN (83 mmHq).   -c/w 80 mg Lovenox BID  - Weaned of BIPAP to HFNC, will wean as tolerated   - c/w heparin gtt, check PTT q4-6hrs, next PTT 8:00 PM  - f/u LE venous duplex revealed bilateral deep femoral veins DVT. Midportion of the left femoral vein DVT. Left popliteal vein DVT.  - Trops peaked at 0.10  - Holding home 10 mg Hydralazine at this point (per HHA, pt no longer taking it)  - Maintain active T&S, monitor CBC as pt with high bleeding risk given recent GI bleed and EVAR Pulmonary emboli are seen within lobar, segmental and subsegmental branches of each pulmonary lobe bilaterally, with evidence of right heart strain on CT PE and elevated troponin (0.09) and BNP (~25582). 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 Pt currently mentating well, HD stable  -ECHO with severe RV dilation and severe pulmonary HTN (83 mmHq).   -Lovenox switched to eliquis today  - Weaned of BIPAP to HFNC, will wean as tolerated   - f/u LE venous duplex revealed bilateral deep femoral veins DVT. Midportion of the left femoral vein DVT. Left popliteal vein DVT.  - Trops peaked at 0.10  - Holding home 10 mg Hydralazine at this point (per HHA, pt no longer taking it)  - Maintain active T&S, monitor CBC as pt with high bleeding risk given recent GI bleed and EVAR

## 2017-12-06 NOTE — PHYSICAL THERAPY INITIAL EVALUATION ADULT - PERTINENT HX OF CURRENT PROBLEM, REHAB EVAL
Patient is a 89 year old M who presents with generalized weakness and persistent abdominal pain since discharge from prior hospitalization. He was previously admitted on 11/7 with melena and anemia. Found to bilateral PEs and a small endoleak from a abdominal aortic aneurysm. Relevant PMH includesHTN, HLD, CAD s/p stent (>10 years ago), AS, AAA, and essential thrombocytosis, and an asymptomatic 5.6 cm AAA s/p endovascular repair on 11/16

## 2017-12-06 NOTE — CONSULT NOTE ADULT - SUBJECTIVE AND OBJECTIVE BOX
Patient is a 89y old  Male who presents with a chief complaint of Shortness of breath (04 Dec 2017 16:29)       HPI:  89 yo M with h/o HTN, HLD, CAD s/p stent (>10 years ago), AS, AAA, and essential thrombocytosis, and an asymptomatic 5.6 cm AAA s/p endovascular repair on 11/16; now presenting with generalized weakness and persistent abdominal pain since discharge from prior hospitalization. He was previllously admitted on 11/7 with melena and anemia (hgb 6), received 2 units pRBC and Hgb stabilized above 8.  Upper Endoscopy at the time showed gastritis and hiatal hernia, no source of bleeding was noted. Colonoscopy was performed and was unremarkable. On CT, pt was noted to have 2 mm progression of AAA (5.4cm) and was evaluated by Vascular Surgery for EVAR. GI cleared pt for vascular procedure.  Pt was seen by Dr Hsu, Cardiology, for cardiac clearance.  An echo was performed that showed progression of AS from mild to moderate, small pericardial effusion, EF 55-60%.    Pt states that since his last discharge, he has been experiencing generalized weakness and abdominal pain. The pain is 2/10, achy, non radiating, with no alleviating or exacerbating factors. He has had episodes of vomiting, non bilious, non bloody. Per home aid, PT noticed him desatting to low 90s while working with him at home. ROS otherwise negative. Per HHA, pt has been more SOB with labored breathing when he walks. Patient himself denies any chest pain, palpitations, dizziness, LE pain or swelling. Denies personal or family history of blood clots.   In ED, T 97.4, , /73, RR 20, SpO2 90% on RA. CBC with macrocytic anemia (H/H 9.2/28.3), CMP unremarkable, Cardiac enzymes with Trop I 0.90, pro-BNP 18371.  ABG wnl. CTA with innumerable pulmonary emboli, right heart strain; small endoleak; mild cardiomegaly; mall pericardial effusion. Pt was placed on BIPAP for desatting to 80s. (04 Dec 2017 16:29)      PAST MEDICAL & SURGICAL HISTORY:  Foot drop, left  CAD (coronary artery disease)  GERD (gastroesophageal reflux disease)  Aneurysm  History of cholecystectomy  H/O heart artery stent: x 2  History of back surgery: x 4      MEDICATIONS  (STANDING):  aspirin  chewable 81 milliGRAM(s) Oral daily  docusate sodium 100 milliGRAM(s) Oral daily  enoxaparin Injectable 80 milliGRAM(s) SubCutaneous two times a day  hydroxyurea 500 milliGRAM(s) Oral every 12 hours  pantoprazole    Tablet 40 milliGRAM(s) Oral before breakfast    MEDICATIONS  (PRN):  temazepam 15 milliGRAM(s) Oral at bedtime PRN Insomnia      Social History: lives alone on 1st floor apartment, has 24 hr x 7 day private hire aides (3)    Functional Level Prior to Admission: partial assist with bathing/dressing, walks with supervision of aide, has a walker    FAMILY HISTORY:  No pertinent family history      CBC Full  -  ( 06 Dec 2017 07:12 )  WBC Count : 5.1 K/uL  Hemoglobin : 8.6 g/dL  Hematocrit : 27.8 %  Platelet Count - Automated : 485 K/uL  Mean Cell Volume : 104.9 fL  Mean Cell Hemoglobin : 32.5 pg  Mean Cell Hemoglobin Concentration : 30.9 g/dL  Auto Neutrophil # : x  Auto Lymphocyte # : x  Auto Monocyte # : x  Auto Eosinophil # : x  Auto Basophil # : x  Auto Neutrophil % : x  Auto Lymphocyte % : x  Auto Monocyte % : x  Auto Eosinophil % : x  Auto Basophil % : x      12-06    140  |  100  |  23  ----------------------------<  111<H>  4.0   |  30  |  1.12    Ca    8.3<L>      06 Dec 2017 07:12  Phos  3.3     12-06  Mg     2.0     12-06    TPro  6.4  /  Alb  2.9<L>  /  TBili  0.6  /  DBili  x   /  AST  30  /  ALT  13  /  AlkPhos  87  12-04            Radiology:    < from: Xray Chest 1 View AP/PA (12.04.17 @ 10:53) >  EXAM:  XR CHEST-FRONTAL                          PROCEDURE DATE:  12/04/2017                     INTERPRETATION:  Portable Chest X-Ray dated 12/4/2017 10:53 AM    Indication: sob    An AP portable view of the chest is compared to 11/16/2017. Elevated   right hemidiaphragm. Mild pulmonary venous congestion. Trace pleural   effusions are suspected.    IMPRESSION:  Mild pulmonary venous congestion. Elevated right hemidiaphragm.      < end of copied text >      < from: CT Angio Chest PE Protocol w/ IV Cont (12.04.17 @ 14:23) >  EXAM:  CT ANGIO ABD PELV (W)AW IC                          EXAM:  CT ANGIO CHEST PE PROTOCOL IC                          PROCEDURE DATE:  12/04/2017     125  [address] 350   5           INTERPRETATION:  CTA (CT angiography) of the CHEST ABDOMEN AND PELVIS   dated 12/4/2017 2:23 PM    INDICATION: Shortness of breath, hypoxia and history of endovascular AAA   repair. Evaluate for pulmonary embolism and/or worsening aneurysm.    TECHNIQUE: CTA (CT ANGIOGRAPHY) of the chest, abdomen, and pelvis was   performed. Multiplanar images of the chest, abdomen, and pelvis were   reconstructed on an independent work-station. Post-processing including   the production of axial, coronal and sagittal multiplanar reformatted   images and axial, coronal and sagittal maximum intensity projections   (MIPs) was performed.    COMPARISON: CT abdomen and pelvis 11/7/2017. CT abdomen and pelvis   9/20/2016. CTA chest 1/6/2014.    FINDINGS: Pulmonary emboli are seen within lobar, segmental and   subsegmental branches of each pulmonary lobe bilaterally. The main   pulmonary trunk is dilated measuring up to 4.5 cm consistent with   pulmonary hypertension. Leftward septal bowing and mild contrast reflux   into the IVC is consistent with right heart strain.    There is stable aneurysmal dilatation of the thoracic aorta measuring up   to 4.5 x 4.0 cm (AP x transverse) at the sinuses of Valsalva and 4.0 x   4.1 cm (AP x transverse) at the mid ascending thoracic aorta. Evaluation   of the abdominal aorta demonstrates patient is status post aortobiiliac   stent grafting of a saccular infrarenal abdominal aortic aneurysm. This   aneurysm currently measures 5.1 x 5.4 cm (AP x transverse) with an   involved length of 5.2 cm without significant change since 11/7/2017 at   which time it measured 5.2 x 5.4 cm. Faint contrast enhancement within   the excluded lumen of the aneurysm likely represents an endoleak (image   98, series 7). 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 Severe calcified and noncalcified plaque is seen   throughout the aorta and its great branches.    The heart is mildly enlarged. There is a small pericardial effusion,   increased compared to 11/7/2017. Dense coronary and aortic valvular   calcifications are noted. No mediastinal, hilar or axillary   lymphadenopathy is seen.    Evaluation of the pulmonary recommend demonstrates trace bilateral   pleural effusions, right greater than left. There is right middle lobe   collapse and subtotal collapse of the basilar segments of the right lower   lobe, likely secondary to the elevation of the right hemidiaphragm.   Subpleural reticular opacities bilaterally again suggest interstitial   lung disease. A 9 mm subpleural left lower lobe nodule is not   significantly changed since 9/23/2016.    Images of the upper abdomen demonstrate no focal hepatic abnormalities.   Patient is status post cholecystectomy. The pancreas is normal in   appearance. Subcapsular calcification spleen is probably from an old   subcapsular hematoma. A nonspecific subcentimeter hypodensities noted in   the spleen.    The adrenal glands are unremarkable. Bilateral renal cysts are noted.   There is a homogeneous 2.5 cm hyperdense lesion in the posterior aspect   of the mid right kidney, likely a hyperdense cyst. There is a 0.9 cm   hyperdense lesion in the anterior mid to lower right kidney, most likely   a hyperdense cyst. There is a 1.5 cm hyperdense lesion at the posterior   lower left kidney, possibly a hyperdense cyst. Indeterminate   subcentimeter renal hypodensities are noted bilaterally. No   lymphadenopathy is seen.    Evaluation of the bowel is limited without oral contrast. Within that   limitation, these images demonstrate colonic diverticulosis. No ascites   is seen.     Images of the pelvis demonstrate postoperative changes and seroma in the   inguinal region bilaterally. Skin staples are still present in the groin   bilaterally. Prostate is mildly enlarged. Seminal vesicles are   unremarkable. Mild wall thickening of the urinary bladder is evident.    Evaluation of the osseous structures demonstrates prior lumbar spinal   fusion. Lucencies are seen around the most inferior screws at L3   suggesting loosening. Severe degenerative changes are seen in the spine.      IMPRESSION:  1.  Innumerable pulmonary emboli. Right heart strain.  2.  Status post abdominal aorta biiliac endoluminal stent graft. Small   endoleak.  3.  Mild cardiomegaly. Small pericardial effusion.  4.  Mild elevation of the right hemidiaphragm causing right lower lobe   collapse and subtotal collapse of the right lower lobe basal segments.  5.  Multiple hyperdense renal lesions.  6.  Looseningof the lumbar fusion screws at L3 without change since   9/23/2016    < end of copied text >      < from: CT Angio Abdomen and Pelvis w/ IV Cont (12.04.17 @ 14:37) >  EXAM:  CT ANGIO ABD PELV (W)AW IC                          EXAM:  CT ANGIO CHEST PE PROTOCOL IC                          PROCEDURE DATE:  12/04/2017     125  [address] 350   5           INTERPRETATION:  CTA (CT angiography) of the CHEST ABDOMEN AND PELVIS   dated 12/4/2017 2:23 PM    INDICATION: Shortness of breath, hypoxia and history of endovascular AAA   repair. Evaluate for pulmonary embolism and/or worsening aneurysm.    TECHNIQUE: CTA (CT ANGIOGRAPHY) of the chest, abdomen, and pelvis was   performed. Multiplanar images of the chest, abdomen, and pelvis were   reconstructed on an independent work-station. Post-processing including   the production of axial, coronal and sagittal multiplanar reformatted   images and axial, coronal and sagittal maximum intensity projections   (MIPs) was performed.    COMPARISON: CT abdomen and pelvis 11/7/2017. CT abdomen and pelvis   9/20/2016. CTA chest 1/6/2014.    FINDINGS: Pulmonary emboli are seen within lobar, segmental and   subsegmental branches of each pulmonary lobe bilaterally. The main   pulmonary trunk is dilated measuring up to 4.5 cm consistent with   pulmonary hypertension. Leftward septal bowing and mild contrast reflux   into the IVC is consistent with right heart strain.    There is stable aneurysmal dilatation of the thoracic aorta measuring up   to 4.5 x 4.0 cm (AP x transverse) at the sinuses of Valsalva and 4.0 x   4.1 cm (AP x transverse) at the mid ascending thoracic aorta. Evaluation   of the abdominal aorta demonstrates patient is status post aortobiiliac   stent grafting of a saccular infrarenal abdominal aortic aneurysm. This   aneurysm currently measures 5.1 x 5.4 cm (AP x transverse) with an   involved length of 5.2 cm without significant change since 11/7/2017 at   which time it measured 5.2 x 5.4 cm. Faint contrast enhancement within   the excluded lumen of the aneurysm likely represents an endoleak (image   98, series 7). 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 Severe calcified and noncalcified plaque is seen   throughout the aorta and its great branches.    The heart is mildly enlarged. There is a small pericardial effusion,   increased compared to 11/7/2017. Dense coronary and aortic valvular   calcifications are noted. No mediastinal, hilar or axillary   lymphadenopathy is seen.    Evaluation of the pulmonary recommend demonstrates trace bilateral   pleural effusions, right greater than left. There is right middle lobe   collapse and subtotal collapse of the basilar segments of the right lower   lobe, likely secondary to the elevation of the right hemidiaphragm.   Subpleural reticular opacities bilaterally again suggest interstitial   lung disease. A 9 mm subpleural left lower lobe nodule is not   significantly changed since 9/23/2016.    Images of the upper abdomen demonstrate no focal hepatic abnormalities.   Patient is status post cholecystectomy. The pancreas is normal in   appearance. Subcapsular calcification spleen is probably from an old   subcapsular hematoma. A nonspecific subcentimeter hypodensities noted in   the spleen.    The adrenal glands are unremarkable. Bilateral renal cysts are noted.   There is a homogeneous 2.5 cm hyperdense lesion in the posterior aspect   of the mid right kidney, likely a hyperdense cyst. There is a 0.9 cm   hyperdense lesion in the anterior mid to lower right kidney, most likely   a hyperdense cyst. There is a 1.5 cm hyperdense lesion at the posterior   lower left kidney, possibly a hyperdense cyst. Indeterminate   subcentimeter renal hypodensities are noted bilaterally. No   lymphadenopathy is seen.    Evaluation of the bowel is limited without oral contrast. Within that   limitation, these images demonstrate colonic diverticulosis. No ascites   is seen.     Images of the pelvis demonstrate postoperative changes and seroma in the   inguinal region bilaterally. Skin staples are still present in the groin   bilaterally. Prostate is mildly enlarged. Seminal vesicles are   unremarkable. Mild wall thickening of the urinary bladder is evident.    Evaluation of the osseous structures demonstrates prior lumbar spinal   fusion. Lucencies are seen around the most inferior screws at L3   suggesting loosening. Severe degenerative changes are seen in the spine.      IMPRESSION:  1.  Innumerable pulmonary emboli. Right heart strain.  2.  Status post abdominal aorta biiliac endoluminal stent graft. Small   endoleak.  3.  Mild cardiomegaly. Small pericardial effusion.  4.  Mild elevation of the right hemidiaphragm causing right lower lobe   collapse and subtotal collapse of the right lower lobe basal segments.  5.  Multiple hyperdense renal lesions.  6.  Looseningof the lumbar fusion screws at L3 without change since   9/23/2016    < end of copied text >                Vital Signs Last 24 Hrs  T(C): 37.3 (06 Dec 2017 09:19), Max: 37.5 (06 Dec 2017 02:08)  T(F): 99.1 (06 Dec 2017 09:19), Max: 99.5 (06 Dec 2017 02:08)  HR: 75 (06 Dec 2017 08:18) (62 - 75)  BP: 123/58 (06 Dec 2017 04:11) (110/55 - 154/70)  BP(mean): 83 (06 Dec 2017 04:11) (78 - 106)  RR: 22 (06 Dec 2017 08:18) (18 - 25)  SpO2: 96% (06 Dec 2017 08:18) (96% - 100%)    REVIEW OF SYSTEMS:    CONSTITUTIONAL: fatigue  EYES: No eye pain, visual disturbances, or discharge  ENMT:  No difficulty hearing, tinnitus, vertigo; No sinus or throat pain  NECK: No pain or stiffness  BREASTS: No pain, masses, or nipple discharge  RESPIRATORY: No cough, wheezing, chills or hemoptysis; No shortness of breath  CARDIOVASCULAR: No chest pain, palpitations, dizziness, or leg swelling  GASTROINTESTINAL: No abdominal or epigastric pain. No nausea, vomiting, or hematemesis; No diarrhea or constipation. No melena or hematochezia.  GENITOURINARY: No dysuria, frequency, hematuria, or incontinence  NEUROLOGICAL: No headaches, memory loss, loss of strength, numbness, or tremors  SKIN: No itching, burning, rashes, or lesions   LYMPH NODES: No enlarged glands  ENDOCRINE: No heat or cold intolerance; No hair loss  MUSCULOSKELETAL: No joint pain or swelling; No muscle, back, or extremity pain  PSYCHIATRIC: No depression, anxiety, mood swings, or difficulty sleeping  HEME/LYMPH: No easy bruising, or bleeding gums  ALLERGY AND IMMUNOLOGIC: No hives or eczema      Physical Exam: WDWN  gentleman lying in bed, "feels tired"    HEENT: normocephalic,  anicteric,  atraumatic    Neck: supple, + JVD, negative carotid bruits,    Chest: CTA bilaterally, neg wheeze, rhonchi, rales, crackles, egophany    Cardiovascular: regular rate and rhythm, neg murmurs/rubs/gallops    Abdomen: soft, non distended, non tender, negative rebound/guarding, normal bowel sounds, neg hepatosplenomegaly    Extremities: WWP, neg cyanosis/clubbing/edema, negative calf tenderness to palpation, negative Oliva's sign, bilateral groin surgical incisions with staples c/d    :     Neurologic Exam:    Alert and oriented to person, place, date/year, speech fluent w/o dysarthria, repetition intact, comprehension intact,     Cranial Nerves:     II:                       pupils equal, round and reactive to light, visual fields intact   III/ IV/VI:             extraocular movements intact, neg nystagmus, ptosis  V:                      facial sensation intact, V1-3 normal  VII:                     face symmetric, no droop, normal eye closure and smile  VIII:                    hearing intact to finger rub bilaterally  IX/ X:                  soft palate rise symmetrical  XI:                      head turning, shoulder shrug normal  XII:                     tongue midline    Motor Exam:    Bilateral UE:         4+/5 /intrinsics                            5/5 biceps/triceps/wrist extensors-flexors/deltoid                            negative pronator drift      Bilateral LE:         4-/5 hip flexors/adductors/abductors                            4+/5 quadriceps/hamstrings                            5/5 dorsiflexors/plantar flexors/invertors-evertors    Sensory: intact to LT/PP in all UE/LE dermatomes    DTR:        = biceps/     triceps/     brachioradialis                 = patella/   medial hamstring/    ankle                 neg clonus                 neg Babinski                 neg Hoffmans    Finger to Nose: wnl    Heel to Shin:      wnl    Rapid Alternating movements:  wnl    Joint Position Sense:  intact    Romberg: NT    Tandem Walking: NT    Gait:  NT        PM&R Impression:    1) deconditioned  2) gait dysfunction  3) s/p PE      Recommendations:    1) Physical therapy focusing on therapeutic exercises, bed mobility/transfer out of bed evaluation, progressive ambulation with assistive devices.    2) Disposition Plan: d/c home with home physical therapy, established private hire aides 24 hr x 7 days

## 2017-12-06 NOTE — PHYSICAL THERAPY INITIAL EVALUATION ADULT - ADDITIONAL COMMENTS
Patient lives alone in an elevator apartment building with 3 steps with unilateral handrails to enter. Prior to admission, patient required assistance for all mobility and ADLs from his home health aide, who is with him 24/7, 7 days a week.

## 2017-12-06 NOTE — PROGRESS NOTE ADULT - SUBJECTIVE AND OBJECTIVE BOX
HOSPITAL COURSE:  89 yo M with h/o HTN, HLD, CAD s/p stent (>10 years ago), AS, AAA, and essential thrombocytosis, and an asymptomatic 5.6 cm AAA s/p endovascular repair on 11/16; now presenting with generalized weakness and persistent abdominal pain since discharge from prior hospitalization. He was previllously admitted on 11/7 with melena and anemia (hgb 6), received 2 units pRBC and Hgb stabilized above 8.  Upper Endoscopy at the time showed gastritis and hiatal hernia, no source of bleeding was noted. Colonoscopy was performed and was unremarkable. On CT, pt was noted to have 2 mm progression of AAA (5.4cm) and was evaluated by Vascular Surgery for EVAR. GI cleared pt for vascular procedure.  Pt was seen by Dr Hsu, Cardiology, for cardiac clearance.  An echo was performed that showed progression of AS from mild to moderate, small pericardial effusion, EF 55-60%.    Pt states that since his last discharge, he has been experiencing generalized weakness and abdominal pain. The pain is 2/10, achy, non radiating, with no alleviating or exacerbating factors. He has had episodes of vomiting, non bilious, non bloody. Per home aid, PT noticed him desatting to low 90s while working with him at home. ROS otherwise negative. Per HHA, pt has been more SOB with labored breathing when he walks. Patient himself denies any chest pain, palpitations, dizziness, LE pain or swelling. Denies personal or family history of blood clots.     In ED, T 97.4, , /73, RR 20, SpO2 90% on RA. CBC with macrocytic anemia (H/H 9.2/28.3), CMP unremarkable, Cardiac enzymes with Trop I 0.90, pro-BNP 84081.  ABG wnl. CTA with innumerable pulmonary emboli, right heart strain; small endoleak; mild cardiomegaly; mall pericardial effusion. Pt was placed on BIPAP for desatting to 80s. He was transferred to 7 LA, weaned of BIPAP. He was started on Lovenox. ECHO revealed severe LV and pulm HTN (83 mmHg).     INTERVAL HPI/OVERNIGHT EVENTS:    ROS  CV: Denies chest pain, palpitations  RESP: Denies SOB  GI: Denies abdominal pain, constipation, diarrhea, nausea, vomiting  : Denies dysuria, hematuria, flank or back pain  ID: Denies fevers, chills  MSK: Denies joint pain   DERM: Denies any rashes, bruising, pruritis  NEURO: No headaches, blurry vision, double vision  ENDO: Denies heat or cold intolerances, changes in weight, thinning of hair  PSYCH: Denies any mood changes    VITAL SIGNS:  T(F): 98.4 (12-06-17 @ 06:00), Max: 99.5 (12-06-17 @ 02:08)  HR: 66 (12-06-17 @ 06:11) (62 - 70)  BP: 123/58 (12-06-17 @ 04:11) (110/55 - 154/70)  BP(mean): 83 (12-06-17 @ 04:11) (78 - 106)  RR: 21 (12-06-17 @ 06:11) (18 - 25)  SpO2: 97% (12-06-17 @ 06:11) (96% - 100%)    12-04-17 @ 07:01  -  12-05-17 @ 07:00  --------------------------------------------------------  IN: 30 mL / OUT: 1450 mL / NET: -1420 mL    12-05-17 @ 07:01  -  12-06-17 @ 06:36  --------------------------------------------------------  IN: 30 mL / OUT: 0 mL / NET: 30 mL    PHYSICAL EXAM:  Constitutional: WDWN, NAD, resting comfortably   Head: NC/AT  Eyes: PERRL, EOMI, anicteric sclera, no mucosal pallor  ENT: no nasal discharge; uvula midline, no oropharyngeal erythema or exudates; MMM  Neck: supple; JVD present, no thyromegaly, no lymphadenopathy  Respiratory: CTA B/L; no W/R/R, no retractions  Cardiac: +S1/S2; RRR; no M/R/G; PMI non-displaced  Gastrointestinal: pulsatile aorta felt on deep palpation, abdomen soft, NT/ND; no rebound or guarding; +BSx4  Back: spine midline, no bony tenderness or step-offs; no CVAT B/L  Extremities: warm; no calf tenderness, no pedal edema, no cyanosis, no clubbing  Musculoskeletal: NROM x4; no joint swelling, tenderness or erythema  Vascular: 2+ radial; DP/PT pulses B/L  Dermatologic: no rash, no petechia   Lymphatic: no submandibular or cervical LAD  Neurologic: AAOx3; CNII-XII grossly intact; 5/5 strength throughout, sensory symmetrically intact in B/L LE   Psychiatric: pleasant and conversive; affect and characteristics of appearance, verbalizations, behaviors are appropriate    MEDICATIONS  (STANDING):  aspirin  chewable 81 milliGRAM(s) Oral daily  docusate sodium 100 milliGRAM(s) Oral daily  enoxaparin Injectable 80 milliGRAM(s) SubCutaneous two times a day  hydroxyurea 500 milliGRAM(s) Oral every 12 hours  pantoprazole    Tablet 40 milliGRAM(s) Oral before breakfast    MEDICATIONS  (PRN):  temazepam 15 milliGRAM(s) Oral at bedtime PRN Insomnia      Allergies    No Known Allergies    Intolerances        LABS:                        8.9    4.3   )-----------( 485      ( 05 Dec 2017 06:59 )             27.9     12-05    142  |  99  |  20  ----------------------------<  95  3.9   |  28  |  1.21    Ca    8.2<L>      05 Dec 2017 06:59  Phos  4.3     12-05  Mg     2.0     12-05    TPro  6.4  /  Alb  2.9<L>  /  TBili  0.6  /  DBili  x   /  AST  30  /  ALT  13  /  AlkPhos  87  12-04    PT/INR - ( 04 Dec 2017 10:30 )   PT: 13.1 sec;   INR: 1.18          PTT - ( 05 Dec 2017 11:14 )  PTT:40.9 sec      RADIOLOGY & ADDITIONAL TESTS: HOSPITAL COURSE:  89 yo M with h/o HTN, HLD, CAD s/p stent (>10 years ago), AS, AAA, and essential thrombocytosis, and an asymptomatic 5.6 cm AAA s/p endovascular repair on 11/16; now presenting with generalized weakness and persistent abdominal pain since discharge from prior hospitalization. He was previllously admitted on 11/7 with melena and anemia (hgb 6), received 2 units pRBC and Hgb stabilized above 8.  Upper Endoscopy at the time showed gastritis and hiatal hernia, no source of bleeding was noted. Colonoscopy was performed and was unremarkable. On CT, pt was noted to have 2 mm progression of AAA (5.4cm) and was evaluated by Vascular Surgery for EVAR. GI cleared pt for vascular procedure.  Pt was seen by Dr Hsu, Cardiology, for cardiac clearance.  An echo was performed that showed progression of AS from mild to moderate, small pericardial effusion, EF 55-60%.    Pt states that since his last discharge, he has been experiencing generalized weakness, abdominal pain and episodes of vomiting, and desatting to low 90s while working with home PT. In ED, T 97.4, , /73, RR 20, SpO2 90% on RA. Cardiac enzymes with Trop I 0.90 (peaked at 0.10), pro-BNP 14834.  ABG wnl. CTA with innumerable pulmonary emboli, right heart strain; small endoleak; mild cardiomegaly; mall pericardial effusion. Pt was placed on BIPAP for desatting to 80s. He was transferred to 7 LA, weaned of BIPAP. He was started on Lovenox. ECHO revealed severe LV and pulm HTN (83 mmHg). He remained hemodynamically stable on the floor was weaned to NC 5L on 12/6. He was transferred to [facility] for further management.     INTERVAL HPI/OVERNIGHT EVENTS: No acute events    ROS  CV: Denies chest pain, palpitations  RESP: Denies SOB  GI: Denies abdominal pain, constipation, diarrhea, nausea, vomiting  : Denies dysuria, hematuria, flank or back pain  ID: Denies fevers, chills  MSK: Denies joint pain   DERM: Denies any rashes, bruising, pruritis  NEURO: No headaches, blurry vision, double vision  ENDO: Denies heat or cold intolerances, changes in weight, thinning of hair  PSYCH: Denies any mood changes    VITAL SIGNS:  T(F): 98.4 (12-06-17 @ 06:00), Max: 99.5 (12-06-17 @ 02:08)  HR: 66 (12-06-17 @ 06:11) (62 - 70)  BP: 123/58 (12-06-17 @ 04:11) (110/55 - 154/70)  BP(mean): 83 (12-06-17 @ 04:11) (78 - 106)  RR: 21 (12-06-17 @ 06:11) (18 - 25)  SpO2: 97% (12-06-17 @ 06:11) (96% - 100%)    12-04-17 @ 07:01  -  12-05-17 @ 07:00  --------------------------------------------------------  IN: 30 mL / OUT: 1450 mL / NET: -1420 mL    12-05-17 @ 07:01  -  12-06-17 @ 06:36  --------------------------------------------------------  IN: 30 mL / OUT: 0 mL / NET: 30 mL    PHYSICAL EXAM:  Constitutional: WDWN, NAD, resting comfortably   Head: NC/AT  Eyes: PERRL, EOMI, anicteric sclera, no mucosal pallor  ENT: no nasal discharge; uvula midline, no oropharyngeal erythema or exudates; MMM  Neck: supple; JVD present, no thyromegaly, no lymphadenopathy  Respiratory: CTA B/L, poor inspiratory effort; no W/R/R, no retractions  Cardiac: +S1/S2; RRR; no M/R/G; PMI non-displaced  Gastrointestinal: pulsatile aorta felt on deep palpation, abdomen soft, NT/ND; no rebound or guarding; +BSx4  Back: spine midline, no bony tenderness or step-offs; no CVAT B/L  Extremities: warm; no calf tenderness, no pedal edema, no cyanosis, no clubbing  Musculoskeletal: NROM x4; no joint swelling, tenderness or erythema  Vascular: 2+ radial; DP/PT pulses B/L  Dermatologic: no rash, no petechia   Lymphatic: no submandibular or cervical LAD  Neurologic: AAOx3; CNII-XII grossly intact; 5/5 strength throughout, sensory symmetrically intact in B/L LE   Psychiatric: pleasant and conversive; affect and characteristics of appearance, verbalizations, behaviors are appropriate    MEDICATIONS  (STANDING):  aspirin  chewable 81 milliGRAM(s) Oral daily  docusate sodium 100 milliGRAM(s) Oral daily  enoxaparin Injectable 80 milliGRAM(s) SubCutaneous two times a day  hydroxyurea 500 milliGRAM(s) Oral every 12 hours  pantoprazole    Tablet 40 milliGRAM(s) Oral before breakfast    MEDICATIONS  (PRN):  temazepam 15 milliGRAM(s) Oral at bedtime PRN Insomnia      Allergies    No Known Allergies    Intolerances        LABS:                         8.6    5.1   )-----------( 485      ( 06 Dec 2017 07:12 )             27.8                        8.9    4.3   )-----------( 485      ( 05 Dec 2017 06:59 )             27.9   12-06    140  |  100  |  23  ----------------------------<  111<H>  4.0   |  30  |  1.12    Ca    8.3<L>      06 Dec 2017 07:12  Phos  3.3     12-06  Mg     2.0     12-06    12-05    142  |  99  |  20  ----------------------------<  95  3.9   |  28  |  1.21    Ca    8.2<L>      05 Dec 2017 06:59  Phos  4.3     12-05  Mg     2.0     12-05    TPro  6.4  /  Alb  2.9<L>  /  TBili  0.6  /  DBili  x   /  AST  30  /  ALT  13  /  AlkPhos  87  12-04    PT/INR - ( 04 Dec 2017 10:30 )   PT: 13.1 sec;   INR: 1.18          PTT - ( 05 Dec 2017 11:14 )  PTT:40.9 sec      RADIOLOGY & ADDITIONAL TESTS:

## 2017-12-06 NOTE — PHYSICAL THERAPY INITIAL EVALUATION ADULT - IMPAIRMENTS FOUND, PT EVAL
gait, locomotion, and balance/aerobic capacity/endurance/posture/muscle strength/ventilation and respiration/gas exchange

## 2017-12-06 NOTE — PROGRESS NOTE ADULT - ASSESSMENT
89 yo M with PMH of CAD s/p stent (>10 years ago), moderate AS, essential thrombocytosis (on hydroxyurea), AAA s/p EVAR on 11/16/17, and hx of GI bleed 3 weeks ago, presents with CALDWELL, found to have bilateral PE

## 2017-12-06 NOTE — PHYSICAL THERAPY INITIAL EVALUATION ADULT - CRITERIA FOR SKILLED THERAPEUTIC INTERVENTIONS
impairments found/functional limitations in following categories/risk reduction/prevention/rehab potential/anticipated discharge recommendation/therapy frequency

## 2017-12-06 NOTE — PHYSICAL THERAPY INITIAL EVALUATION ADULT - GENERAL OBSERVATIONS, REHAB EVAL
Received semi-supine in bed with +2.5LPM O2 via NC, +hep lock, +home health aide, +nephew, +bed alarm, in no apparent distress. Patient denies pain at rest

## 2017-12-06 NOTE — PROGRESS NOTE ADULT - ASSESSMENT
89 yo M with PMH of CAD s/p stent (>10 years ago), moderate AS, essential thrombocytosis (on hydroxyurea), AAA s/p EVAR on 11/16/17, and hx of GI bleed 3 weeks ago, presents with CALDWELL, found to have bilateral PE and small endoleak. Initially admitted to [facility] on BIPAP now stable on NC O2 and transitioned to Eliquis, ready for SD to [facility].

## 2017-12-06 NOTE — PROGRESS NOTE ADULT - PROBLEM SELECTOR PLAN 2
Evidence of right heart strain on CT PE and elevated troponin (0.09) and BNP (~24839). EKG with sinus rhythm at 88, ST wave inversion in anterior lateral leads (II, III, aVF, V3-V5). Pt relatively hypotensive compared to prior admission.  - pt currently improved, euvolemic on exam

## 2017-12-06 NOTE — CONSULT NOTE ADULT - ASSESSMENT
89 yo M with PMH of CAD s/p stent (>10 years ago), moderate AS, essential thrombocytosis (on hydroxyurea), AAA s/p EVAR on 11/16/17, and hx of GI bleed 3 weeks ago, presents with CALDWELL, found to have bilateral PE    Problem/Plan - 1:  ·  Problem: Pulmonary embolus.  Plan: Pulmonary emboli are seen within lobar, segmental and subsegmental branches of each pulmonary lobe bilaterally, with evidence of right heart strain on CT PE and elevated troponin (0.09) and BNP (~90838). 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 Pt currently mentating well, HD stable  -ECHO with severe RV dilation and severe pulmonary HTN (83 mmHq).   -Heparin gtt switched to 80 mg Lovenox BID  - Weaned of BIPAP to HFNC, will wean as tolerated   - c/w heparin gtt, check PTT q4-6hrs, next PTT 8:00 PM  - f/u LE venous duplex b/l  - Trops peaked at 0.10  - Holding home 10 mg Hydralazine at this point (per HHA, pt no longer taking it)  - Maintain active T&S, monitor CBC as pt with high bleeding risk given recent GI bleed and EVAR.     Problem/Plan - 2:  ·  Problem: Acute right heart failure.  Plan: Evidence of right heart strain on CT PE and elevated troponin (0.09) and BNP (~34731). EKG with sinus rhythm at 88, ST wave inversion in anterior lateral leads (II, III, aVF, V3-V5). Pt relatively hypotensive compared to prior admission   -Monitor morning EKG and trend trops.

## 2017-12-06 NOTE — PROGRESS NOTE ADULT - PROBLEM SELECTOR PLAN 2
Evidence of right heart strain on CT PE and elevated troponin (0.09) and BNP (~71909). EKG with sinus rhythm at 88, ST wave inversion in anterior lateral leads (II, III, aVF, V3-V5). Pt relatively hypotensive compared to prior admission   -Monitor morning EKG and trend trops Evidence of right heart strain on CT PE and elevated troponin (0.09) and BNP (~55133). EKG with sinus rhythm at 88, ST wave inversion in anterior lateral leads (II, III, aVF, V3-V5). Pt relatively hypotensive compared to prior admission   -Monitor morning EKG

## 2017-12-06 NOTE — PROGRESS NOTE ADULT - SUBJECTIVE AND OBJECTIVE BOX
OVERNIGHT EVENTS:    SUBJECTIVE / INTERVAL HPI: Patient seen and examined at bedside.     VITAL SIGNS:  Vital Signs Last 24 Hrs  T(C): 36.4 (06 Dec 2017 14:08), Max: 37.5 (06 Dec 2017 02:08)  T(F): 97.5 (06 Dec 2017 14:08), Max: 99.5 (06 Dec 2017 02:08)  HR: 72 (06 Dec 2017 12:55) (62 - 75)  BP: 112/56 (06 Dec 2017 12:55) (110/55 - 154/70)  BP(mean): 80 (06 Dec 2017 12:55) (78 - 106)  RR: 26 (06 Dec 2017 12:55) (19 - 26)  SpO2: 95% (06 Dec 2017 12:55) (90% - 100%)    PHYSICAL EXAM:    General: WDWN  HEENT: NC/AT; PERRL, anicteric sclera; MMM  Neck: supple  Cardiovascular: +S1/S2; RRR; no M/R/G on auscultation  Respiratory: CTA B/L; no W/R/R  Gastrointestinal: soft, NT/ND; +BSx4  Extremities: WWP; no edema, clubbing or cyanosis  Vascular: 2+ radial, DP/PT pulses B/L  Neurological: AAOx3; no focal deficits    MEDICATIONS:  MEDICATIONS  (STANDING):  apixaban 10 milliGRAM(s) Oral every 12 hours  aspirin  chewable 81 milliGRAM(s) Oral daily  docusate sodium 100 milliGRAM(s) Oral daily  hydroxyurea 500 milliGRAM(s) Oral every 12 hours  pantoprazole    Tablet 40 milliGRAM(s) Oral before breakfast    MEDICATIONS  (PRN):  temazepam 15 milliGRAM(s) Oral at bedtime PRN Insomnia      ALLERGIES:  Allergies    No Known Allergies    Intolerances        LABS:                        8.6    5.1   )-----------( 485      ( 06 Dec 2017 07:12 )             27.8     12-06    140  |  100  |  23  ----------------------------<  111<H>  4.0   |  30  |  1.12    Ca    8.3<L>      06 Dec 2017 07:12  Phos  3.3     12-06  Mg     2.0     12-06      PTT - ( 06 Dec 2017 07:12 )  PTT:33.2 sec    CAPILLARY BLOOD GLUCOSE          RADIOLOGY & ADDITIONAL TESTS:   EKG:   CXR:   CT:   MRI:    ASSESSMENT:    PLAN: TRANSFER NOTE/ACCEPTANCE OF CARE (7UR)    HOSPITAL COURSE:     SUBJECTIVE / INTERVAL HPI: Patient seen and examined at bedside.     VITAL SIGNS:  Vital Signs Last 24 Hrs  T(C): 36.4 (06 Dec 2017 14:08), Max: 37.5 (06 Dec 2017 02:08)  T(F): 97.5 (06 Dec 2017 14:08), Max: 99.5 (06 Dec 2017 02:08)  HR: 72 (06 Dec 2017 12:55) (62 - 75)  BP: 112/56 (06 Dec 2017 12:55) (110/55 - 154/70)  BP(mean): 80 (06 Dec 2017 12:55) (78 - 106)  RR: 26 (06 Dec 2017 12:55) (19 - 26)  SpO2: 95% (06 Dec 2017 12:55) (90% - 100%)    PHYSICAL EXAM:    General: WDWN  HEENT: NC/AT; PERRL, anicteric sclera; MMM  Neck: supple  Cardiovascular: +S1/S2; RRR; no M/R/G on auscultation  Respiratory: CTA B/L; no W/R/R  Gastrointestinal: soft, NT/ND; +BSx4  Extremities: WWP; no edema, clubbing or cyanosis  Vascular: 2+ radial, DP/PT pulses B/L  Neurological: AAOx3; no focal deficits    MEDICATIONS:  MEDICATIONS  (STANDING):  apixaban 10 milliGRAM(s) Oral every 12 hours  aspirin  chewable 81 milliGRAM(s) Oral daily  docusate sodium 100 milliGRAM(s) Oral daily  hydroxyurea 500 milliGRAM(s) Oral every 12 hours  pantoprazole    Tablet 40 milliGRAM(s) Oral before breakfast    MEDICATIONS  (PRN):  temazepam 15 milliGRAM(s) Oral at bedtime PRN Insomnia      ALLERGIES:  Allergies    No Known Allergies    Intolerances        LABS:                        8.6    5.1   )-----------( 485      ( 06 Dec 2017 07:12 )             27.8     12-06    140  |  100  |  23  ----------------------------<  111<H>  4.0   |  30  |  1.12    Ca    8.3<L>      06 Dec 2017 07:12  Phos  3.3     12-06  Mg     2.0     12-06      PTT - ( 06 Dec 2017 07:12 )  PTT:33.2 sec    CAPILLARY BLOOD GLUCOSE          RADIOLOGY & ADDITIONAL TESTS:   EKG:   CXR:   CT:   MRI:    ASSESSMENT:    PLAN: TRANSFER NOTE/ACCEPTANCE OF CARE (7URIS)    HOSPITAL COURSE: 89M PMH HTN, HLD, CAD (s/p stent >10y. ago), AS (moderate, based on 11/2017 ECHO w/ EF 55-60%), AAA (asymptomatic 5.6cm, now s/p EVAR 11/16), essential thrombocytosis, recent admission for melena w/ anemia to Hgb 6 (s/p EGD, C-scope showing no source of bleeding) presented initially with generalized weakness, persistent abdominal pain, and emesis since prior d/c. He was also noted to have desaturation to low 90s while working with home PT. On admission, patient w/ elevated troponins, BNP and CTA showing innumerable PE w/ signs of R heart strain as well as small endoleak but no change in size of the aneurysm. ECHO showing preserved EF along with new RV reduced function and pHTN (PA pressure in 80s). Pt admitted to [facility], placed on BIPAP for respiratory support and subsequently transitioned to HF NC and then to NC O2 on 12/6 with good saturation. Pt was initially started on hep gtt --> Lovenox and now transitioned to Eliquis for AC. Vascular surgery consulted RE: endoleak and AC, agreed with plan for anti-coagulation and no intervention at this time given stable aneurysm. Pt remained HD stable on NC O2 (5L), ready for SD to F for continued care.     SUBJECTIVE / INTERVAL HPI: Patient seen and examined at bedside. Pt stating that his breathing is much improved and he is comfortable on NC O2. Denies any continued abdominal pain, nausea, vomiting, palpitations, chest pain. ROS otherwise negative.    VITAL SIGNS:  Vital Signs Last 24 Hrs  T(C): 36.4 (06 Dec 2017 14:08), Max: 37.5 (06 Dec 2017 02:08)  T(F): 97.5 (06 Dec 2017 14:08), Max: 99.5 (06 Dec 2017 02:08)  HR: 72 (06 Dec 2017 12:55) (62 - 75)  BP: 112/56 (06 Dec 2017 12:55) (110/55 - 154/70)  BP(mean): 80 (06 Dec 2017 12:55) (78 - 106)  RR: 26 (06 Dec 2017 12:55) (19 - 26)  SpO2: 95% (06 Dec 2017 12:55) (90% - 100%)    PHYSICAL EXAM:    General: WDWN  HEENT: NC/AT; PERRL, anicteric sclera; MMM  Neck: supple  Cardiovascular: +S1/S2; RRR; no M/R/G on auscultation  Respiratory: CTA B/L; no W/R/R  Gastrointestinal: soft, NT/ND; +BSx4  Extremities: WWP; no edema, clubbing or cyanosis  Vascular: 2+ radial, DP/PT pulses B/L  Neurological: AAOx3; no focal deficits    MEDICATIONS:  MEDICATIONS  (STANDING):  apixaban 10 milliGRAM(s) Oral every 12 hours  aspirin  chewable 81 milliGRAM(s) Oral daily  docusate sodium 100 milliGRAM(s) Oral daily  hydroxyurea 500 milliGRAM(s) Oral every 12 hours  pantoprazole    Tablet 40 milliGRAM(s) Oral before breakfast    MEDICATIONS  (PRN):  temazepam 15 milliGRAM(s) Oral at bedtime PRN Insomnia      ALLERGIES:  Allergies    No Known Allergies    Intolerances        LABS:                        8.6    5.1   )-----------( 485      ( 06 Dec 2017 07:12 )             27.8     12-06    140  |  100  |  23  ----------------------------<  111<H>  4.0   |  30  |  1.12    Ca    8.3<L>      06 Dec 2017 07:12  Phos  3.3     12-06  Mg     2.0     12-06      PTT - ( 06 Dec 2017 07:12 )  PTT:33.2 sec    CAPILLARY BLOOD GLUCOSE          RADIOLOGY & ADDITIONAL TESTS: Reviewed. TRANSFER NOTE/ACCEPTANCE OF CARE (7URIS)    HOSPITAL COURSE: 89M PMH HTN, HLD, CAD (s/p stent >10y. ago), AS (moderate, based on 11/2017 ECHO w/ EF 55-60%), AAA (asymptomatic 5.6cm, now s/p EVAR 11/16), essential thrombocytosis, recent admission for melena w/ anemia to Hgb 6 (s/p EGD, C-scope showing no source of bleeding) presented initially with generalized weakness, persistent abdominal pain, and emesis since prior d/c. He was also noted to have desaturation to low 90s while working with home PT. On admission, patient w/ elevated troponins, BNP and CTA showing innumerable PE w/ signs of R heart strain as well as small endoleak but no change in size of the aneurysm. ECHO showing preserved EF along with new RV reduced function and pHTN (PA pressure in 80s). Pt admitted to [facility], placed on BIPAP for respiratory support and subsequently transitioned to HF NC and then to NC O2 on 12/6 with good saturation. Pt was initially started on hep gtt --> Lovenox and now transitioned to Eliquis for AC. Vascular surgery consulted RE: endoleak and AC, agreed with plan for anti-coagulation and no intervention at this time given stable aneurysm. Pt remained HD stable on NC O2 (5L), ready for SD to F for continued care.     SUBJECTIVE / INTERVAL HPI: Patient seen and examined at bedside. Pt stating that his breathing is much improved and he is comfortable on NC O2. Denies any continued abdominal pain, nausea, vomiting, palpitations, chest pain. +CALDWELL at only a few steps away from bed w/ PT. ROS otherwise negative.    VITAL SIGNS:  Vital Signs Last 24 Hrs  T(C): 36.4 (06 Dec 2017 14:08), Max: 37.5 (06 Dec 2017 02:08)  T(F): 97.5 (06 Dec 2017 14:08), Max: 99.5 (06 Dec 2017 02:08)  HR: 72 (06 Dec 2017 12:55) (62 - 75)  BP: 112/56 (06 Dec 2017 12:55) (110/55 - 154/70)  BP(mean): 80 (06 Dec 2017 12:55) (78 - 106)  RR: 26 (06 Dec 2017 12:55) (19 - 26)  SpO2: 95% (06 Dec 2017 12:55) (90% - 100%)    PHYSICAL EXAM:    General: WDWN, alert, no acute distress on NC o2  HEENT: NC/AT; PERRL, MMM  Neck: supple; no JVD appreciated on this afternoon's exam  Cardiovascular: +S1/S2; regular; no m/r/g  Respiratory: breathing appears comfortable on NC O2; no respiratory distress; no accessory muscle use; no cough; lungs clear b/l w/o w/r/r  Gastrointestinal: soft, bowel sounds present; no rebound or guarding  Extremities: WWP; no edema  Vascular: 2+ peripheral pulses  Neurological: AAOx3; no focal deficits    MEDICATIONS:  MEDICATIONS  (STANDING):  apixaban 10 milliGRAM(s) Oral every 12 hours  aspirin  chewable 81 milliGRAM(s) Oral daily  docusate sodium 100 milliGRAM(s) Oral daily  hydroxyurea 500 milliGRAM(s) Oral every 12 hours  pantoprazole    Tablet 40 milliGRAM(s) Oral before breakfast    MEDICATIONS  (PRN):  temazepam 15 milliGRAM(s) Oral at bedtime PRN Insomnia      ALLERGIES:  Allergies    No Known Allergies    Intolerances        LABS:                        8.6    5.1   )-----------( 485      ( 06 Dec 2017 07:12 )             27.8     12-06    140  |  100  |  23  ----------------------------<  111<H>  4.0   |  30  |  1.12    Ca    8.3<L>      06 Dec 2017 07:12  Phos  3.3     12-06  Mg     2.0     12-06      PTT - ( 06 Dec 2017 07:12 )  PTT:33.2 sec    CAPILLARY BLOOD GLUCOSE          RADIOLOGY & ADDITIONAL TESTS: Reviewed.

## 2017-12-06 NOTE — PROGRESS NOTE ADULT - PROBLEM SELECTOR PLAN 1
Pulmonary emboli are seen within lobar, segmental and subsegmental branches of each pulmonary lobe bilaterally, with evidence of right heart strain on CT PE and elevated troponin (0.09) and BNP (~92379). 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  - LE venous duplex revealed bilateral deep femoral veins DVT. Midportion of the left femoral vein DVT. Left popliteal vein DVT.  -ECHO with severe RV dilation and severe pulmonary HTN (83 mmHq)  -trops peaked at 0.10  -Lovenox switched to Eliquis first dose PM 12/6 (10mg BID x7d). Will transition to 5mg BID after loading dose complete.  -Weaned of BIPAP to HFNC and now to NC O2, saturating well  - Maintain active T&S, monitor CBC as pt with high bleeding risk given recent GI bleed and EVAR

## 2017-12-06 NOTE — PHYSICAL THERAPY INITIAL EVALUATION ADULT - DISCHARGE DISPOSITION, PT EVAL
Home with home PT and continued 24/7 assist pending functional progress and successful completion of stair training versus Subacute Rehab

## 2017-12-07 ENCOUNTER — TRANSCRIPTION ENCOUNTER (OUTPATIENT)
Age: 82
End: 2017-12-07

## 2017-12-07 VITALS
OXYGEN SATURATION: 95 % | RESPIRATION RATE: 18 BRPM | TEMPERATURE: 97 F | DIASTOLIC BLOOD PRESSURE: 70 MMHG | HEART RATE: 73 BPM | SYSTOLIC BLOOD PRESSURE: 117 MMHG

## 2017-12-07 DIAGNOSIS — F41.1 GENERALIZED ANXIETY DISORDER: ICD-10-CM

## 2017-12-07 LAB
ANION GAP SERPL CALC-SCNC: 11 MMOL/L — SIGNIFICANT CHANGE UP (ref 5–17)
BLD GP AB SCN SERPL QL: NEGATIVE — SIGNIFICANT CHANGE UP
BUN SERPL-MCNC: 23 MG/DL — SIGNIFICANT CHANGE UP (ref 7–23)
CALCIUM SERPL-MCNC: 8.5 MG/DL — SIGNIFICANT CHANGE UP (ref 8.4–10.5)
CHLORIDE SERPL-SCNC: 100 MMOL/L — SIGNIFICANT CHANGE UP (ref 96–108)
CO2 SERPL-SCNC: 29 MMOL/L — SIGNIFICANT CHANGE UP (ref 22–31)
CREAT SERPL-MCNC: 1.07 MG/DL — SIGNIFICANT CHANGE UP (ref 0.5–1.3)
GLUCOSE SERPL-MCNC: 118 MG/DL — HIGH (ref 70–99)
HCT VFR BLD CALC: 26.1 % — LOW (ref 39–50)
HGB BLD-MCNC: 8.4 G/DL — LOW (ref 13–17)
MAGNESIUM SERPL-MCNC: 2 MG/DL — SIGNIFICANT CHANGE UP (ref 1.6–2.6)
MCHC RBC-ENTMCNC: 32.2 G/DL — SIGNIFICANT CHANGE UP (ref 32–36)
MCHC RBC-ENTMCNC: 33.1 PG — SIGNIFICANT CHANGE UP (ref 27–34)
MCV RBC AUTO: 102.8 FL — HIGH (ref 80–100)
PLATELET # BLD AUTO: 426 K/UL — HIGH (ref 150–400)
POTASSIUM SERPL-MCNC: 4 MMOL/L — SIGNIFICANT CHANGE UP (ref 3.5–5.3)
POTASSIUM SERPL-SCNC: 4 MMOL/L — SIGNIFICANT CHANGE UP (ref 3.5–5.3)
RBC # BLD: 2.54 M/UL — LOW (ref 4.2–5.8)
RBC # FLD: 21.4 % — HIGH (ref 10.3–16.9)
RH IG SCN BLD-IMP: POSITIVE — SIGNIFICANT CHANGE UP
SODIUM SERPL-SCNC: 140 MMOL/L — SIGNIFICANT CHANGE UP (ref 135–145)
WBC # BLD: 4.1 K/UL — SIGNIFICANT CHANGE UP (ref 3.8–10.5)
WBC # FLD AUTO: 4.1 K/UL — SIGNIFICANT CHANGE UP (ref 3.8–10.5)

## 2017-12-07 PROCEDURE — 99239 HOSP IP/OBS DSCHRG MGMT >30: CPT

## 2017-12-07 PROCEDURE — 99233 SBSQ HOSP IP/OBS HIGH 50: CPT | Mod: GC

## 2017-12-07 RX ORDER — APIXABAN 2.5 MG/1
2 TABLET, FILM COATED ORAL
Qty: 12 | Refills: 0 | OUTPATIENT
Start: 2017-12-07

## 2017-12-07 RX ORDER — APIXABAN 2.5 MG/1
1 TABLET, FILM COATED ORAL
Qty: 60 | Refills: 0 | OUTPATIENT
Start: 2017-12-07 | End: 2018-01-06

## 2017-12-07 RX ADMIN — APIXABAN 10 MILLIGRAM(S): 2.5 TABLET, FILM COATED ORAL at 10:10

## 2017-12-07 RX ADMIN — PANTOPRAZOLE SODIUM 40 MILLIGRAM(S): 20 TABLET, DELAYED RELEASE ORAL at 06:29

## 2017-12-07 RX ADMIN — HYDROXYUREA 500 MILLIGRAM(S): 500 CAPSULE ORAL at 13:28

## 2017-12-07 RX ADMIN — Medication 100 MILLIGRAM(S): at 13:28

## 2017-12-07 RX ADMIN — Medication 81 MILLIGRAM(S): at 13:28

## 2017-12-07 NOTE — PROGRESS NOTE ADULT - PROBLEM SELECTOR PLAN 8
F: no IVF  E: Monitor BMP, replete lytes K>4, Mg>2 PRN  N: DASH/TLC diet
Diet: Regular diet as tolerated  Bowel regiment: Colace PRN   DVT ppx: Pt on full anticoagulation with Hep drip  Lyte: Replete PRN with goal K>4 and Mg >2  DISPO:
Diet: Regular diet as tolerated  Bowel regiment: Colace PRN   DVT ppx: Pt on full anticoagulation with Hep drip  Lyte: Replete PRN with goal K>4 and Mg >2  DISPO:
F: no IVF  E: Monitor BMP, replete lytes K>4, Mg>2 PRN  N: DASH/TLC diet

## 2017-12-07 NOTE — PROGRESS NOTE ADULT - PROBLEM SELECTOR PLAN 6
-c/w Lexapro 10 mg PO qd
c/w lexapro 10 mg PO daily
-c/w Lexapro 10 mg PO qd
c/w lexapro 10 mg PO daily

## 2017-12-07 NOTE — PROGRESS NOTE ADULT - PROBLEM SELECTOR PLAN 9
VTE ppx: therapeutic AC w/ Eliquis  GI ppx: Protonix 40mg Po qd  Code Status: Full Code  Dispo: SD from [facility] to [facility], likely
VTE ppx: therapeutic AC w/ Eliquis  GI ppx: Protonix 40mg PO qd  Code Status: Full Code  Dispo: RMF pending clinical improvement

## 2017-12-07 NOTE — DISCHARGE NOTE ADULT - PATIENT PORTAL LINK FT
“You can access the Follow[facility] Patient Portal, offered by [facility], by registering with the following website: [email or web address]”

## 2017-12-07 NOTE — PROGRESS NOTE ADULT - PROBLEM SELECTOR PLAN 1
He is to continue on anticoagulation.  He improved and n HFNC and to continue on weaning the oxygen. On Apixaban  The duration of anticoagulation in at least 6 month for submassive PE that is provoked  I discussed the case with the nephew.  the patient is clinically stable for discharge.Duration is six-month but my be of extended if the patient is immobile. I discussed precaution for foals and bleeding. No by research

## 2017-12-07 NOTE — DISCHARGE NOTE ADULT - CARE PROVIDER_API CALL
Donna Jacob), Critical Care Medicine; Pulmonary Disease  [address]  [address]  [place] [address]  Phone: [phone]  Fax: [phone]    Haile White), Surgery; Vascular Surgery  [address]  [address] [place] [address]  Phone: [phone]  Fax: [phone]

## 2017-12-07 NOTE — PROGRESS NOTE ADULT - PROBLEM SELECTOR PLAN 7
- c/w home temazepam 15 mg PRN for insomnia
c/w home temazepam 15 mg PRN for insomnia
- c/w home temazepam 15 mg PRN for insomnia
c/w home temazepam 15 mg PRN for insomnia

## 2017-12-07 NOTE — PROGRESS NOTE ADULT - SUBJECTIVE AND OBJECTIVE BOX
Interval Events: revewed  Patient seen and examined at bedside.    Patient is a 89y old  Male who presents with a chief complaint of Shortness of breath (07 Dec 2017 12:17)  is doing better and going home today    PAST MEDICAL & SURGICAL HISTORY:  Foot drop, left  CAD (coronary artery disease)  GERD (gastroesophageal reflux disease)  Aneurysm  History of cholecystectomy  H/O heart artery stent: x 2  History of back surgery: x 4      MEDICATIONS:  Pulmonary:    Antimicrobials:    Anticoagulants:  apixaban 10 milliGRAM(s) Oral every 12 hours  aspirin  chewable 81 milliGRAM(s) Oral daily    Cardiac:      Allergies    No Known Allergies    Intolerances        Vital Signs Last 24 Hrs  T(C): 36.3 (07 Dec 2017 15:15), Max: 36.9 (07 Dec 2017 05:25)  T(F): 97.4 (07 Dec 2017 15:15), Max: 98.5 (07 Dec 2017 05:25)  HR: 73 (07 Dec 2017 15:15) (72 - 92)  BP: 117/70 (07 Dec 2017 15:15) (117/70 - 152/69)  BP(mean): 86 (07 Dec 2017 15:15) (86 - 86)  RR: 18 (07 Dec 2017 15:15) (17 - 18)  SpO2: 95% (07 Dec 2017 15:15) (92% - 99%)        LABS:      CBC Full  -  ( 07 Dec 2017 07:00 )  WBC Count : 4.1 K/uL  Hemoglobin : 8.4 g/dL  Hematocrit : 26.1 %  Platelet Count - Automated : 426 K/uL  Mean Cell Volume : 102.8 fL  Mean Cell Hemoglobin : 33.1 pg  Mean Cell Hemoglobin Concentration : 32.2 g/dL  Auto Neutrophil # : x  Auto Lymphocyte # : x  Auto Monocyte # : x  Auto Eosinophil # : x  Auto Basophil # : x  Auto Neutrophil % : x  Auto Lymphocyte % : x  Auto Monocyte % : x  Auto Eosinophil % : x  Auto Basophil % : x    12-07    140  |  100  |  23  ----------------------------<  118<H>  4.0   |  29  |  1.07    Ca    8.5      07 Dec 2017 07:00  Phos  3.3     12-06  Mg     2.0     12-07      PTT - ( 06 Dec 2017 07:12 )  PTT:33.2 sec                    RADIOLOGY & ADDITIONAL STUDIES (The following images were personally reviewed):  Riley:                                    No  Urine output:               Yes          DVT prophylaxis:         Yes          Flattus:                          Yes          Bowel movement:       Yes

## 2017-12-07 NOTE — DISCHARGE NOTE ADULT - MEDICATION SUMMARY - MEDICATIONS TO TAKE
I will START or STAY ON the medications listed below when I get home from the hospital:    aspirin 81 mg oral tablet, chewable  -- 1 tab(s) by mouth once a day  -- Indication: For CAD (coronary artery disease)    apixaban 5 mg oral tablet  -- 2 tab(s) by mouth every 12 hours for a total of 12 doses, to end on 12/13  -- Indication: For PULMONARY EMBOLUS    apixaban 5 mg oral tablet  -- 1 tab(s) by mouth every 12 hours, to start in the evening on 12/13 AFTER you have completed the initial loading dose   -- Indication: For PULMONARY EMBOLUS    Lexapro 10 mg oral tablet  -- 1 tab(s) by mouth once a day  -- Indication: For Depression    hydroxyurea 500 mg oral capsule  -- 1 cap(s) by mouth every 12 hours  -- Indication: For Thrombocytosis    temazepam 15 mg oral capsule  -- 2 cap(s) by mouth once a day (at bedtime)  -- Indication: For Insomnia    Colace 100 mg oral capsule  -- orally once a day  -- Indication: For Constipation    pantoprazole 40 mg oral delayed release tablet  -- 1 tab(s) by mouth once a day (before a meal)  -- Indication: For GERD (gastroesophageal reflux disease)

## 2017-12-07 NOTE — DISCHARGE NOTE ADULT - MEDICATION SUMMARY - MEDICATIONS TO STOP TAKING
I will STOP taking the medications listed below when I get home from the hospital:    hydrALAZINE 10 mg oral tablet  -- 1 tab(s) by mouth every 8 hours

## 2017-12-07 NOTE — DISCHARGE NOTE ADULT - HOSPITAL COURSE
89M PMH HTN, HLD, CAD (s/p stent >10y. ago), AS (moderate, based on 11/2017 ECHO w/ EF 55-60%), AAA (asymptomatic 5.6cm, now s/p EVAR 11/16), essential thrombocytosis, recent admission for melena w/ anemia to Hgb 6 (s/p EGD, C-scope showing no source of bleeding) presented initially with generalized weakness, persistent abdominal pain, and emesis since prior d/c. He was also noted to have desaturation to low 90s while working with home PT. On admission, patient w/ elevated troponins, BNP and CTA showing innumerable PE w/ signs of R heart strain as well as small endoleak but no change in size of the aneurysm. ECHO showing preserved EF along with new RV reduced function and pHTN (PA pressure in 80s). Pt admitted to telemetry unit, placed on BIPAP for respiratory support and subsequently transitioned to HF NC and then to NC O2 on 12/6 with good saturation. However, patient desaturates to 85% when placed on room air and requires 2-4L NC O2 supplementation to maintain acceptable oxygen saturation so pt will require home O2. Pt was initially started on hep gtt --> Lovenox and transitioned to Eliquis for AC. Vascular surgery consulted RE: endoleak and AC, agreed with plan for anti-coagulation and no intervention at this time given stable aneurysm and low risk of rupture. Pt will need to f/u with vascular surgery for ultrasound surveillance of endoleak before Solo. Pt remained HD stable on NC O2, ready for d/c home with outpatient follow up with pulmonology, vascular surgery, and primary care. Pt will need to continue anti-coagulation for likely a total course of 6 months but will need to f/u with outpatient providers for further refills and total treatment course.

## 2017-12-07 NOTE — PROGRESS NOTE ADULT - PROBLEM SELECTOR PROBLEM 8
Nutrition, metabolism, and development symptoms
Need for prophylactic measure
Need for prophylactic measure
Nutrition, metabolism, and development symptoms

## 2017-12-07 NOTE — PROGRESS NOTE ADULT - PROBLEM SELECTOR PLAN 4
Pt with Hx of thrombocytosis (baseline 350-570K from past admission), likely a risk factor for DVT/PE.  -c/w home hydroxyurea 500 mg PO q12
Pt with Hx of thrombocytosis (baseline 350-570K from past admission).  -c/w home hydroxyurea 500 mg PO Q12
Pt with Hx of essential thrombocytosis (baseline 350-570K from past admission), likely a risk factor for DVT/PE.  -c/w home hydroxyurea 500 mg PO q12
Pt with Hx of thrombocytosis (baseline 350-570K from past admission).  -c/w home hydroxyurea 500 mg PO Q12
resolving
resolving

## 2017-12-07 NOTE — PROGRESS NOTE ADULT - PROBLEM SELECTOR PROBLEM 1
Pulmonary embolus

## 2017-12-07 NOTE — PROGRESS NOTE ADULT - PROBLEM SELECTOR PLAN 5
Per HHA, pt no longer taking Hydralazine 10 mg. Pt normotensive since admission.  -Holding meds for now, will consider restarting if pt becomes hypertensive
Per HHA, pt no longer taking Hydralazine 10 mg. Pt normotensive since admission  -Holding meds for now, will consider restarting if pt becomes hypertensive
Per HHA, pt no longer taking Hydralazine 10 mg. Pt normotensive since admission  -Holding meds for now, will consider restarting if pt becomes hypertensive
Per HHA, pt no longer taking Hydralazine 10 mg. Pt normotensive since admission.  -Holding meds for now, will consider restarting if pt becomes hypertensive

## 2017-12-07 NOTE — PROGRESS NOTE ADULT - SUBJECTIVE AND OBJECTIVE BOX
OVERNIGHT EVENTS:    SUBJECTIVE / INTERVAL HPI: Patient seen and examined at bedside.     VITAL SIGNS:  Vital Signs Last 24 Hrs  T(C): 36.9 (07 Dec 2017 05:25), Max: 37.3 (06 Dec 2017 09:19)  T(F): 98.5 (07 Dec 2017 05:25), Max: 99.1 (06 Dec 2017 09:19)  HR: 74 (07 Dec 2017 05:25) (64 - 80)  BP: 126/72 (07 Dec 2017 05:25) (112/56 - 138/63)  BP(mean): 80 (06 Dec 2017 12:55) (80 - 91)  RR: 17 (07 Dec 2017 05:25) (17 - 26)  SpO2: 96% (07 Dec 2017 05:25) (90% - 99%)    PHYSICAL EXAM:    General: WDWN  HEENT: NC/AT; PERRL, anicteric sclera; MMM  Neck: supple  Cardiovascular: +S1/S2; RRR; no M/R/G on auscultation  Respiratory: CTA B/L; no W/R/R  Gastrointestinal: soft, NT/ND; +BSx4  Extremities: WWP; no edema, clubbing or cyanosis  Vascular: 2+ radial, DP/PT pulses B/L  Neurological: AAOx3; no focal deficits    MEDICATIONS:  MEDICATIONS  (STANDING):  apixaban 10 milliGRAM(s) Oral every 12 hours  aspirin  chewable 81 milliGRAM(s) Oral daily  docusate sodium 100 milliGRAM(s) Oral daily  hydroxyurea 500 milliGRAM(s) Oral every 12 hours  pantoprazole    Tablet 40 milliGRAM(s) Oral before breakfast    MEDICATIONS  (PRN):  temazepam 15 milliGRAM(s) Oral at bedtime PRN Insomnia      ALLERGIES:  Allergies    No Known Allergies    Intolerances        LABS:                        8.6    5.1   )-----------( 485      ( 06 Dec 2017 07:12 )             27.8     12-06    140  |  100  |  23  ----------------------------<  111<H>  4.0   |  30  |  1.12    Ca    8.3<L>      06 Dec 2017 07:12  Phos  3.3     12-06  Mg     2.0     12-06      PTT - ( 06 Dec 2017 07:12 )  PTT:33.2 sec    CAPILLARY BLOOD GLUCOSE          RADIOLOGY & ADDITIONAL TESTS: Reviewed.

## 2017-12-07 NOTE — PROGRESS NOTE ADULT - PROBLEM SELECTOR PROBLEM 4
Thrombocytosis
Acute respiratory failure
Acute respiratory failure

## 2017-12-07 NOTE — PROGRESS NOTE ADULT - PROBLEM SELECTOR PLAN 1
Pulmonary emboli are seen within lobar, segmental and subsegmental branches of each pulmonary lobe bilaterally, with evidence of right heart strain on CT PE and elevated troponin (0.09) and BNP (~37687). 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  - LE venous duplex revealed bilateral deep femoral veins DVT. Midportion of the left femoral vein DVT. Left popliteal vein DVT.  -ECHO with severe RV dilation and severe pulmonary HTN (83 mmHg)  -trops peaked at 0.10  -Lovenox switched to Eliquis, first dose PM 12/6 (10mg BID x7d). Will transition to 5mg BID after loading dose complete.  -Weaned of BIPAP to HFNC and now to NC O2, saturating well  - Maintain active T&S, monitor CBC as pt with high bleeding risk given recent GI bleed and EVAR  - PT recommending

## 2017-12-07 NOTE — PROGRESS NOTE ADULT - PROBLEM SELECTOR PROBLEM 2
Acute right heart failure

## 2017-12-07 NOTE — DISCHARGE NOTE ADULT - ADDITIONAL INSTRUCTIONS
Please follow up with pulmonology (Dr. Jacob) within 2 weeks of discharge.   Please follow up with your primary care provider within 1 week of discharge for management of your chronic conditions.   Please follow up with vascular surgery (Dr. White) BEFORE [place] for a follow up ultrasound to monitor the leak around your aneurysm repair. Please follow up with pulmonology (Dr. Jacob) within 2 weeks of discharge.   Please follow up with your primary care provider within 1 week of discharge for management of your chronic conditions.   Please follow up with vascular surgery (Dr. White) on MONDAY 12/11 in office for staple removal and follow up ultrasound to monitor the leak around your aneurysm repair.

## 2017-12-07 NOTE — PROGRESS NOTE ADULT - PROBLEM SELECTOR PLAN 3
- Vascular surgery assessed pt in ED, CT A/P showing small endoleak , recommend to anticoagulate, no IVC filter needed
- Vascular surgery assessed pt in ED, CT A/P showing small endoleak , recommend to anticoagulate, no IVC filter needed
Asymptomatic AAA found on last admission, now s/p EVAR on last admission. CT showing small endoleak.   - vascular consulted, recommend AC as low risk and no IVC filter needed for clot burden. will continue to f/u recs  - monitor CBC given risk for bleeding, transfuse to Hgb>7
Asymptomatic AAA found on last admission, now s/p EVAR on last admission. CT showing small endoleak.   - vascular consulted, recommend AC as low risk and no IVC filter needed for clot burden. will continue to f/u recs  - monitor CBC given risk for bleeding, transfuse to Hgb>7
due to PE
due to PE

## 2017-12-07 NOTE — DISCHARGE NOTE ADULT - PLAN OF CARE
Improvement in symptoms with outpatient follow up You came to the hospital with low oxygen while working with physical therapy and were found to have multiple blood clots in your lungs causing acute right heart failure. You were treated with blood thinners and have been prescribed an oral blood thinner (Eliquis) to help dissolve the clots and prevent further You came to the hospital with low oxygen while working with physical therapy and were found to have multiple blood clots in your lungs causing acute right heart failure. You were treated with blood thinners and have been prescribed an oral blood thinner (Eliquis) to help dissolve the clots and prevent further clots. You will need to complete an initial loading dose course of 10mg twice per day, with your next dose due TONIGHT. You will need to take this dose a total of 12 times, with the last dose at 10AM on 12/13. After this, you should only take ONE pill (5mg) twice per day starting with the next dose (evening of 12/13). You will need to continue this maintenance therapy for 6 months and follow up with pulmonology (Dr. Jacob) within 2 weeks of discharge for continued care and further refills of your Eliquis. We have already verified with your pharmacy that this medication is covered by your insurance, and the prescriptions have been sent. You have a history of an aortic aneurysm which was recently repaired. On your CT scan, you were noted to have a leak around the stent that was placed in the aneurysm called an endoleak. This endoleak is not a high risk finding and it has a very, very minimal risk of rupture so can be followed with surveillance imaging as an outpatient with vascular surgery. You should be sure to follow up with Dr. White (vascular surgery) for an ultrasound BEFORE [place]. You have a history of an aortic aneurysm which was recently repaired. On your CT scan, you were noted to have a leak around the stent that was placed in the aneurysm called an endoleak. This endoleak is not a high risk finding and it has a very, very minimal risk of rupture so can be followed with surveillance imaging as an outpatient with vascular surgery. You should be sure to follow up with Dr. White (vascular surgery) for an ultrasound BEFORE [place]. Please continue your home medication for your thrombocytosis. This is likely what put you at risk for blood clots in your lungs. Please continue to follow up with your doctor for continued management. You have a history of high blood pressure, however your blood pressures have been well controlled without blood pressure medications while you were in the hospital. We have not continued you on any blood pressure medication. Please follow up with your primary care provider for continued management. Please continue with your aspirin and follow up with your primary care provider. Please continue your home medications for sleep and depression, and follow up with your outpatient provider for any changes. You have a history of an aortic aneurysm which was recently repaired. On your CT scan, you were noted to have a leak around the stent that was placed in the aneurysm called an endoleak. This endoleak is not a high risk finding and it has a very, very minimal risk of rupture so can be followed with surveillance imaging as an outpatient with vascular surgery. You should be sure to follow up with Dr. White (vascular surgery) for an ultrasound and staple removal on MONDAY 12/11. You have a history of an aortic aneurysm which was recently repaired. On your CT scan, you were noted to have a leak around the stent that was placed in the aneurysm called an endoleak. This endoleak is not a high risk finding and it has a very, very minimal risk of rupture so can be followed with surveillance imaging as an outpatient with vascular surgery. You should be sure to follow up with Dr. White (vascular surgery) for an ultrasound.

## 2017-12-07 NOTE — PROGRESS NOTE ADULT - PROVIDER SPECIALTY LIST ADULT
Internal Medicine
Pulmonology
Vascular Surgery
Pulmonology
Internal Medicine

## 2017-12-07 NOTE — DIETITIAN INITIAL EVALUATION ADULT. - OTHER INFO
89 y/o male admitted with PE. Eating 75% of trays. No N/V/D or pain complaints  .Skin intact. No specific restrictions PTA.

## 2017-12-07 NOTE — PROGRESS NOTE ADULT - PROBLEM SELECTOR PLAN 2
Evidence of right heart strain on CT PE and elevated troponin (0.09) and BNP (~95400). EKG with sinus rhythm at 88, ST wave inversion in anterior lateral leads (II, III, aVF, V3-V5). Pt relatively hypotensive compared to prior admission.  - pt currently improved, euvolemic on exam

## 2017-12-07 NOTE — DISCHARGE NOTE ADULT - CARE PROVIDERS DIRECT ADDRESSES
,DirectAddress_Unknown,[email or web address]

## 2017-12-07 NOTE — PROGRESS NOTE ADULT - PROBLEM SELECTOR PROBLEM 3
Abdominal aortic aneurysm (AAA) without rupture
Pulmonary hypertension
Pulmonary hypertension

## 2017-12-07 NOTE — DISCHARGE NOTE ADULT - SECONDARY DIAGNOSIS.
Abdominal aortic aneurysm (AAA) without rupture Endoleak post endovascular aneurysm repair, initial encounter Thrombocytosis Hypertension CAD (coronary artery disease) Insomnia

## 2017-12-07 NOTE — PROGRESS NOTE ADULT - ASSESSMENT
89 yo M with PMH of CAD s/p stent (>10 years ago), moderate AS, essential thrombocytosis (on hydroxyurea), AAA s/p EVAR on 11/16/17, and hx of GI bleed 3 weeks ago, presents with CALDWELL, found to have bilateral PE and small endoleak. Initially admitted to [facility] on BIPAP now stable on NC O2 and transitioned to Eliquis, stepped down to [facility] on 12/6.

## 2017-12-07 NOTE — PROGRESS NOTE ADULT - ATTENDING COMMENTS
Patient seen and examined with house-staff during bedside rounds  Resident note read, including vitals, physical findings, laboratory data, and radiological reports.   Revisions included below.  Case discussed with House staff  Direct personal management at bedside  and extensive interpretation of data. Decision making of high complexity.
Patient was seen and examined by me at bedside. I agree with resident's note, subjective, objective physical exam, assessment and plan with following modifications/additions.     1) Submassive PE, provoked.   2) Bilateral LE DVTs.  3) Hypoxia  4) Acute right heart failure  5) Thrombocytosis  6) AAA repair  7) Essential HTN    Plan: Medically ready for discharge on Eliquis. Will go home on home O2. Surgery to remove stitches from surgery. Needs to f/u with PCP and Dr. Jacob as outpatient.
patient will be seen in the office and will follow on echocardiogram in three months. I will attempt to me the patient off oxygen as an outpatient

## 2017-12-07 NOTE — CHART NOTE - NSCHARTNOTEFT_GEN_A_CORE
Vascular team called to see pt just before discharge to see if staples ready for removal. Groin incisions with staples bilaterally. Skin not fully apposed for both wounds. F/u with Dr. White in office on 12/11/17 for staple removal. Call  for appointment.

## 2017-12-07 NOTE — DISCHARGE NOTE ADULT - CARE PLAN
Principal Discharge DX:	Other acute pulmonary embolism with acute cor pulmonale  Goal:	Improvement in symptoms with outpatient follow up  Instructions for follow-up, activity and diet:	You came to the hospital with low oxygen while working with physical therapy and were found to have multiple blood clots in your lungs causing acute right heart failure. You were treated with blood thinners and have been prescribed an oral blood thinner (Eliquis) to help dissolve the clots and prevent further  Secondary Diagnosis:	Abdominal aortic aneurysm (AAA) without rupture  Secondary Diagnosis:	Endoleak post endovascular aneurysm repair, initial encounter  Secondary Diagnosis:	Thrombocytosis  Secondary Diagnosis:	Hypertension  Secondary Diagnosis:	CAD (coronary artery disease)  Secondary Diagnosis:	Insomnia Principal Discharge DX:	Other acute pulmonary embolism with acute cor pulmonale  Goal:	Improvement in symptoms with outpatient follow up  Instructions for follow-up, activity and diet:	You came to the hospital with low oxygen while working with physical therapy and were found to have multiple blood clots in your lungs causing acute right heart failure. You were treated with blood thinners and have been prescribed an oral blood thinner (Eliquis) to help dissolve the clots and prevent further clots. You will need to complete an initial loading dose course of 10mg twice per day, with your next dose due TONIGHT. You will need to take this dose a total of 12 times, with the last dose at 10AM on 12/13. After this, you should only take ONE pill (5mg) twice per day starting with the next dose (evening of 12/13). You will need to continue this maintenance therapy for 6 months and follow up with pulmonology (Dr. Jacob) within 2 weeks of discharge for continued care and further refills of your Eliquis. We have already verified with your pharmacy that this medication is covered by your insurance, and the prescriptions have been sent.  Secondary Diagnosis:	Abdominal aortic aneurysm (AAA) without rupture  Instructions for follow-up, activity and diet:	You have a history of an aortic aneurysm which was recently repaired. On your CT scan, you were noted to have a leak around the stent that was placed in the aneurysm called an endoleak. This endoleak is not a high risk finding and it has a very, very minimal risk of rupture so can be followed with surveillance imaging as an outpatient with vascular surgery. You should be sure to follow up with Dr. White (vascular surgery) for an ultrasound BEFORE [place].  Secondary Diagnosis:	Endoleak post endovascular aneurysm repair, initial encounter  Instructions for follow-up, activity and diet:	You have a history of an aortic aneurysm which was recently repaired. On your CT scan, you were noted to have a leak around the stent that was placed in the aneurysm called an endoleak. This endoleak is not a high risk finding and it has a very, very minimal risk of rupture so can be followed with surveillance imaging as an outpatient with vascular surgery. You should be sure to follow up with Dr. White (vascular surgery) for an ultrasound BEFORE [place].  Secondary Diagnosis:	Thrombocytosis  Instructions for follow-up, activity and diet:	Please continue your home medication for your thrombocytosis. This is likely what put you at risk for blood clots in your lungs. Please continue to follow up with your doctor for continued management.  Secondary Diagnosis:	Hypertension  Instructions for follow-up, activity and diet:	You have a history of high blood pressure, however your blood pressures have been well controlled without blood pressure medications while you were in the hospital. We have not continued you on any blood pressure medication. Please follow up with your primary care provider for continued management.  Secondary Diagnosis:	CAD (coronary artery disease)  Instructions for follow-up, activity and diet:	Please continue with your aspirin and follow up with your primary care provider.  Secondary Diagnosis:	Insomnia  Instructions for follow-up, activity and diet:	Please continue your home medications for sleep and depression, and follow up with your outpatient provider for any changes. Principal Discharge DX:	Other acute pulmonary embolism with acute cor pulmonale  Goal:	Improvement in symptoms with outpatient follow up  Instructions for follow-up, activity and diet:	You came to the hospital with low oxygen while working with physical therapy and were found to have multiple blood clots in your lungs causing acute right heart failure. You were treated with blood thinners and have been prescribed an oral blood thinner (Eliquis) to help dissolve the clots and prevent further clots. You will need to complete an initial loading dose course of 10mg twice per day, with your next dose due TONIGHT. You will need to take this dose a total of 12 times, with the last dose at 10AM on 12/13. After this, you should only take ONE pill (5mg) twice per day starting with the next dose (evening of 12/13). You will need to continue this maintenance therapy for 6 months and follow up with pulmonology (Dr. Jacob) within 2 weeks of discharge for continued care and further refills of your Eliquis. We have already verified with your pharmacy that this medication is covered by your insurance, and the prescriptions have been sent.  Secondary Diagnosis:	Abdominal aortic aneurysm (AAA) without rupture  Instructions for follow-up, activity and diet:	You have a history of an aortic aneurysm which was recently repaired. On your CT scan, you were noted to have a leak around the stent that was placed in the aneurysm called an endoleak. This endoleak is not a high risk finding and it has a very, very minimal risk of rupture so can be followed with surveillance imaging as an outpatient with vascular surgery. You should be sure to follow up with Dr. White (vascular surgery) for an ultrasound and staple removal on MONDAY 12/11.  Secondary Diagnosis:	Endoleak post endovascular aneurysm repair, initial encounter  Instructions for follow-up, activity and diet:	You have a history of an aortic aneurysm which was recently repaired. On your CT scan, you were noted to have a leak around the stent that was placed in the aneurysm called an endoleak. This endoleak is not a high risk finding and it has a very, very minimal risk of rupture so can be followed with surveillance imaging as an outpatient with vascular surgery. You should be sure to follow up with Dr. White (vascular surgery) for an ultrasound.  Secondary Diagnosis:	Thrombocytosis  Instructions for follow-up, activity and diet:	Please continue your home medication for your thrombocytosis. This is likely what put you at risk for blood clots in your lungs. Please continue to follow up with your doctor for continued management.  Secondary Diagnosis:	Hypertension  Instructions for follow-up, activity and diet:	You have a history of high blood pressure, however your blood pressures have been well controlled without blood pressure medications while you were in the hospital. We have not continued you on any blood pressure medication. Please follow up with your primary care provider for continued management.  Secondary Diagnosis:	CAD (coronary artery disease)  Instructions for follow-up, activity and diet:	Please continue with your aspirin and follow up with your primary care provider.  Secondary Diagnosis:	Insomnia  Instructions for follow-up, activity and diet:	Please continue your home medications for sleep and depression, and follow up with your outpatient provider for any changes.

## 2017-12-11 DIAGNOSIS — E78.5 HYPERLIPIDEMIA, UNSPECIFIED: ICD-10-CM

## 2017-12-11 DIAGNOSIS — R09.02 HYPOXEMIA: ICD-10-CM

## 2017-12-11 DIAGNOSIS — D47.3 ESSENTIAL (HEMORRHAGIC) THROMBOCYTHEMIA: ICD-10-CM

## 2017-12-11 DIAGNOSIS — Z95.5 PRESENCE OF CORONARY ANGIOPLASTY IMPLANT AND GRAFT: ICD-10-CM

## 2017-12-11 DIAGNOSIS — G47.00 INSOMNIA, UNSPECIFIED: ICD-10-CM
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
## 2017-12-11 DIAGNOSIS — I82.413 ACUTE EMBOLISM AND THROMBOSIS OF FEMORAL VEIN, BILATERAL: ICD-10-CM

## 2017-12-11 DIAGNOSIS — Z79.82 LONG TERM (CURRENT) USE OF ASPIRIN: ICD-10-CM

## 2017-12-11 DIAGNOSIS — J96.00 ACUTE RESPIRATORY FAILURE, UNSPECIFIED WHETHER WITH HYPOXIA OR HYPERCAPNIA: ICD-10-CM

## 2017-12-11 DIAGNOSIS — K21.9 GASTRO-ESOPHAGEAL REFLUX DISEASE WITHOUT ESOPHAGITIS: ICD-10-CM

## 2017-12-11 DIAGNOSIS — J44.9 CHRONIC OBSTRUCTIVE PULMONARY DISEASE, UNSPECIFIED: ICD-10-CM

## 2017-12-11 DIAGNOSIS — I26.09 OTHER PULMONARY EMBOLISM WITH ACUTE COR PULMONALE: ICD-10-CM
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
## 2017-12-11 DIAGNOSIS — G89.29 OTHER CHRONIC PAIN: ICD-10-CM

## 2017-12-11 DIAGNOSIS — I50.811 ACUTE RIGHT HEART FAILURE: ICD-10-CM

## 2017-12-11 DIAGNOSIS — Z87.891 PERSONAL HISTORY OF NICOTINE DEPENDENCE: ICD-10-CM

## 2017-12-11 DIAGNOSIS — I25.10 ATHEROSCLEROTIC HEART DISEASE OF NATIVE CORONARY ARTERY WITHOUT ANGINA PECTORIS: ICD-10-CM

## 2017-12-11 DIAGNOSIS — I11.0 HYPERTENSIVE HEART DISEASE WITH HEART FAILURE: ICD-10-CM

## 2017-12-11 DIAGNOSIS — T82.538A LEAKAGE OF OTHER CARDIAC AND VASCULAR DEVICES AND IMPLANTS, INITIAL ENCOUNTER: ICD-10-CM

## 2017-12-11 DIAGNOSIS — F32.9 MAJOR DEPRESSIVE DISORDER, SINGLE EPISODE, UNSPECIFIED: ICD-10-CM

## 2017-12-12 ENCOUNTER — APPOINTMENT (OUTPATIENT)
Dept: PSYCHIATRY | Facility: CLINIC | Age: 82
End: 2017-12-12

## 2017-12-19 PROCEDURE — 36415 COLL VENOUS BLD VENIPUNCTURE: CPT

## 2017-12-19 PROCEDURE — 85027 COMPLETE CBC AUTOMATED: CPT

## 2017-12-19 PROCEDURE — 84484 ASSAY OF TROPONIN QUANT: CPT

## 2017-12-19 PROCEDURE — 83550 IRON BINDING TEST: CPT

## 2017-12-19 PROCEDURE — 86850 RBC ANTIBODY SCREEN: CPT

## 2017-12-19 PROCEDURE — 94660 CPAP INITIATION&MGMT: CPT

## 2017-12-19 PROCEDURE — 85610 PROTHROMBIN TIME: CPT

## 2017-12-19 PROCEDURE — 85730 THROMBOPLASTIN TIME PARTIAL: CPT

## 2017-12-19 PROCEDURE — 93970 EXTREMITY STUDY: CPT

## 2017-12-19 PROCEDURE — 96374 THER/PROPH/DIAG INJ IV PUSH: CPT

## 2017-12-19 PROCEDURE — 93005 ELECTROCARDIOGRAM TRACING: CPT

## 2017-12-19 PROCEDURE — 85025 COMPLETE CBC W/AUTO DIFF WBC: CPT

## 2017-12-19 PROCEDURE — 36430 TRANSFUSION BLD/BLD COMPNT: CPT

## 2017-12-19 PROCEDURE — 80053 COMPREHEN METABOLIC PANEL: CPT

## 2017-12-19 PROCEDURE — 86900 BLOOD TYPING SEROLOGIC ABO: CPT

## 2017-12-19 PROCEDURE — 97530 THERAPEUTIC ACTIVITIES: CPT

## 2017-12-19 PROCEDURE — 74174 CTA ABD&PLVS W/CONTRAST: CPT

## 2017-12-19 PROCEDURE — 80048 BASIC METABOLIC PNL TOTAL CA: CPT

## 2017-12-19 PROCEDURE — 71275 CT ANGIOGRAPHY CHEST: CPT

## 2017-12-19 PROCEDURE — 99291 CRITICAL CARE FIRST HOUR: CPT | Mod: 25

## 2017-12-19 PROCEDURE — 83690 ASSAY OF LIPASE: CPT

## 2017-12-19 PROCEDURE — 83735 ASSAY OF MAGNESIUM: CPT

## 2017-12-19 PROCEDURE — 96374 THER/PROPH/DIAG INJ IV PUSH: CPT | Mod: XU

## 2017-12-19 PROCEDURE — 86901 BLOOD TYPING SEROLOGIC RH(D): CPT

## 2017-12-19 PROCEDURE — 99285 EMERGENCY DEPT VISIT HI MDM: CPT | Mod: 25

## 2017-12-19 PROCEDURE — 71045 X-RAY EXAM CHEST 1 VIEW: CPT

## 2017-12-19 PROCEDURE — 83880 ASSAY OF NATRIURETIC PEPTIDE: CPT

## 2017-12-19 PROCEDURE — 82746 ASSAY OF FOLIC ACID SERUM: CPT

## 2017-12-19 PROCEDURE — 96375 TX/PRO/DX INJ NEW DRUG ADDON: CPT | Mod: XU

## 2017-12-19 PROCEDURE — 93306 TTE W/DOPPLER COMPLETE: CPT

## 2017-12-19 PROCEDURE — 82607 VITAMIN B-12: CPT

## 2017-12-19 PROCEDURE — 82803 BLOOD GASES ANY COMBINATION: CPT

## 2017-12-19 PROCEDURE — 82728 ASSAY OF FERRITIN: CPT

## 2017-12-19 PROCEDURE — 86923 COMPATIBILITY TEST ELECTRIC: CPT

## 2017-12-19 PROCEDURE — 97161 PT EVAL LOW COMPLEX 20 MIN: CPT

## 2017-12-19 PROCEDURE — P9016: CPT

## 2017-12-19 PROCEDURE — 84100 ASSAY OF PHOSPHORUS: CPT

## 2017-12-22 ENCOUNTER — APPOINTMENT (OUTPATIENT)
Dept: VASCULAR SURGERY | Facility: CLINIC | Age: 82
End: 2017-12-22
Payer: MEDICARE

## 2017-12-22 PROCEDURE — 93978 VASCULAR STUDY: CPT | Mod: 58

## 2017-12-22 PROCEDURE — 99024 POSTOP FOLLOW-UP VISIT: CPT

## 2018-01-25 ENCOUNTER — APPOINTMENT (OUTPATIENT)
Dept: PSYCHIATRY | Facility: CLINIC | Age: 83
End: 2018-01-25

## 2018-01-30 NOTE — ED PROVIDER NOTE - NS ED MD DISPO DIVISION
January 30, 2018     Patient: Ania Horton   YOB: 1986   Date of Visit: 1/30/2018       To Whom it May Concern:    Ania Horton is under my professional care  He was seen in my office on 1/30/2018  He may return to work on 1/30/2018  He is able to perform sedentary duty  His restriction is no lifting, pushing or pulling greater than 5 lb with his left arm  He has no restrictions with the right arm  If you have any questions or concerns, please don't hesitate to call           Sincerely,          Daly Wick MD        CC: No Recipients
[facility]

## 2018-04-24 ENCOUNTER — INPATIENT (INPATIENT)
Facility: HOSPITAL | Age: 83
LOS: 7 days | Discharge: HOME | DRG: 315 | End: 2018-05-02
Attending: SURGERY | Admitting: SURGERY
Payer: MEDICARE

## 2018-04-24 VITALS
TEMPERATURE: 101 F | DIASTOLIC BLOOD PRESSURE: 62 MMHG | OXYGEN SATURATION: 100 % | RESPIRATION RATE: 18 BRPM | HEART RATE: 104 BPM | SYSTOLIC BLOOD PRESSURE: 100 MMHG | WEIGHT: 175.05 LBS

## 2018-04-24 DIAGNOSIS — T82.330A LEAKAGE OF AORTIC (BIFURCATION) GRAFT (REPLACEMENT), INITIAL ENCOUNTER: Chronic | ICD-10-CM

## 2018-04-24 DIAGNOSIS — Z98.890 OTHER SPECIFIED POSTPROCEDURAL STATES: Chronic | ICD-10-CM

## 2018-04-24 DIAGNOSIS — Z90.49 ACQUIRED ABSENCE OF OTHER SPECIFIED PARTS OF DIGESTIVE TRACT: Chronic | ICD-10-CM

## 2018-04-24 DIAGNOSIS — Z95.5 PRESENCE OF CORONARY ANGIOPLASTY IMPLANT AND GRAFT: Chronic | ICD-10-CM

## 2018-04-24 LAB
ALBUMIN SERPL ELPH-MCNC: 3.3 G/DL — SIGNIFICANT CHANGE UP (ref 3.3–5)
ALP SERPL-CCNC: 71 U/L — SIGNIFICANT CHANGE UP (ref 40–120)
ALT FLD-CCNC: 7 U/L — LOW (ref 10–45)
ANION GAP SERPL CALC-SCNC: 10 MMOL/L — SIGNIFICANT CHANGE UP (ref 5–17)
APPEARANCE UR: CLEAR — SIGNIFICANT CHANGE UP
APTT BLD: 37.6 SEC — HIGH (ref 27.5–37.4)
AST SERPL-CCNC: 19 U/L — SIGNIFICANT CHANGE UP (ref 10–40)
BASE EXCESS BLDV CALC-SCNC: 6.9 MMOL/L — SIGNIFICANT CHANGE UP
BASOPHILS NFR BLD AUTO: 0.4 % — SIGNIFICANT CHANGE UP (ref 0–2)
BILIRUB SERPL-MCNC: 0.3 MG/DL — SIGNIFICANT CHANGE UP (ref 0.2–1.2)
BILIRUB UR-MCNC: NEGATIVE — SIGNIFICANT CHANGE UP
BLD GP AB SCN SERPL QL: NEGATIVE — SIGNIFICANT CHANGE UP
BUN SERPL-MCNC: 20 MG/DL — SIGNIFICANT CHANGE UP (ref 7–23)
CALCIUM SERPL-MCNC: 8.7 MG/DL — SIGNIFICANT CHANGE UP (ref 8.4–10.5)
CHLORIDE SERPL-SCNC: 94 MMOL/L — LOW (ref 96–108)
CO2 SERPL-SCNC: 30 MMOL/L — SIGNIFICANT CHANGE UP (ref 22–31)
COLOR SPEC: YELLOW — SIGNIFICANT CHANGE UP
CREAT SERPL-MCNC: 1.1 MG/DL — SIGNIFICANT CHANGE UP (ref 0.5–1.3)
DIFF PNL FLD: NEGATIVE — SIGNIFICANT CHANGE UP
EOSINOPHIL NFR BLD AUTO: 2.3 % — SIGNIFICANT CHANGE UP (ref 0–6)
GAS PNL BLDV: SIGNIFICANT CHANGE UP
GLUCOSE SERPL-MCNC: 138 MG/DL — HIGH (ref 70–99)
GLUCOSE UR QL: NEGATIVE — SIGNIFICANT CHANGE UP
HCO3 BLDV-SCNC: 33 MMOL/L — HIGH (ref 20–27)
HCT VFR BLD CALC: 24.8 % — LOW (ref 39–50)
HGB BLD-MCNC: 8.2 G/DL — LOW (ref 13–17)
INR BLD: 1.25 — HIGH (ref 0.88–1.16)
KETONES UR-MCNC: NEGATIVE — SIGNIFICANT CHANGE UP
LACTATE SERPL-SCNC: 1.6 MMOL/L — SIGNIFICANT CHANGE UP (ref 0.5–2)
LEUKOCYTE ESTERASE UR-ACNC: NEGATIVE — SIGNIFICANT CHANGE UP
LIDOCAIN IGE QN: 30 U/L — SIGNIFICANT CHANGE UP (ref 7–60)
LYMPHOCYTES # BLD AUTO: 6.4 % — LOW (ref 13–44)
MCHC RBC-ENTMCNC: 33.1 G/DL — SIGNIFICANT CHANGE UP (ref 32–36)
MCHC RBC-ENTMCNC: 39.8 PG — HIGH (ref 27–34)
MCV RBC AUTO: 120.4 FL — HIGH (ref 80–100)
MONOCYTES NFR BLD AUTO: 8.9 % — SIGNIFICANT CHANGE UP (ref 2–14)
NEUTROPHILS NFR BLD AUTO: 82 % — HIGH (ref 43–77)
NITRITE UR-MCNC: NEGATIVE — SIGNIFICANT CHANGE UP
PCO2 BLDV: 54 MMHG — HIGH (ref 41–51)
PH BLDV: 7.4 — SIGNIFICANT CHANGE UP (ref 7.32–7.43)
PH UR: 7.5 — SIGNIFICANT CHANGE UP (ref 5–8)
PLATELET # BLD AUTO: 347 K/UL — SIGNIFICANT CHANGE UP (ref 150–400)
PO2 BLDV: 21 MMHG — SIGNIFICANT CHANGE UP
POTASSIUM SERPL-MCNC: 4.7 MMOL/L — SIGNIFICANT CHANGE UP (ref 3.5–5.3)
POTASSIUM SERPL-SCNC: 4.7 MMOL/L — SIGNIFICANT CHANGE UP (ref 3.5–5.3)
PROT SERPL-MCNC: 6.8 G/DL — SIGNIFICANT CHANGE UP (ref 6–8.3)
PROT UR-MCNC: NEGATIVE MG/DL — SIGNIFICANT CHANGE UP
PROTHROM AB SERPL-ACNC: 13.9 SEC — HIGH (ref 9.8–12.7)
RBC # BLD: 2.06 M/UL — LOW (ref 4.2–5.8)
RBC # FLD: 11.9 % — SIGNIFICANT CHANGE UP (ref 10.3–16.9)
RH IG SCN BLD-IMP: POSITIVE — SIGNIFICANT CHANGE UP
SAO2 % BLDV: 29 % — SIGNIFICANT CHANGE UP
SODIUM SERPL-SCNC: 134 MMOL/L — LOW (ref 135–145)
SP GR SPEC: 1.01 — SIGNIFICANT CHANGE UP (ref 1–1.03)
TROPONIN T SERPL-MCNC: 0.05 NG/ML — CRITICAL HIGH (ref 0–0.01)
UROBILINOGEN FLD QL: 0.2 E.U./DL — SIGNIFICANT CHANGE UP
WBC # BLD: 5.2 K/UL — SIGNIFICANT CHANGE UP (ref 3.8–10.5)
WBC # FLD AUTO: 5.2 K/UL — SIGNIFICANT CHANGE UP (ref 3.8–10.5)

## 2018-04-24 PROCEDURE — 93010 ELECTROCARDIOGRAM REPORT: CPT

## 2018-04-24 PROCEDURE — 99291 CRITICAL CARE FIRST HOUR: CPT

## 2018-04-24 PROCEDURE — 74174 CTA ABD&PLVS W/CONTRAST: CPT | Mod: 26

## 2018-04-24 PROCEDURE — 99232 SBSQ HOSP IP/OBS MODERATE 35: CPT

## 2018-04-24 PROCEDURE — 71045 X-RAY EXAM CHEST 1 VIEW: CPT | Mod: 26

## 2018-04-24 PROCEDURE — 99234 HOSP IP/OBS SM DT SF/LOW 45: CPT

## 2018-04-24 RX ORDER — APIXABAN 2.5 MG/1
5 TABLET, FILM COATED ORAL ONCE
Qty: 0 | Refills: 0 | Status: COMPLETED | OUTPATIENT
Start: 2018-04-24 | End: 2018-04-24

## 2018-04-24 RX ORDER — HYDROXYUREA 500 MG/1
500 CAPSULE ORAL EVERY 12 HOURS
Qty: 0 | Refills: 0 | Status: DISCONTINUED | OUTPATIENT
Start: 2018-04-24 | End: 2018-05-02

## 2018-04-24 RX ORDER — SODIUM CHLORIDE 9 MG/ML
3 INJECTION INTRAMUSCULAR; INTRAVENOUS; SUBCUTANEOUS ONCE
Qty: 0 | Refills: 0 | Status: COMPLETED | OUTPATIENT
Start: 2018-04-24 | End: 2018-04-24

## 2018-04-24 RX ORDER — ESCITALOPRAM OXALATE 10 MG/1
1 TABLET, FILM COATED ORAL
Qty: 0 | Refills: 0 | COMMUNITY

## 2018-04-24 RX ORDER — DOCUSATE SODIUM 100 MG
0 CAPSULE ORAL
Qty: 0 | Refills: 0 | COMMUNITY

## 2018-04-24 RX ORDER — SODIUM CHLORIDE 9 MG/ML
1000 INJECTION INTRAMUSCULAR; INTRAVENOUS; SUBCUTANEOUS ONCE
Qty: 0 | Refills: 0 | Status: COMPLETED | OUTPATIENT
Start: 2018-04-24 | End: 2018-04-24

## 2018-04-24 RX ORDER — PANTOPRAZOLE SODIUM 20 MG/1
40 TABLET, DELAYED RELEASE ORAL
Qty: 0 | Refills: 0 | Status: DISCONTINUED | OUTPATIENT
Start: 2018-04-24 | End: 2018-05-02

## 2018-04-24 RX ORDER — TEMAZEPAM 15 MG/1
30 CAPSULE ORAL AT BEDTIME
Qty: 0 | Refills: 0 | Status: DISCONTINUED | OUTPATIENT
Start: 2018-04-24 | End: 2018-05-01

## 2018-04-24 RX ORDER — TEMAZEPAM 15 MG/1
2 CAPSULE ORAL
Qty: 0 | Refills: 0 | COMMUNITY

## 2018-04-24 RX ORDER — TEMAZEPAM 15 MG/1
15 CAPSULE ORAL AT BEDTIME
Qty: 0 | Refills: 0 | Status: DISCONTINUED | OUTPATIENT
Start: 2018-04-24 | End: 2018-04-24

## 2018-04-24 RX ORDER — ESCITALOPRAM OXALATE 10 MG/1
10 TABLET, FILM COATED ORAL DAILY
Qty: 0 | Refills: 0 | Status: DISCONTINUED | OUTPATIENT
Start: 2018-04-24 | End: 2018-05-02

## 2018-04-24 RX ORDER — VANCOMYCIN HCL 1 G
1000 VIAL (EA) INTRAVENOUS ONCE
Qty: 0 | Refills: 0 | Status: COMPLETED | OUTPATIENT
Start: 2018-04-24 | End: 2018-04-24

## 2018-04-24 RX ORDER — PIPERACILLIN AND TAZOBACTAM 4; .5 G/20ML; G/20ML
3.38 INJECTION, POWDER, LYOPHILIZED, FOR SOLUTION INTRAVENOUS ONCE
Qty: 0 | Refills: 0 | Status: COMPLETED | OUTPATIENT
Start: 2018-04-24 | End: 2018-04-24

## 2018-04-24 RX ORDER — DOCUSATE SODIUM 100 MG
100 CAPSULE ORAL THREE TIMES A DAY
Qty: 0 | Refills: 0 | Status: DISCONTINUED | OUTPATIENT
Start: 2018-04-24 | End: 2018-05-02

## 2018-04-24 RX ORDER — PIPERACILLIN AND TAZOBACTAM 4; .5 G/20ML; G/20ML
2.25 INJECTION, POWDER, LYOPHILIZED, FOR SOLUTION INTRAVENOUS EVERY 6 HOURS
Qty: 0 | Refills: 0 | Status: DISCONTINUED | OUTPATIENT
Start: 2018-04-24 | End: 2018-05-01

## 2018-04-24 RX ORDER — VANCOMYCIN HCL 1 G
1000 VIAL (EA) INTRAVENOUS EVERY 24 HOURS
Qty: 0 | Refills: 0 | Status: DISCONTINUED | OUTPATIENT
Start: 2018-04-24 | End: 2018-04-26

## 2018-04-24 RX ORDER — ACETAMINOPHEN 500 MG
650 TABLET ORAL ONCE
Qty: 0 | Refills: 0 | Status: COMPLETED | OUTPATIENT
Start: 2018-04-24 | End: 2018-04-24

## 2018-04-24 RX ORDER — ASPIRIN/CALCIUM CARB/MAGNESIUM 324 MG
81 TABLET ORAL DAILY
Qty: 0 | Refills: 0 | Status: DISCONTINUED | OUTPATIENT
Start: 2018-04-24 | End: 2018-05-01

## 2018-04-24 RX ORDER — APIXABAN 2.5 MG/1
5 TABLET, FILM COATED ORAL EVERY 12 HOURS
Qty: 0 | Refills: 0 | Status: DISCONTINUED | OUTPATIENT
Start: 2018-04-24 | End: 2018-04-25

## 2018-04-24 RX ADMIN — PIPERACILLIN AND TAZOBACTAM 200 GRAM(S): 4; .5 INJECTION, POWDER, LYOPHILIZED, FOR SOLUTION INTRAVENOUS at 21:31

## 2018-04-24 RX ADMIN — SODIUM CHLORIDE 1000 MILLILITER(S): 9 INJECTION INTRAMUSCULAR; INTRAVENOUS; SUBCUTANEOUS at 21:32

## 2018-04-24 RX ADMIN — Medication 250 MILLIGRAM(S): at 21:32

## 2018-04-24 RX ADMIN — SODIUM CHLORIDE 3 MILLILITER(S): 9 INJECTION INTRAMUSCULAR; INTRAVENOUS; SUBCUTANEOUS at 21:31

## 2018-04-24 RX ADMIN — TEMAZEPAM 30 MILLIGRAM(S): 15 CAPSULE ORAL at 23:33

## 2018-04-24 RX ADMIN — APIXABAN 5 MILLIGRAM(S): 2.5 TABLET, FILM COATED ORAL at 23:32

## 2018-04-24 NOTE — ED ADULT NURSE NOTE - CHPI ED SYMPTOMS NEG
no diarrhea/no dysuria/no nausea/no abdominal distension/no vomiting/no blood in stool/no hematuria/no burning urination

## 2018-04-24 NOTE — ED PROVIDER NOTE - PHYSICAL EXAMINATION
gen: elderly pleasant, conversant no acute distress, comfortable,   HEENT: oropharynx clear  Neck: supple, no meningismus,  CV: rrr   Resp: ctab, no w/c/r  Abd: mild ttp lower abd, no rebound/guarding, pt with bruising to lower abd  Ext: no edema  Neuro: alert and oriented, moves all 4 ext equally

## 2018-04-24 NOTE — H&P ADULT - NSHPLABSRESULTS_GEN_ALL_CORE
CTA Abd/Pel  IMPRESSION:  1. Since 12/4/2017, there is again an endoleak near the proximal portion   of the right common iliac graft in this patient post aortobiiliac stent   grafts. The amount of contrast in the excluded lumen is greater on this   study, but the aneurysm has only mildly increased in size.    2. 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

## 2018-04-24 NOTE — ED PROVIDER NOTE - PROGRESS NOTE DETAILS
pt upgraded by me for sepsis concern with fever, tachycardia, abdominal pain. Hx of AAA repair and bruising to lower groin bl. Will page vascular surg stat. Spoke to Vascular PA, appreciate consultation, will expedite scan and will defer immediately creatinine given emergent concern for endoleak and unstable pt. Pt is at CT scanner, nl creatinine as of < 6 mos ago, will waive creatinine at this time and hydrate. Discussed with radiology, concern for endoleak at bifurcation, vascular surgery with radiology. Pt is comfortable well appearing vs remain wnl.

## 2018-04-24 NOTE — ED PROVIDER NOTE - MEDICAL DECISION MAKING DETAILS
89M with concern for endoleak s/p AAA repair presenting with abdominal pain, fever. Pt upgraded by me for concern for AAA repair, plan for IV abx and vascular consultation for possible surgery.

## 2018-04-24 NOTE — H&P ADULT - HISTORY OF PRESENT ILLNESS
Pt is a 89 M Hx of CAD x2 s/p stents, HLD, HTN, AS, GI bleed, PE on Eliquis, AAA (s/p EVAR 11/16/17) who presented to [facility] ED with complain of fever, chills and left groin erythema x 1day and abdominal pain x2wks.  Pt has been having intermittent abdominal pain for about 2wks and today he told his home health aid that he "didn't feel good" and at the time was noted to have fever of 100.8, chills and his left groin EVAR incision site was noted to be erythematous and so patient was brought to the [facility] ED. According Pt's health aid, he appears a little bit more distended and was nauseous earlier today, but denies diarrhea, constipation, vomiting, chest pain, SOB, dizziness, or dysuria.    Upon arrival to ED pt /62, , Temp 100.9.  Pt received, 1L fluid bolus and responded to fluid bolus. Pt is a 89 M Hx of CAD x2 s/p stents, HLD, HTN, AS, GI bleed, essential thrombocytosis, PE on Eliquis, AAA (s/p EVAR 11/16/17) who presented to [facility] ED with complain of fever, chills and left groin erythema x 1day and abdominal pain x2wks.  Pt has been having intermittent abdominal pain for about 2wks and today he told his home health aid that he "didn't feel good" and at the time was noted to have fever of 100.8, chills and his left groin EVAR incision site was noted to be erythematous and so patient was brought to the [facility] ED. According Pt's health aid, he appeared a little bit more distended and was nauseous earlier today, but denies diarrhea, constipation, vomiting, chest pain, SOB, dizziness, or dysuria.    Upon arrival to ED pt /62, , Temp 100.9.  Pt received, 1L fluid bolus and responded to fluid bolus.

## 2018-04-24 NOTE — H&P ADULT - NSHPPHYSICALEXAM_GEN_ALL_CORE
General: NAD, awake, alert  Heart: RRR  Respiratory: Unlabored breathing, no respiratory distress  Abdomen: Soft, nondistended, nontender to palpation  Extremity: No LE swelling, no ulcers, left groin EVAR incision site erythema and warmth, no swelling  Vascular: Palpable DP/PT/Pop/Fem pulse b/l

## 2018-04-24 NOTE — ED ADULT NURSE NOTE - OBJECTIVE STATEMENT
pt. with PMH of aortic aneurism repaired in november presented with c/o abdominal pain for 2 weeks and fever with chills since today, pt. rates pain 4/10, pt. denies any chest pain, difficulty breathing, n/v/d, dizziness, problems with BMs or urination. CT scan and EKG done, crisis blood work sent, fluids and antibiotics in progress, cardiac monitor and O2 via nasal canula continuous, pt. is alert, awake, no acute distress, tylenol 100mg was given at home at 6pm, will monitor. pt. with PMH of aortic aneurism repaired in november presented with c/o abdominal pain for 2 weeks and fever with chills since today, pt. rates pain 4/10, pt. denies any chest pain, difficulty breathing, n/v/d, dizziness, problems with BMs or urination. CT scan and EKG done, crisis blood work sent, fluids and antibiotics in progress, cardiac monitor and O2 via nasal canula continuous, pt. is alert, awake, no acute distress, redness noted to the Lt groin the surgery site; tylenol 100mg was given at home at 6pm, will monitor.

## 2018-04-24 NOTE — H&P ADULT - ASSESSMENT
Pt is a 89 y/o M with Hx of CAD x2 s/p stents, HLD, HTN, AS, GI bleed, PE on Eliquis, AAA (s/p EVAR 11/16/17) Pt is a 89 y/o M with Hx of CAD x2 s/p stents, HLD, HTN, AS, GI bleed, PE on Eliquis, AAA (s/p EVAR 11/16/17) now with endoleak and 1.0 cm penetrating atherosclerotic ulcer and left groin cellulitis    Vancomycin/Zosyn  Home medication  NPO  F/u AM labs

## 2018-04-24 NOTE — ED ADULT TRIAGE NOTE - ARRIVAL INFO ADDITIONAL COMMENTS
Pt BIBA c/o abdominal pain and fever of 102F at home onset this afternoon. Pt O2 dependent at home on 2L NC. Pt with h/o AAA surgery in Nov 2017. Pt given Tylenol PTA.

## 2018-04-24 NOTE — H&P ADULT - PSH
Endoleak post (EVAR) endovascular aneurysm repair    H/O heart artery stent  x 2  History of back surgery  x 4  History of cholecystectomy

## 2018-04-24 NOTE — ED PROVIDER NOTE - OBJECTIVE STATEMENT
89M with concern for abd pain, fever, hx of AAA repair concern for endoleak, pt upgraded by me to me.

## 2018-04-25 LAB
ANION GAP SERPL CALC-SCNC: 8 MMOL/L — SIGNIFICANT CHANGE UP (ref 5–17)
APTT BLD: 34.7 SEC — SIGNIFICANT CHANGE UP (ref 27.5–37.4)
BUN SERPL-MCNC: 20 MG/DL — SIGNIFICANT CHANGE UP (ref 7–23)
CALCIUM SERPL-MCNC: 8 MG/DL — LOW (ref 8.4–10.5)
CHLORIDE SERPL-SCNC: 97 MMOL/L — SIGNIFICANT CHANGE UP (ref 96–108)
CO2 SERPL-SCNC: 31 MMOL/L — SIGNIFICANT CHANGE UP (ref 22–31)
CREAT SERPL-MCNC: 1.18 MG/DL — SIGNIFICANT CHANGE UP (ref 0.5–1.3)
GLUCOSE SERPL-MCNC: 119 MG/DL — HIGH (ref 70–99)
GP B STREP DNA BLD POS QL NAA+NON-PROBE: SIGNIFICANT CHANGE UP
GRAM STN FLD: SIGNIFICANT CHANGE UP
HCT VFR BLD CALC: 22.1 % — LOW (ref 39–50)
HCT VFR BLD CALC: 26.1 % — LOW (ref 39–50)
HGB BLD-MCNC: 7.1 G/DL — LOW (ref 13–17)
HGB BLD-MCNC: 8.4 G/DL — LOW (ref 13–17)
MAGNESIUM SERPL-MCNC: 1.9 MG/DL — SIGNIFICANT CHANGE UP (ref 1.6–2.6)
MCHC RBC-ENTMCNC: 32.1 G/DL — SIGNIFICANT CHANGE UP (ref 32–36)
MCHC RBC-ENTMCNC: 32.2 G/DL — SIGNIFICANT CHANGE UP (ref 32–36)
MCHC RBC-ENTMCNC: 36.5 PG — HIGH (ref 27–34)
MCHC RBC-ENTMCNC: 39 PG — HIGH (ref 27–34)
MCV RBC AUTO: 113.5 FL — HIGH (ref 80–100)
MCV RBC AUTO: 121.4 FL — HIGH (ref 80–100)
METHOD TYPE: SIGNIFICANT CHANGE UP
PLATELET # BLD AUTO: 311 K/UL — SIGNIFICANT CHANGE UP (ref 150–400)
PLATELET # BLD AUTO: 317 K/UL — SIGNIFICANT CHANGE UP (ref 150–400)
POTASSIUM SERPL-MCNC: 4.6 MMOL/L — SIGNIFICANT CHANGE UP (ref 3.5–5.3)
POTASSIUM SERPL-SCNC: 4.6 MMOL/L — SIGNIFICANT CHANGE UP (ref 3.5–5.3)
RBC # BLD: 1.82 M/UL — LOW (ref 4.2–5.8)
RBC # BLD: 2.3 M/UL — LOW (ref 4.2–5.8)
RBC # FLD: 12.1 % — SIGNIFICANT CHANGE UP (ref 10.3–16.9)
SODIUM SERPL-SCNC: 136 MMOL/L — SIGNIFICANT CHANGE UP (ref 135–145)
SPECIMEN SOURCE: SIGNIFICANT CHANGE UP
SPECIMEN SOURCE: SIGNIFICANT CHANGE UP
WBC # BLD: 4.6 K/UL — SIGNIFICANT CHANGE UP (ref 3.8–10.5)
WBC # BLD: 6.6 K/UL — SIGNIFICANT CHANGE UP (ref 3.8–10.5)
WBC # FLD AUTO: 4.6 K/UL — SIGNIFICANT CHANGE UP (ref 3.8–10.5)
WBC # FLD AUTO: 6.6 K/UL — SIGNIFICANT CHANGE UP (ref 3.8–10.5)

## 2018-04-25 PROCEDURE — 99234 HOSP IP/OBS SM DT SF/LOW 45: CPT

## 2018-04-25 RX ORDER — MAGNESIUM SULFATE 500 MG/ML
2 VIAL (ML) INJECTION ONCE
Qty: 0 | Refills: 0 | Status: COMPLETED | OUTPATIENT
Start: 2018-04-25 | End: 2018-04-25

## 2018-04-25 RX ADMIN — PANTOPRAZOLE SODIUM 40 MILLIGRAM(S): 20 TABLET, DELAYED RELEASE ORAL at 07:20

## 2018-04-25 RX ADMIN — Medication 50 GRAM(S): at 08:59

## 2018-04-25 RX ADMIN — ESCITALOPRAM OXALATE 10 MILLIGRAM(S): 10 TABLET, FILM COATED ORAL at 12:20

## 2018-04-25 RX ADMIN — Medication 81 MILLIGRAM(S): at 12:20

## 2018-04-25 RX ADMIN — Medication 100 MILLIGRAM(S): at 07:20

## 2018-04-25 RX ADMIN — PIPERACILLIN AND TAZOBACTAM 200 GRAM(S): 4; .5 INJECTION, POWDER, LYOPHILIZED, FOR SOLUTION INTRAVENOUS at 16:08

## 2018-04-25 RX ADMIN — HYDROXYUREA 500 MILLIGRAM(S): 500 CAPSULE ORAL at 08:30

## 2018-04-25 RX ADMIN — HYDROXYUREA 500 MILLIGRAM(S): 500 CAPSULE ORAL at 22:39

## 2018-04-25 RX ADMIN — Medication 250 MILLIGRAM(S): at 22:23

## 2018-04-25 RX ADMIN — PIPERACILLIN AND TAZOBACTAM 200 GRAM(S): 4; .5 INJECTION, POWDER, LYOPHILIZED, FOR SOLUTION INTRAVENOUS at 08:32

## 2018-04-25 RX ADMIN — PIPERACILLIN AND TAZOBACTAM 200 GRAM(S): 4; .5 INJECTION, POWDER, LYOPHILIZED, FOR SOLUTION INTRAVENOUS at 23:34

## 2018-04-25 RX ADMIN — Medication 100 MILLIGRAM(S): at 13:11

## 2018-04-25 RX ADMIN — PIPERACILLIN AND TAZOBACTAM 200 GRAM(S): 4; .5 INJECTION, POWDER, LYOPHILIZED, FOR SOLUTION INTRAVENOUS at 04:14

## 2018-04-25 NOTE — PROGRESS NOTE ADULT - SUBJECTIVE AND OBJECTIVE BOX
24hr events:  O/N: Admitted, NPO, VSS, started on Vanc/zosyn      Assessment/Plan;  Pt is a 89 y/o M with Hx of CAD x2 s/p stents, HLD, HTN, AS, GI bleed, PE on Eliquis, AAA (s/p EVAR 11/16/17) now with endoleak and 1.0 cm penetrating atherosclerotic ulcer and left groin cellulitis    Vancomycin/Zosyn  Home medication  NPO  F/u AM labs 24hr events:  O/N: Admitted, NPO, VSS, started on Vanc/zosyn    S. No compaints.    piperacillin/tazobactam IVPB. 2.25  vancomycin  IVPB 1000  apixaban 5  aspirin  chewable 81  piperacillin/tazobactam IVPB. 2.25  vancomycin  IVPB 1000      Allergies    No Known Allergies    Intolerances        Vital Signs Last 24 Hrs  T(C): 37.5 (2018 06:01), Max: 38.3 (2018 20:28)  T(F): 99.5 (2018 06:01), Max: 100.9 (2018 20:28)  HR: 79 (2018 06:01) (78 - 104)  BP: 135/62 (2018 06:01) (100/62 - 165/68)  BP(mean): 63 (2018 22:19) (63 - 63)  RR: 16 (2018 06:01) (16 - 20)  SpO2: 100% (2018 06:01) (97% - 100%)    Physical Exam:  General: awake, alert, NAD  Pulmonary:  Cardiovascular:  Abdominal: Soft, flat, nontender to deep palpation.  Extremities: Left groin to pelvis mild erythema. No edema. nontender. No drainage. No wounds.  Pulses:   Right:                                                                           Left:  FEM [ ]2+ [ ]1+ [ ] doppler                                            FEM [ ]2+ [ ]1+ [ ] doppler    POP [ ]2+ [ ]1+ [ ] doppler                                            POP [ ]2+ [ ]1+ [ ] doppler    DP [ ]2+ [ ]1+ [ ] doppler                                               DP [ ]2+ [ ]1+ [ ] doppler  PT[ ]2+ [ ]1+ [ ] doppler                                                 PT [ ]2+ [ ]1+ [ ] doppler      LABS:                        7.1    6.6   )-----------( 317      ( 2018 05:42 )             22.1         136  |  97  |  20  ----------------------------<  119<H>  4.6   |  31  |  1.18    Ca    8.0<L>      2018 05:42  Mg     1.9         TPro  6.8  /  Alb  3.3  /  TBili  0.3  /  DBili  x   /  AST  19  /  ALT  7<L>  /  AlkPhos  71  04-24    PT/INR - ( 2018 21:06 )   PT: 13.9 sec;   INR: 1.25          PTT - ( 2018 05:42 )  PTT:34.7 sec  Urinalysis Basic - ( 2018 22:21 )    Color: Yellow / Appearance: Clear / S.010 / pH: x  Gluc: x / Ketone: NEGATIVE  / Bili: Negative / Urobili: 0.2 E.U./dL   Blood: x / Protein: NEGATIVE mg/dL / Nitrite: NEGATIVE   Leuk Esterase: NEGATIVE / RBC: x / WBC x   Sq Epi: x / Non Sq Epi: x / Bacteria: x        RADIOLOGY & ADDITIONAL TESTS:    Assessment/Plan;  Pt is a 89 y/o M with Hx of CAD x2 s/p stents, HLD, HTN, AS, GI bleed, PE on Eliquis, AAA (s/p EVAR 17) now with endoleak and 1.0 cm penetrating atherosclerotic ulcer and left groin cellulitis    Vancomycin/Zosyn  Home medication  NPO  F/u AM labs

## 2018-04-26 ENCOUNTER — APPOINTMENT (OUTPATIENT)
Dept: PSYCHIATRY | Facility: CLINIC | Age: 83
End: 2018-04-26

## 2018-04-26 DIAGNOSIS — R78.81 BACTEREMIA: ICD-10-CM

## 2018-04-26 DIAGNOSIS — T82.330A LEAKAGE OF AORTIC (BIFURCATION) GRAFT (REPLACEMENT), INITIAL ENCOUNTER: ICD-10-CM

## 2018-04-26 LAB
ANION GAP SERPL CALC-SCNC: 9 MMOL/L — SIGNIFICANT CHANGE UP (ref 5–17)
BUN SERPL-MCNC: 20 MG/DL — SIGNIFICANT CHANGE UP (ref 7–23)
CALCIUM SERPL-MCNC: 8.2 MG/DL — LOW (ref 8.4–10.5)
CHLORIDE SERPL-SCNC: 99 MMOL/L — SIGNIFICANT CHANGE UP (ref 96–108)
CO2 SERPL-SCNC: 28 MMOL/L — SIGNIFICANT CHANGE UP (ref 22–31)
CREAT SERPL-MCNC: 1.26 MG/DL — SIGNIFICANT CHANGE UP (ref 0.5–1.3)
CULTURE RESULTS: NO GROWTH — SIGNIFICANT CHANGE UP
GLUCOSE SERPL-MCNC: 104 MG/DL — HIGH (ref 70–99)
HCT VFR BLD CALC: 27 % — LOW (ref 39–50)
HGB BLD-MCNC: 8.9 G/DL — LOW (ref 13–17)
MAGNESIUM SERPL-MCNC: 2.4 MG/DL — SIGNIFICANT CHANGE UP (ref 1.6–2.6)
MCHC RBC-ENTMCNC: 33 G/DL — SIGNIFICANT CHANGE UP (ref 32–36)
MCHC RBC-ENTMCNC: 36.9 PG — HIGH (ref 27–34)
MCV RBC AUTO: 112 FL — HIGH (ref 80–100)
OB PNL STL: NEGATIVE — SIGNIFICANT CHANGE UP
PHOSPHATE SERPL-MCNC: 3.9 MG/DL — SIGNIFICANT CHANGE UP (ref 2.5–4.5)
PLATELET # BLD AUTO: 333 K/UL — SIGNIFICANT CHANGE UP (ref 150–400)
POTASSIUM SERPL-MCNC: 4.5 MMOL/L — SIGNIFICANT CHANGE UP (ref 3.5–5.3)
POTASSIUM SERPL-SCNC: 4.5 MMOL/L — SIGNIFICANT CHANGE UP (ref 3.5–5.3)
RBC # BLD: 2.41 M/UL — LOW (ref 4.2–5.8)
RBC # FLD: 20.1 % — HIGH (ref 10.3–16.9)
SODIUM SERPL-SCNC: 136 MMOL/L — SIGNIFICANT CHANGE UP (ref 135–145)
SPECIMEN SOURCE: SIGNIFICANT CHANGE UP
VANCOMYCIN TROUGH SERPL-MCNC: 9 UG/ML — LOW (ref 10–20)
WBC # BLD: 4.2 K/UL — SIGNIFICANT CHANGE UP (ref 3.8–10.5)
WBC # FLD AUTO: 4.2 K/UL — SIGNIFICANT CHANGE UP (ref 3.8–10.5)

## 2018-04-26 PROCEDURE — 93970 EXTREMITY STUDY: CPT | Mod: 26

## 2018-04-26 PROCEDURE — 99234 HOSP IP/OBS SM DT SF/LOW 45: CPT

## 2018-04-26 PROCEDURE — 99232 SBSQ HOSP IP/OBS MODERATE 35: CPT

## 2018-04-26 PROCEDURE — 93306 TTE W/DOPPLER COMPLETE: CPT | Mod: 26

## 2018-04-26 RX ORDER — VANCOMYCIN HCL 1 G
1250 VIAL (EA) INTRAVENOUS EVERY 24 HOURS
Qty: 0 | Refills: 0 | Status: DISCONTINUED | OUTPATIENT
Start: 2018-04-26 | End: 2018-04-27

## 2018-04-26 RX ORDER — HEPARIN SODIUM 5000 [USP'U]/ML
5000 INJECTION INTRAVENOUS; SUBCUTANEOUS EVERY 12 HOURS
Qty: 0 | Refills: 0 | Status: DISCONTINUED | OUTPATIENT
Start: 2018-04-26 | End: 2018-04-27

## 2018-04-26 RX ADMIN — PANTOPRAZOLE SODIUM 40 MILLIGRAM(S): 20 TABLET, DELAYED RELEASE ORAL at 05:26

## 2018-04-26 RX ADMIN — Medication 5 MILLIGRAM(S): at 00:00

## 2018-04-26 RX ADMIN — PIPERACILLIN AND TAZOBACTAM 200 GRAM(S): 4; .5 INJECTION, POWDER, LYOPHILIZED, FOR SOLUTION INTRAVENOUS at 19:22

## 2018-04-26 RX ADMIN — ESCITALOPRAM OXALATE 10 MILLIGRAM(S): 10 TABLET, FILM COATED ORAL at 11:08

## 2018-04-26 RX ADMIN — HYDROXYUREA 500 MILLIGRAM(S): 500 CAPSULE ORAL at 21:53

## 2018-04-26 RX ADMIN — PIPERACILLIN AND TAZOBACTAM 200 GRAM(S): 4; .5 INJECTION, POWDER, LYOPHILIZED, FOR SOLUTION INTRAVENOUS at 05:27

## 2018-04-26 RX ADMIN — Medication 100 MILLIGRAM(S): at 21:53

## 2018-04-26 RX ADMIN — Medication 100 MILLIGRAM(S): at 05:27

## 2018-04-26 RX ADMIN — PIPERACILLIN AND TAZOBACTAM 200 GRAM(S): 4; .5 INJECTION, POWDER, LYOPHILIZED, FOR SOLUTION INTRAVENOUS at 11:09

## 2018-04-26 RX ADMIN — HEPARIN SODIUM 5000 UNIT(S): 5000 INJECTION INTRAVENOUS; SUBCUTANEOUS at 17:46

## 2018-04-26 RX ADMIN — HYDROXYUREA 500 MILLIGRAM(S): 500 CAPSULE ORAL at 10:26

## 2018-04-26 RX ADMIN — HEPARIN SODIUM 5000 UNIT(S): 5000 INJECTION INTRAVENOUS; SUBCUTANEOUS at 05:26

## 2018-04-26 RX ADMIN — Medication 81 MILLIGRAM(S): at 11:08

## 2018-04-26 RX ADMIN — TEMAZEPAM 30 MILLIGRAM(S): 15 CAPSULE ORAL at 00:00

## 2018-04-26 RX ADMIN — PIPERACILLIN AND TAZOBACTAM 200 GRAM(S): 4; .5 INJECTION, POWDER, LYOPHILIZED, FOR SOLUTION INTRAVENOUS at 23:48

## 2018-04-26 NOTE — CONSULT NOTE ADULT - PROBLEM SELECTOR RECOMMENDATION 2
3) Repeat Blood culture  4) TTE if possible ANA if repeated Blood culture positive  5) Pending Blood culture sensitivity continue IV Zosyn 2.25gr IV and IV Vancomycin 1gr IV q24h  6) Check Vanco trough

## 2018-04-26 NOTE — PROGRESS NOTE ADULT - SUBJECTIVE AND OBJECTIVE BOX
Pt is a 89 M Hx of CAD x2 s/p stents, HLD, HTN, AS, GI bleed, essential thrombocytosis, PE on Eliquis, AAA (s/p EVAR 11/16/17) who presented to [facility] ED with complain of fever, chills and left groin erythema x 1day and abdominal pain x2wks.  Pt has been having intermittent abdominal pain for about 2wks and today he told his home health aid that he "didn't feel good" and at the time was noted to have fever of 100.8, chills and his left groin EVAR incision site was noted to be erythematous and so patient was brought to the [facility] ED. According Pt's health aid, he appeared a little bit more distended and was nauseous earlier today, but denies diarrhea, constipation, vomiting, chest pain, SOB, dizziness, or dysuria.    Upon arrival to ED pt /62, , Temp 100.9.  Pt received, 1L fluid bolus and responded to fluid bolus.    Past Medical History:  Aneurysm    CAD (coronary artery disease)    Foot drop, left    GERD (gastroesophageal reflux disease).    Past Surgical History:  Endoleak post (EVAR) endovascular aneurysm repair    H/O heart artery stent  x 2  History of back surgery  x 4  History of cholecystectomy.      	  MEDICATIONS:    piperacillin/tazobactam IVPB. 2.25 Gram(s) IV Intermittent every 6 hours  vancomycin  IVPB 1000 milliGRAM(s) IV Intermittent every 24 hours      escitalopram 10 milliGRAM(s) Oral daily  temazepam 30 milliGRAM(s) Oral at bedtime PRN    bisacodyl 5 milliGRAM(s) Oral every 12 hours PRN  docusate sodium 100 milliGRAM(s) Oral three times a day  pantoprazole    Tablet 40 milliGRAM(s) Oral before breakfast      aspirin  chewable 81 milliGRAM(s) Oral daily  heparin  Injectable 5000 Unit(s) SubCutaneous every 12 hours  hydroxyurea 500 milliGRAM(s) Oral every 12 hours      Complaint:     Otherwise 12 point review of systems is normal.    PHYSICAL EXAM:    Constitutional: good  Eyes: PERRL, EOMI, sclera non-icteric  Neck: supple, no masses, no JVD  Respiratory: CTA b/l, good air entry b/l, no wheezing, rhonchi, rales, with normal respiratory effort and no intercostal retractions  Cardiovascular: RRR, normal S1S2, no M/R/G  Gastrointestinal: soft, NTND, no masses palpable, BS normal in all four quadrants,   Extremities:  no c/c/e  Neurological: AAOx3      ICU Vital Signs Last 24 Hrs  T(C): 36.5 (26 Apr 2018 09:13), Max: 36.6 (26 Apr 2018 05:11)  T(F): 97.7 (26 Apr 2018 09:13), Max: 97.8 (26 Apr 2018 05:11)  HR: 65 (26 Apr 2018 17:00) (55 - 85)  BP: 164/72 (26 Apr 2018 17:00) (123/58 - 164/72)  BP(mean): --  ABP: --  ABP(mean): --  RR: 16 (26 Apr 2018 17:00) (16 - 18)  SpO2: 99% (26 Apr 2018 17:00) (96% - 99%)        ECG:      CHEST X RAY    CT    MRI    MRA    CT ANGIO    CAROTID DUPLEX    DUPLEX     Echocardiogram    Catheterization:    Stress Test:     LABS:	 	  CARDIAC MARKERS:  Troponin T, Serum: 0.05 ng/mL <HH> [0.00 - 0.01] (04-24 @ 21:06)                              8.9    4.2   )-----------( 333      ( 26 Apr 2018 07:01 )             27.0     04-26    136  |  99  |  20  ----------------------------<  104<H>  4.5   |  28  |  1.26    Ca    8.2<L>      26 Apr 2018 07:01  Phos  3.9     04-26  Mg     2.4     04-26    TPro  6.8  /  Alb  3.3  /  TBili  0.3  /  DBili  x   /  AST  19  /  ALT  7<L>  /  AlkPhos  71  04-24      ASSESSMENT/PLAN: Pt is a 89 M Hx of CAD x2 s/p stents, HLD, HTN, AS, GI bleed, essential thrombocytosis, PE on Eliquis, AAA (s/p EVAR 11/16/17) who presented to [facility] ED with complain of fever, chills and left groin erythema x 1day and abdominal pain x2wks.  Pt has been having intermittent abdominal pain for about 2wks and today he told his home health aid that he "didn't feel good" and at the time was noted to have fever of 100.8, chills and his left groin EVAR incision site was noted to be erythematous and so patient was brought to the [facility] ED. According Pt's health aid, he appeared a little bit more distended and was nauseous earlier today, but denies diarrhea, constipation, vomiting, chest pain, SOB, dizziness, or dysuria.    Upon arrival to ED pt /62, , Temp 100.9.  Pt received, 1L fluid bolus and responded to fluid bolus.    Past Medical History:  Aneurysm    CAD (coronary artery disease)    Foot drop, left    GERD (gastroesophageal reflux disease).    Past Surgical History:  Endoleak post (EVAR) endovascular aneurysm repair    H/O heart artery stent  x 2  History of back surgery  x 4  History of cholecystectomy.      	  MEDICATIONS:    piperacillin/tazobactam IVPB. 2.25 Gram(s) IV Intermittent every 6 hours  vancomycin  IVPB 1000 milliGRAM(s) IV Intermittent every 24 hours      escitalopram 10 milliGRAM(s) Oral daily  temazepam 30 milliGRAM(s) Oral at bedtime PRN    bisacodyl 5 milliGRAM(s) Oral every 12 hours PRN  docusate sodium 100 milliGRAM(s) Oral three times a day  pantoprazole    Tablet 40 milliGRAM(s) Oral before breakfast      aspirin  chewable 81 milliGRAM(s) Oral daily  heparin  Injectable 5000 Unit(s) SubCutaneous every 12 hours  hydroxyurea 500 milliGRAM(s) Oral every 12 hours      Complaint:     Otherwise 12 point review of systems is normal.    PHYSICAL EXAM:    Constitutional: NAD  Eyes: PERRL, EOMI, sclera non-icteric  Neck: supple, no masses, no JVD  Respiratory: CTA b/l, good air entry b/l, no wheezing, rhonchi, rales,  Cardiovascular: RRR, normal S1S2, no M/R/G  Gastrointestinal: soft, NTND, no masses palpable, BS +  Extremity: No LE swelling, no ulcers, left groin EVAR incision site erythema and warmth, no swelling  Vascular: Palpable DP/PT/Pop/Fem pulse b/l  Neurological: AAOx3      ICU Vital Signs Last 24 Hrs  T(C): 36.5 (26 Apr 2018 09:13), Max: 36.6 (26 Apr 2018 05:11)  T(F): 97.7 (26 Apr 2018 09:13), Max: 97.8 (26 Apr 2018 05:11)  HR: 65 (26 Apr 2018 17:00) (55 - 85)  BP: 164/72 (26 Apr 2018 17:00) (123/58 - 164/72)  BP(mean): --  ABP: --  ABP(mean): --  RR: 16 (26 Apr 2018 17:00) (16 - 18)  SpO2: 99% (26 Apr 2018 17:00) (96% - 99%)    ECG:      CHEST X RAY    CT    MRI    MRA    CT ANGIO    CAROTID DUPLEX    DUPLEX     Echocardiogram    Catheterization:    Stress Test:     LABS:	 	  CARDIAC MARKERS:  Troponin T, Serum: 0.05 ng/mL <HH> [0.00 - 0.01] (04-24 @ 21:06)                              8.9    4.2   )-----------( 333      ( 26 Apr 2018 07:01 )             27.0     04-26    136  |  99  |  20  ----------------------------<  104<H>  4.5   |  28  |  1.26    Ca    8.2<L>      26 Apr 2018 07:01  Phos  3.9     04-26  Mg     2.4     04-26    TPro  6.8  /  Alb  3.3  /  TBili  0.3  /  DBili  x   /  AST  19  /  ALT  7<L>  /  AlkPhos  71  04-24    ASSESSMENT/PLAN: 	    Pt is a 89 y/o M with Hx of CAD x2 s/p stents, HLD, HTN, AS, GI bleed, PE on Eliquis, AAA (s/p EVAR 11/16/17) now with endoleak and 1.0 cm penetrating atherosclerotic ulcer and left groin cellulitis

## 2018-04-26 NOTE — PROGRESS NOTE ADULT - SUBJECTIVE AND OBJECTIVE BOX
O/N: CURTIS,VSS, Hgb 8/26, pt requested Enema, Dulcolax ordered, SubQ Hep initiated                    Assessment/Plan;    Pt is a 89 y/o M with Hx of CAD x2 s/p stents, HLD, HTN, AS, GI bleed, PE on Eliquis, AAA (s/p EVAR 11/16/17) now with endoleak and 1.0 cm penetrating atherosclerotic ulcer and left groin cellulitis    Vancomycin/Zosyn  Home medication  NPO  F/u AM labs  f/u Gallium Scan  f/u Duplex O/N: CURTIS,VSS, Hgb , pt requested Enema, Dulcolax ordered, SubQ Hep initiated      SUBJECTIVE: Patient seen and examined bedside by chief resident. Afebrile overnight.     aspirin  chewable 81 milliGRAM(s) Oral daily  heparin  Injectable 5000 Unit(s) SubCutaneous every 12 hours  piperacillin/tazobactam IVPB. 2.25 Gram(s) IV Intermittent every 6 hours  vancomycin  IVPB 1000 milliGRAM(s) IV Intermittent every 24 hours      Vital Signs Last 24 Hrs  T(C): 36.6 (2018 05:11), Max: 37.3 (2018 10:30)  T(F): 97.8 (2018 05:11), Max: 99.1 (2018 10:30)  HR: 85 (2018 05:24) (50 - 85)  BP: 151/69 (2018 05:24) (121/55 - 161/70)  BP(mean): --  RR: 16 (2018 05:24) (16 - 18)  SpO2: 96% (2018 05:24) (96% - 100%)  I&O's Detail      General: NAD, resting comfortably in bed  C/V: NSR  Pulm: Nonlabored breathing, no respiratory distress  Abd: soft, NT/ND.  Extremity: No LE swelling, no ulcers, left groin EVAR incision site erythema and warmth, no swelling  Vascular: Palpable DP/PT/Pop/Fem pulse b/l        LABS:                        8.9    4.2   )-----------( 333      ( 2018 07:01 )             27.0         136  |  99  |  20  ----------------------------<  104<H>  4.5   |  28  |  1.26    Ca    8.2<L>      2018 07:01  Phos  3.9     -  Mg     2.4         TPro  6.8  /  Alb  3.3  /  TBili  0.3  /  DBili  x   /  AST  19  /  ALT  7<L>  /  AlkPhos  71  -24    PT/INR - ( 2018 21:06 )   PT: 13.9 sec;   INR: 1.25          PTT - ( 2018 05:42 )  PTT:34.7 sec  Urinalysis Basic - ( 2018 22:21 )    Color: Yellow / Appearance: Clear / S.010 / pH: x  Gluc: x / Ketone: NEGATIVE  / Bili: Negative / Urobili: 0.2 E.U./dL   Blood: x / Protein: NEGATIVE mg/dL / Nitrite: NEGATIVE   Leuk Esterase: NEGATIVE / RBC: x / WBC x   Sq Epi: x / Non Sq Epi: x / Bacteria: x        RADIOLOGY & ADDITIONAL STUDIES:              Assessment/Plan;    Pt is a 89 y/o M with Hx of CAD x2 s/p stents, HLD, HTN, AS, GI bleed, PE on Eliquis, AAA (s/p EVAR 17) now with endoleak and 1.0 cm penetrating atherosclerotic ulcer and left groin cellulitis    Vancomycin/Zosyn  Home medication  NPO  F/u AM labs  f/u Gallium Scan  f/u Duplex  Repeat blood cultures

## 2018-04-26 NOTE — CONSULT NOTE ADULT - ASSESSMENT
Pt is a 89 y/o M with Hx of CAD x2 s/p stents, HLD, HTN, AS, GI bleed, PE on Eliquis, AAA (s/p EVAR 11/16/17) now with endoleak and 1.0 cm penetrating atherosclerotic ulcer and left groin cellulitis with Streptococcus Group B bacteremia, r/o EVAR infection

## 2018-04-26 NOTE — CONSULT NOTE ADULT - SUBJECTIVE AND OBJECTIVE BOX
HPI:  Pt is a 89 M Hx of CAD x2 s/p stents, HLD, HTN, AS, GI bleed, essential thrombocytosis, PE on Eliquis, AAA (s/p EVAR 17) who presented to [facility] ED with complain of fever, chills and left groin erythema x 1day and abdominal pain x2wks.  Pt has been having intermittent abdominal pain for about 2wks and today he told his home health aid that he "didn't feel good" and at the time was noted to have fever of 100.8, chills and his left groin EVAR incision site was noted to be erythematous and so patient was brought to the [facility] ED. According Pt's health aid, he appeared a little bit more distended and was nauseous earlier today, but denies diarrhea, constipation, vomiting, chest pain, SOB, dizziness, or dysuria.    Upon arrival to ED pt /62, , Temp 100.9.  Pt received, 1L fluid bolus and responded to fluid bolus. Now blood culture (+) Streptococcus      PAST MEDICAL & SURGICAL HISTORY:  Foot drop, left  CAD (coronary artery disease)  GERD (gastroesophageal reflux disease)  Aneurysm  Endoleak post (EVAR) endovascular aneurysm repair  History of cholecystectomy  H/O heart artery stent: x 2  History of back surgery: x 4        REVIEW OF SYSTEMS:    General:(+) weakness; (+) fevers, no chills  Skin/Breast: no rash  Respiratory and Thorax: no SOB, no cough  Cardiovascular:	No chest pain  Gastrointestinal:	 no nausea, vomiting , diarrhea  Genitourinary:	no dysuria, no difficulty urinating, no hematuria  Musculoskeletal:	L groin swelling/pain  Neurological:	no focal weakness/numbness  Endocrine: no polyuria, no polydipsia      ANTIBIOTICS:  MEDICATIONS  (STANDING):  aspirin  chewable 81 milliGRAM(s) Oral daily  docusate sodium 100 milliGRAM(s) Oral three times a day  escitalopram 10 milliGRAM(s) Oral daily  heparin  Injectable 5000 Unit(s) SubCutaneous every 12 hours  hydroxyurea 500 milliGRAM(s) Oral every 12 hours  pantoprazole    Tablet 40 milliGRAM(s) Oral before breakfast  piperacillin/tazobactam IVPB. 2.25 Gram(s) IV Intermittent every 6 hours  vancomycin  IVPB 1000 milliGRAM(s) IV Intermittent every 24 hours    MEDICATIONS  (PRN):  bisacodyl 5 milliGRAM(s) Oral every 12 hours PRN Constipation  temazepam 30 milliGRAM(s) Oral at bedtime PRN Insomnia      Allergies: No Known Allergies      SOCIAL HISTORY: no ETOH, no smoking    FAMILY HISTORY:  No pertinent family history      Vital Signs Last 24 Hrs  T(C): 36.5 (2018 09:13), Max: 37.3 (2018 13:05)  T(F): 97.7 (2018 09:13), Max: 99.1 (2018 13:05)  HR: 65 (2018 12:27) (50 - 85)  BP: 123/58 (2018 12:27) (123/58 - 161/70)  BP(mean): --  RR: 18 (2018 12:27) (16 - 18)  SpO2: 99% (2018 12:27) (96% - 100%)    18 @ 07:01  -  18 @ 12:44  --------------------------------------------------------  IN: 340 mL / OUT: 100 mL / NET: 240 mL        PHYSICAL EXAM:  Constitutional:Well-developed, well nourished  Eyes:CORNELIUS, EOMI  Ear/Nose/Throat: no oral lesion, no sinus tenderness on percussion	  Neck:no JVD, no lymphadenopathy, supple  Respiratory: CTA antonieta  Cardiovascular: S1S2 RRR, no murmurs  Gastrointestinal: soft, (+) BS, no HSM  Extremities:no e/e/c,  left groin EVAR incision site erythema and warmth, no swelling  Vascular: DP Pulse: right normal; left normal            LABS:                        8.9    4.2   )-----------( 333      ( 2018 07:01 )             27.0         136  |  99  |  20  ----------------------------<  104<H>  4.5   |  28  |  1.26    Ca    8.2<L>      2018 07:01  Phos  3.9       Mg     2.4         TPro  6.8  /  Alb  3.3  /  TBili  0.3  /  DBili  x   /  AST  19  /  ALT  7<L>  /  AlkPhos  71      PT/INR - ( 2018 21:06 )   PT: 13.9 sec;   INR: 1.25          PTT - ( 2018 05:42 )  PTT:34.7 sec  Urinalysis Basic - ( 2018 22:21 )    Color: Yellow / Appearance: Clear / S.010 / pH: x  Gluc: x / Ketone: NEGATIVE  / Bili: Negative / Urobili: 0.2 E.U./dL   Blood: x / Protein: NEGATIVE mg/dL / Nitrite: NEGATIVE   Leuk Esterase: NEGATIVE / RBC: x / WBC x   Sq Epi: x / Non Sq Epi: x / Bacteria: x        MICROBIOLOGY:  Culture - Blood (18 @ 09:26)    Gram Stain:   Anaerobic Bottle: Gram Positive Cocci in Pairs and Chains  Result called to and read back by_  Lilliam You RN  2018 13:18:51  Aerobic Bottle: Gram Positive Cocci in Pairs and Chains  Result called to and read back by_ REYES Sheth RN  2018 15:19:26    Specimen Source: .Blood Blood-Peripheral    Culture Results:   Growth in aerobic and anaerobic bottles: Streptococcus agalactiae (Group  B)  Susceptibility to follow.    Culture - Blood (18 @ 09:26)    -  Streptococcus agalactiae (Group B): Detec    Gram Stain:   Anaerobic Bottle: Gram Positive Cocci in Pairs and Chains  Result called to and read back by_ Lilliam You RN  2018 13:18:51  ***Blood Panel PCR results on this specimen are available  approximately 3 hours after the Gram stain result.***  Gram stain, PCR, and/or culture results may not always  correspond due to difference in methodologies.  ************************************************************  This PCR assay was performed using [employer].  The following targets are tested for: Enterococcus,  vancomycin resistant enterococci, Listeria monocytogenes,  coagulase negative staphylococci, S. aureus,  methicillin resistant S. aureus, Streptococcus agalactiae  (Group B), S. pneumoniae, S. pyogenes (Group A),  Acinetobacter baumannii, Enterobacter cloacae, E. coli,  Klebsiella oxytoca, K. pneumoniae, Proteus sp.,  Serratia marcescens, Haemophilus influenzae,  Neisseria meningitidis, Pseudomonas aeruginosa, Candida  albicans, C. glabrata, C krusei, C parapsilosis,  C. tropicalis and the KPC resistance gene.  Aerobic Bottle: Gram Positive Cocci in Pairs and Chains  Result called to and read back by_  REYES Sheth RN  2018 15:19:26    Specimen Source: .Blood Blood-Peripheral    Organism: Blood Culture PCR    Culture Results:   Growth in aerobic and anaerobic bottles: Streptococcus agalactiae (Group  B)  Susceptibility to follow.    Organism Identification: Blood Culture PCR    Method Type: PCR      RADIOLOGY & ADDITIONAL STUDIES: HPI:  Pt is a 89 M Hx of CAD x2 s/p stents, HLD, HTN, AS, GI bleed, essential thrombocytosis, PE on Eliquis, AAA (s/p EVAR 17) who presented to [facility] ED with complain of fever, chills and left groin erythema x 1day and abdominal pain x2wks.  Pt has been having intermittent abdominal pain for about 2wks and today he told his home health aid that he "didn't feel good" and at the time was noted to have fever of 100.8, chills and his left groin EVAR incision site was noted to be erythematous and so patient was brought to the [facility] ED. According Pt's health aid, he appeared a little bit more distended and was nauseous earlier today, but denies diarrhea, constipation, vomiting, chest pain, SOB, dizziness, or dysuria.    Upon arrival to ED pt /62, , Temp 100.9.  Pt received, 1L fluid bolus and responded to fluid bolus. Now blood culture (+) Streptococcus      PAST MEDICAL & SURGICAL HISTORY:  Foot drop, left  CAD (coronary artery disease)  GERD (gastroesophageal reflux disease)  Aneurysm  Endoleak post (EVAR) endovascular aneurysm repair  History of cholecystectomy  H/O heart artery stent: x 2  History of back surgery: x 4        REVIEW OF SYSTEMS:    General:(+) weakness; (+) fevers, no chills  Skin/Breast: no rash  Respiratory and Thorax: no SOB, no cough  Cardiovascular:	No chest pain  Gastrointestinal:	 no nausea, vomiting , diarrhea  Genitourinary:	no dysuria, no difficulty urinating, no hematuria  Musculoskeletal:	L groin swelling/pain  Neurological:	no focal weakness/numbness  Endocrine: no polyuria, no polydipsia      ANTIBIOTICS:  MEDICATIONS  (STANDING):  aspirin  chewable 81 milliGRAM(s) Oral daily  docusate sodium 100 milliGRAM(s) Oral three times a day  escitalopram 10 milliGRAM(s) Oral daily  heparin  Injectable 5000 Unit(s) SubCutaneous every 12 hours  hydroxyurea 500 milliGRAM(s) Oral every 12 hours  pantoprazole    Tablet 40 milliGRAM(s) Oral before breakfast  piperacillin/tazobactam IVPB. 2.25 Gram(s) IV Intermittent every 6 hours  vancomycin  IVPB 1000 milliGRAM(s) IV Intermittent every 24 hours    MEDICATIONS  (PRN):  bisacodyl 5 milliGRAM(s) Oral every 12 hours PRN Constipation  temazepam 30 milliGRAM(s) Oral at bedtime PRN Insomnia      Allergies: No Known Allergies      SOCIAL HISTORY: no ETOH, no smoking    FAMILY HISTORY:  No pertinent family history      Vital Signs Last 24 Hrs  T(C): 36.5 (2018 09:13), Max: 37.3 (2018 13:05)  T(F): 97.7 (2018 09:13), Max: 99.1 (2018 13:05)  HR: 65 (2018 12:27) (50 - 85)  BP: 123/58 (2018 12:27) (123/58 - 161/70)  BP(mean): --  RR: 18 (2018 12:27) (16 - 18)  SpO2: 99% (2018 12:27) (96% - 100%)    18 @ 07:01  -  18 @ 12:44  --------------------------------------------------------  IN: 340 mL / OUT: 100 mL / NET: 240 mL        PHYSICAL EXAM:  Constitutional:Well-developed, well nourished  Eyes:CORNELIUS, EOMI  Ear/Nose/Throat: no oral lesion, no sinus tenderness on percussion	  Neck:no JVD, no lymphadenopathy, supple  Respiratory: CTA antonieta  Cardiovascular: S1S2 RRR, no murmurs  Gastrointestinal: soft, (+) BS, no HSM  Extremities:no e/e/c, mild R groin erythema,  left groin EVAR incision site erythema and warmth, no swelling  Vascular: DP Pulse: right normal; left normal            LABS:                        8.9    4.2   )-----------( 333      ( 2018 07:01 )             27.0         136  |  99  |  20  ----------------------------<  104<H>  4.5   |  28  |  1.26    Ca    8.2<L>      2018 07:01  Phos  3.9       Mg     2.4         TPro  6.8  /  Alb  3.3  /  TBili  0.3  /  DBili  x   /  AST  19  /  ALT  7<L>  /  AlkPhos  71      PT/INR - ( 2018 21:06 )   PT: 13.9 sec;   INR: 1.25          PTT - ( 2018 05:42 )  PTT:34.7 sec  Urinalysis Basic - ( 2018 22:21 )    Color: Yellow / Appearance: Clear / S.010 / pH: x  Gluc: x / Ketone: NEGATIVE  / Bili: Negative / Urobili: 0.2 E.U./dL   Blood: x / Protein: NEGATIVE mg/dL / Nitrite: NEGATIVE   Leuk Esterase: NEGATIVE / RBC: x / WBC x   Sq Epi: x / Non Sq Epi: x / Bacteria: x        MICROBIOLOGY:  Culture - Blood (18 @ 09:26)    Gram Stain:   Anaerobic Bottle: Gram Positive Cocci in Pairs and Chains  Result called to and read back by_  Lilliam You RN  2018 13:18:51  Aerobic Bottle: Gram Positive Cocci in Pairs and Chains  Result called to and read back by_ REYES Sheth RN  2018 15:19:26    Specimen Source: .Blood Blood-Peripheral    Culture Results:   Growth in aerobic and anaerobic bottles: Streptococcus agalactiae (Group  B)  Susceptibility to follow.    Culture - Blood (18 @ 09:26)    -  Streptococcus agalactiae (Group B): Detec    Gram Stain:   Anaerobic Bottle: Gram Positive Cocci in Pairs and Chains  Result called to and read back by_ Lilliam You RN  2018 13:18:51  ***Blood Panel PCR results on this specimen are available  approximately 3 hours after the Gram stain result.***  Gram stain, PCR, and/or culture results may not always  correspond due to difference in methodologies.  ************************************************************  This PCR assay was performed using [employer].  The following targets are tested for: Enterococcus,  vancomycin resistant enterococci, Listeria monocytogenes,  coagulase negative staphylococci, S. aureus,  methicillin resistant S. aureus, Streptococcus agalactiae  (Group B), S. pneumoniae, S. pyogenes (Group A),  Acinetobacter baumannii, Enterobacter cloacae, E. coli,  Klebsiella oxytoca, K. pneumoniae, Proteus sp.,  Serratia marcescens, Haemophilus influenzae,  Neisseria meningitidis, Pseudomonas aeruginosa, Candida  albicans, C. glabrata, C krusei, C parapsilosis,  C. tropicalis and the KPC resistance gene.  Aerobic Bottle: Gram Positive Cocci in Pairs and Chains  Result called to and read back by_  REYES Sheth RN  2018 15:19:26    Specimen Source: .Blood Blood-Peripheral    Organism: Blood Culture PCR    Culture Results:   Growth in aerobic and anaerobic bottles: Streptococcus agalactiae (Group  B)  Susceptibility to follow.    Organism Identification: Blood Culture PCR    Method Type: PCR      RADIOLOGY & ADDITIONAL STUDIES:

## 2018-04-27 LAB
-  AMPICILLIN: SIGNIFICANT CHANGE UP
-  CLINDAMYCIN: SIGNIFICANT CHANGE UP
-  ERYTHROMYCIN: SIGNIFICANT CHANGE UP
-  VANCOMYCIN: SIGNIFICANT CHANGE UP
ANION GAP SERPL CALC-SCNC: 10 MMOL/L — SIGNIFICANT CHANGE UP (ref 5–17)
APTT BLD: 28.3 SEC — SIGNIFICANT CHANGE UP (ref 27.5–37.4)
BLD GP AB SCN SERPL QL: NEGATIVE — SIGNIFICANT CHANGE UP
BUN SERPL-MCNC: 23 MG/DL — SIGNIFICANT CHANGE UP (ref 7–23)
CALCIUM SERPL-MCNC: 8.2 MG/DL — LOW (ref 8.4–10.5)
CHLORIDE SERPL-SCNC: 100 MMOL/L — SIGNIFICANT CHANGE UP (ref 96–108)
CO2 SERPL-SCNC: 28 MMOL/L — SIGNIFICANT CHANGE UP (ref 22–31)
CREAT SERPL-MCNC: 1.14 MG/DL — SIGNIFICANT CHANGE UP (ref 0.5–1.3)
GLUCOSE SERPL-MCNC: 85 MG/DL — SIGNIFICANT CHANGE UP (ref 70–99)
HCT VFR BLD CALC: 24.1 % — LOW (ref 39–50)
HCT VFR BLD CALC: 24.2 % — LOW (ref 39–50)
HGB BLD-MCNC: 7.8 G/DL — LOW (ref 13–17)
HGB BLD-MCNC: 7.9 G/DL — LOW (ref 13–17)
MAGNESIUM SERPL-MCNC: 2.2 MG/DL — SIGNIFICANT CHANGE UP (ref 1.6–2.6)
MCHC RBC-ENTMCNC: 32.4 G/DL — SIGNIFICANT CHANGE UP (ref 32–36)
MCHC RBC-ENTMCNC: 32.6 G/DL — SIGNIFICANT CHANGE UP (ref 32–36)
MCHC RBC-ENTMCNC: 36.4 PG — HIGH (ref 27–34)
MCHC RBC-ENTMCNC: 36.6 PG — HIGH (ref 27–34)
MCV RBC AUTO: 111.5 FL — HIGH (ref 80–100)
MCV RBC AUTO: 113.1 FL — HIGH (ref 80–100)
METHOD TYPE: SIGNIFICANT CHANGE UP
PHOSPHATE SERPL-MCNC: 3.3 MG/DL — SIGNIFICANT CHANGE UP (ref 2.5–4.5)
PLATELET # BLD AUTO: 284 K/UL — SIGNIFICANT CHANGE UP (ref 150–400)
PLATELET # BLD AUTO: 294 K/UL — SIGNIFICANT CHANGE UP (ref 150–400)
POTASSIUM SERPL-MCNC: 4.8 MMOL/L — SIGNIFICANT CHANGE UP (ref 3.5–5.3)
POTASSIUM SERPL-SCNC: 4.8 MMOL/L — SIGNIFICANT CHANGE UP (ref 3.5–5.3)
RBC # BLD: 2.13 M/UL — LOW (ref 4.2–5.8)
RBC # BLD: 2.17 M/UL — LOW (ref 4.2–5.8)
RBC # FLD: 18.2 % — HIGH (ref 10.3–16.9)
RBC # FLD: 19.1 % — HIGH (ref 10.3–16.9)
RH IG SCN BLD-IMP: POSITIVE — SIGNIFICANT CHANGE UP
SODIUM SERPL-SCNC: 138 MMOL/L — SIGNIFICANT CHANGE UP (ref 135–145)
WBC # BLD: 3.1 K/UL — LOW (ref 3.8–10.5)
WBC # BLD: 3.3 K/UL — LOW (ref 3.8–10.5)
WBC # FLD AUTO: 3.1 K/UL — LOW (ref 3.8–10.5)
WBC # FLD AUTO: 3.3 K/UL — LOW (ref 3.8–10.5)

## 2018-04-27 PROCEDURE — 99232 SBSQ HOSP IP/OBS MODERATE 35: CPT

## 2018-04-27 PROCEDURE — 99234 HOSP IP/OBS SM DT SF/LOW 45: CPT

## 2018-04-27 RX ORDER — VANCOMYCIN HCL 1 G
1000 VIAL (EA) INTRAVENOUS DAILY
Qty: 0 | Refills: 0 | Status: DISCONTINUED | OUTPATIENT
Start: 2018-04-27 | End: 2018-04-27

## 2018-04-27 RX ORDER — APIXABAN 2.5 MG/1
5 TABLET, FILM COATED ORAL EVERY 12 HOURS
Qty: 0 | Refills: 0 | Status: DISCONTINUED | OUTPATIENT
Start: 2018-04-27 | End: 2018-04-27

## 2018-04-27 RX ADMIN — Medication 81 MILLIGRAM(S): at 11:02

## 2018-04-27 RX ADMIN — PANTOPRAZOLE SODIUM 40 MILLIGRAM(S): 20 TABLET, DELAYED RELEASE ORAL at 04:43

## 2018-04-27 RX ADMIN — HEPARIN SODIUM 5000 UNIT(S): 5000 INJECTION INTRAVENOUS; SUBCUTANEOUS at 06:05

## 2018-04-27 RX ADMIN — Medication 100 MILLIGRAM(S): at 21:10

## 2018-04-27 RX ADMIN — HYDROXYUREA 500 MILLIGRAM(S): 500 CAPSULE ORAL at 21:10

## 2018-04-27 RX ADMIN — Medication 100 MILLIGRAM(S): at 06:05

## 2018-04-27 RX ADMIN — Medication 100 MILLIGRAM(S): at 14:14

## 2018-04-27 RX ADMIN — PIPERACILLIN AND TAZOBACTAM 200 GRAM(S): 4; .5 INJECTION, POWDER, LYOPHILIZED, FOR SOLUTION INTRAVENOUS at 14:04

## 2018-04-27 RX ADMIN — Medication 30 MILLILITER(S): at 20:31

## 2018-04-27 RX ADMIN — HYDROXYUREA 500 MILLIGRAM(S): 500 CAPSULE ORAL at 11:02

## 2018-04-27 RX ADMIN — ESCITALOPRAM OXALATE 10 MILLIGRAM(S): 10 TABLET, FILM COATED ORAL at 11:01

## 2018-04-27 RX ADMIN — PIPERACILLIN AND TAZOBACTAM 200 GRAM(S): 4; .5 INJECTION, POWDER, LYOPHILIZED, FOR SOLUTION INTRAVENOUS at 18:50

## 2018-04-27 RX ADMIN — Medication 166.67 MILLIGRAM(S): at 00:29

## 2018-04-27 RX ADMIN — PIPERACILLIN AND TAZOBACTAM 200 GRAM(S): 4; .5 INJECTION, POWDER, LYOPHILIZED, FOR SOLUTION INTRAVENOUS at 06:05

## 2018-04-27 NOTE — PROGRESS NOTE ADULT - ASSESSMENT
Pt is a 89 y/o M with Hx of CAD x2 s/p stents, HLD, HTN, AS, GI bleed, PE on Eliquis, AAA (s/p EVAR 11/16/17) now with endoleak and 1.0 cm penetrating atherosclerotic ulcer and left groin cellulitis with Streptococcus Group B bacteremia,   r/o EVAR infection

## 2018-04-27 NOTE — DIETITIAN INITIAL EVALUATION ADULT. - OTHER INFO
89 y/o M, now with endoleak and 1.0 cm penetrating atherosclerotic ulcer and left groin cellulitis. pt on regular diet with good appetite, consumes >75% of meals, denies N/V/D/C; last BM was yesterday; denies pain, w/NKFA. Skin intact, w/ edema.

## 2018-04-27 NOTE — PROVIDER CONTACT NOTE (OTHER) - RECOMMENDATIONS
VICENTE Robertson to come and assess at bedside; tylenol for pain? simethicone for possible gas? VICENTE Robertson to come and assess at bedside; tylenol for pain? simethicone/protonix for possible gas?

## 2018-04-27 NOTE — PROVIDER CONTACT NOTE (OTHER) - SITUATION
Pt c/o abdominal pain; VSS; afebrile. States he is not feeling nauseous or vomiting; no chills or fever; abdomen soft to touch

## 2018-04-27 NOTE — DIETITIAN INITIAL EVALUATION ADULT. - ENERGY NEEDS
Ht: 175.26cm, wt (4/24) 79.4kg, IBW: 72.7kg  %IBW:  109% BMI:  25.9; ABW utilized for nutritional needs as pt within % of IBW; needs adjusted per age and current status

## 2018-04-27 NOTE — PROGRESS NOTE ADULT - SUBJECTIVE AND OBJECTIVE BOX
INTERVAL HPI/OVERNIGHT EVENTS: Afebrile, L groin improved erythema    CONSTITUTIONAL:  Negative fever or chills, good appetite  EYES:  Negative  blurry vision or double vision  CARDIOVASCULAR:  Negative for chest pain or palpitations  RESPIRATORY:  Negative for cough, wheezing, or SOB   GASTROINTESTINAL:  Negative for nausea, vomiting, diarrhea, constipation, or abdominal pain  GENITOURINARY:  Negative frequency, urgency or dysuria  NEUROLOGIC:  No headache, confusion, dizziness, lightheadedness      ANTIBIOTICS/RELEVANT:    MEDICATIONS  (STANDING):  aspirin  chewable 81 milliGRAM(s) Oral daily  docusate sodium 100 milliGRAM(s) Oral three times a day  escitalopram 10 milliGRAM(s) Oral daily  hydroxyurea 500 milliGRAM(s) Oral every 12 hours  pantoprazole    Tablet 40 milliGRAM(s) Oral before breakfast  piperacillin/tazobactam IVPB. 2.25 Gram(s) IV Intermittent every 6 hours  vancomycin  IVPB 1000 milliGRAM(s) IV Intermittent daily    MEDICATIONS  (PRN):  bisacodyl 5 milliGRAM(s) Oral every 12 hours PRN Constipation  temazepam 30 milliGRAM(s) Oral at bedtime PRN Insomnia        Vital Signs Last 24 Hrs  T(C): 36.4 (27 Apr 2018 13:25), Max: 37.1 (27 Apr 2018 05:30)  T(F): 97.6 (27 Apr 2018 13:25), Max: 98.8 (27 Apr 2018 05:30)  HR: 70 (27 Apr 2018 14:02) (64 - 83)  BP: 145/63 (27 Apr 2018 14:02) (135/66 - 164/72)  BP(mean): --  RR: 16 (27 Apr 2018 14:02) (16 - 16)  SpO2: 98% (27 Apr 2018 14:02) (95% - 99%)    04-26-18 @ 07:01  -  04-27-18 @ 07:00  --------------------------------------------------------  IN: 460 mL / OUT: 200 mL / NET: 260 mL    04-27-18 @ 07:01  -  04-27-18 @ 15:32  --------------------------------------------------------  IN: 180 mL / OUT: 0 mL / NET: 180 mL      PHYSICAL EXAM:   Constitutional:Well-developed, well nourished, elderly male, no distress  Eyes: CORNELIUS, EOMI  Ear/Nose/Throat: no oral lesion, no sinus tenderness on percussion	  Neck:no JVD, no lymphadenopathy, supple  Respiratory: CTA [place]  Cardiovascular: S1S2 RRR, no murmurs  Gastrointestinal:soft, (+) BS, no HSM  Extremities:L groin erythema improved  Vascular: DP Pulse: right normal; left normal      LABS:                        7.8    3.3   )-----------( 284      ( 27 Apr 2018 06:41 )             24.1     04-27    138  |  100  |  23  ----------------------------<  85  4.8   |  28  |  1.14    Ca    8.2<L>      27 Apr 2018 06:41  Phos  3.3     04-27  Mg     2.2     04-27    MICROBIOLOGY:  Culture - Blood (04.26.18 @ 11:39)    Specimen Source: .Blood Blood-Venous    Culture Results:   No growth at 1 day.    Culture - Blood (04.25.18 @ 09:26)    Gram Stain:   Anaerobic Bottle: Gram Positive Cocci in Pairs and Chains  Result called to and read back by_  Lilliam You RN  04/25/2018 13:18:51  Aerobic Bottle: Gram Positive Cocci in Pairs and Chains  Result called to and read back by_ REYES Sheth RN  04/25/2018 15:19:26    Specimen Source: .Blood Blood-Peripheral    Culture Results:   Growth in aerobic and anaerobic bottles: Streptococcus agalactiae (Group  B)  Susceptibility to follow.        RADIOLOGY & ADDITIONAL STUDIES:

## 2018-04-27 NOTE — PROGRESS NOTE ADULT - SUBJECTIVE AND OBJECTIVE BOX
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 Afebrile, groin erythema improved.     apixaban 5 milliGRAM(s) Oral every 12 hours  aspirin  chewable 81 milliGRAM(s) Oral daily  piperacillin/tazobactam IVPB. 2.25 Gram(s) IV Intermittent every 6 hours  vancomycin  IVPB 1000 milliGRAM(s) IV Intermittent daily      Vital Signs Last 24 Hrs  T(C): 36.6 (27 Apr 2018 09:39), Max: 37.1 (27 Apr 2018 05:30)  T(F): 97.9 (27 Apr 2018 09:39), Max: 98.8 (27 Apr 2018 05:30)  HR: 64 (27 Apr 2018 06:09) (64 - 66)  BP: 163/82 (27 Apr 2018 06:09) (123/58 - 164/72)  BP(mean): --  RR: 16 (27 Apr 2018 06:09) (16 - 18)  SpO2: 95% (27 Apr 2018 06:09) (95% - 99%)  I&O's Detail    26 Apr 2018 07:01  -  27 Apr 2018 07:00  --------------------------------------------------------  IN:    IV PiggyBack: 100 mL    Oral Fluid: 360 mL  Total IN: 460 mL    OUT:    Voided: 200 mL  Total OUT: 200 mL    Total NET: 260 mL      27 Apr 2018 07:01  -  27 Apr 2018 10:17  --------------------------------------------------------  IN:    Oral Fluid: 180 mL  Total IN: 180 mL    OUT:  Total OUT: 0 mL    Total NET: 180 mL          General: NAD, resting comfortably in bed  C/V: NSR  Pulm: Nonlabored breathing, no respiratory distress  Abd: soft, NT/ND.  Extrem: WWP, no edema, SCDs in place        LABS:                        7.8    3.3   )-----------( 284      ( 27 Apr 2018 06:41 )             24.1     04-27    138  |  100  |  23  ----------------------------<  85  4.8   |  28  |  1.14    Ca    8.2<L>      27 Apr 2018 06:41  Phos  3.3     04-27  Mg     2.2     04-27            RADIOLOGY & ADDITIONAL STUDIES:              Assessment/Plan;    Pt is a 89 y/o M with Hx of CAD x2 s/p stents, HLD, HTN, AS, GI bleed, PE on Eliquis, AAA (s/p EVAR 11/16/17) now with endoleak and 1.0 cm penetrating atherosclerotic ulcer and left groin cellulitis    Vancomycin/Zosyn. Continue vanc dose of 1000 mg  Home medication  NPO  F/u AM labs  f/u Gallium Scan  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

## 2018-04-27 NOTE — PROVIDER CONTACT NOTE (OTHER) - ACTION/TREATMENT ORDERED:
PA Robertson at bedside to assess. As per PA, pt abdomen soft with slight discomfort. As per PA, continue to monitor pt. PA Robertson at bedside to assess. As per PA, pt abdomen soft with slight discomfort. As per PA, give AM protonix early and continue to monitor pt.

## 2018-04-27 NOTE — CONSULT NOTE ADULT - SUBJECTIVE AND OBJECTIVE BOX
WELLINGTON GARCIA    [id number]    Patient is a 89y old  Male who presents with a chief complaint of Endoleak/Left groin cellulitis (24 Apr 2018 22:47)      HPI:  Pt is a 89 M Hx of CAD x2 s/p stents, HLD, HTN, AS, GI bleed, essential thrombocytosis, PE on Eliquis, AAA (s/p EVAR 11/16/17) who presented to [facility] ED with complain of fever, chills and left groin erythema x 1day and abdominal pain x2wks.  Pt has been having intermittent abdominal pain for about 2wks and today he told his home health aid that he "didn't feel good" and at the time was noted to have fever of 100.8, chills and his left groin EVAR incision site was noted to be erythematous and so patient was brought to the [facility] ED. According Pt's health aid, he appeared a little bit more distended and was nauseous earlier today, but denies diarrhea, constipation, vomiting, chest pain, SOB, dizziness, or dysuria.    Upon arrival to ED pt /62, , Temp 100.9.  Pt received, 1L fluid bolus and responded to fluid bolus. (24 Apr 2018 22:47)      PAST MEDICAL & SURGICAL HISTORY:  Foot drop, left  CAD (coronary artery disease)  GERD (gastroesophageal reflux disease)  Aneurysm  Endoleak post (EVAR) endovascular aneurysm repair  History of cholecystectomy  H/O heart artery stent: x 2  History of back surgery: x 4        Social History:    FAMILY HISTORY:  No pertinent family history      Functional Level Prior to Admission:                          7.8    3.3   )-----------( 284      ( 27 Apr 2018 06:41 )             24.1       04-27    138  |  100  |  23  ----------------------------<  85  4.8   |  28  |  1.14    Ca    8.2<L>      27 Apr 2018 06:41  Phos  3.3     04-27  Mg     2.2     04-27        Vital Signs Last 24 Hrs  T(C): 36.6 (27 Apr 2018 09:39), Max: 37.1 (27 Apr 2018 05:30)  T(F): 97.9 (27 Apr 2018 09:39), Max: 98.8 (27 Apr 2018 05:30)  HR: 83 (27 Apr 2018 08:58) (64 - 83)  BP: 160/70 (27 Apr 2018 08:58) (123/58 - 164/72)  BP(mean): --  RR: 16 (27 Apr 2018 08:58) (16 - 18)  SpO2: 95% (27 Apr 2018 08:58) (95% - 99%)      PHYSICAL EXAM:  This 89 y-o male was admitted on 04/24/18 with fever, chills, Lt. groin erytherma and ENDOleak.  h/o CAD stents, PE, AAA, s/p EVAR.  Lives alone, has 24 hrsHHA, no ambulatory, needs assistance for  transfer  with rolling walker.  /p multiple back surgery with Lt. foot drop.      Constitutional:  lying in bed comfortably    Eyes: neg.    ENMT: neg.    Neck: supple, no neck pain    Breasts:    Back: denies back pain    Respiratory: no SOB    Cardiovascular: no chest pain    Gastrointestinal:  no abdominal pain    Genitourinary:    Rectal:    Extremities:  full ROM 4 ext., no joint pain, no edema, Lt .groin no swelling, no pain, Lt.  foot  drop, old.    Vascular:    Neurological: No cranial N. deficits.  Deconditioned 3+-4/5, Lt.  foot  drop    Skin:    Lymph Nodes:    Musculoskeletal:    Psychiatric:            Impression:  1.  Endo leak  2. s/p Lt. groin cellulitis,   3. Lt. foot drop, multiple back surgery    Recommendations:  1. PT for bed mobility and transfer activity  2. Home PT / HHA

## 2018-04-28 LAB
-  AMPICILLIN: SIGNIFICANT CHANGE UP
-  AMPICILLIN: SIGNIFICANT CHANGE UP
-  CLINDAMYCIN: SIGNIFICANT CHANGE UP
-  ERYTHROMYCIN: SIGNIFICANT CHANGE UP
-  PENICILLIN: SIGNIFICANT CHANGE UP
-  PENICILLIN: SIGNIFICANT CHANGE UP
-  VANCOMYCIN: SIGNIFICANT CHANGE UP
ANION GAP SERPL CALC-SCNC: 7 MMOL/L — SIGNIFICANT CHANGE UP (ref 5–17)
APTT BLD: 35.5 SEC — SIGNIFICANT CHANGE UP (ref 27.5–37.4)
BUN SERPL-MCNC: 20 MG/DL — SIGNIFICANT CHANGE UP (ref 7–23)
CALCIUM SERPL-MCNC: 8.3 MG/DL — LOW (ref 8.4–10.5)
CHLORIDE SERPL-SCNC: 101 MMOL/L — SIGNIFICANT CHANGE UP (ref 96–108)
CO2 SERPL-SCNC: 30 MMOL/L — SIGNIFICANT CHANGE UP (ref 22–31)
CREAT SERPL-MCNC: 1.08 MG/DL — SIGNIFICANT CHANGE UP (ref 0.5–1.3)
CULTURE RESULTS: SIGNIFICANT CHANGE UP
CULTURE RESULTS: SIGNIFICANT CHANGE UP
GLUCOSE SERPL-MCNC: 116 MG/DL — HIGH (ref 70–99)
HCT VFR BLD CALC: 27 % — LOW (ref 39–50)
HGB BLD-MCNC: 8.8 G/DL — LOW (ref 13–17)
MAGNESIUM SERPL-MCNC: 2.1 MG/DL — SIGNIFICANT CHANGE UP (ref 1.6–2.6)
MCHC RBC-ENTMCNC: 32.6 G/DL — SIGNIFICANT CHANGE UP (ref 32–36)
MCHC RBC-ENTMCNC: 35.8 PG — HIGH (ref 27–34)
MCV RBC AUTO: 109.8 FL — HIGH (ref 80–100)
METHOD TYPE: SIGNIFICANT CHANGE UP
ORGANISM # SPEC MICROSCOPIC CNT: SIGNIFICANT CHANGE UP
PHOSPHATE SERPL-MCNC: 2.6 MG/DL — SIGNIFICANT CHANGE UP (ref 2.5–4.5)
PLATELET # BLD AUTO: 283 K/UL — SIGNIFICANT CHANGE UP (ref 150–400)
POTASSIUM SERPL-MCNC: 4.3 MMOL/L — SIGNIFICANT CHANGE UP (ref 3.5–5.3)
POTASSIUM SERPL-SCNC: 4.3 MMOL/L — SIGNIFICANT CHANGE UP (ref 3.5–5.3)
RBC # BLD: 2.46 M/UL — LOW (ref 4.2–5.8)
RBC # FLD: 18.8 % — HIGH (ref 10.3–16.9)
SODIUM SERPL-SCNC: 138 MMOL/L — SIGNIFICANT CHANGE UP (ref 135–145)
SPECIMEN SOURCE: SIGNIFICANT CHANGE UP
SPECIMEN SOURCE: SIGNIFICANT CHANGE UP
WBC # BLD: 3 K/UL — LOW (ref 3.8–10.5)
WBC # FLD AUTO: 3 K/UL — LOW (ref 3.8–10.5)

## 2018-04-28 PROCEDURE — 78806: CPT | Mod: 26

## 2018-04-28 RX ORDER — HEPARIN SODIUM 5000 [USP'U]/ML
800 INJECTION INTRAVENOUS; SUBCUTANEOUS
Qty: 25000 | Refills: 0 | Status: DISCONTINUED | OUTPATIENT
Start: 2018-04-28 | End: 2018-04-28

## 2018-04-28 RX ORDER — HEPARIN SODIUM 5000 [USP'U]/ML
1100 INJECTION INTRAVENOUS; SUBCUTANEOUS
Qty: 25000 | Refills: 0 | Status: DISCONTINUED | OUTPATIENT
Start: 2018-04-28 | End: 2018-04-29

## 2018-04-28 RX ADMIN — ESCITALOPRAM OXALATE 10 MILLIGRAM(S): 10 TABLET, FILM COATED ORAL at 11:36

## 2018-04-28 RX ADMIN — PIPERACILLIN AND TAZOBACTAM 200 GRAM(S): 4; .5 INJECTION, POWDER, LYOPHILIZED, FOR SOLUTION INTRAVENOUS at 01:16

## 2018-04-28 RX ADMIN — PIPERACILLIN AND TAZOBACTAM 200 GRAM(S): 4; .5 INJECTION, POWDER, LYOPHILIZED, FOR SOLUTION INTRAVENOUS at 23:50

## 2018-04-28 RX ADMIN — TEMAZEPAM 30 MILLIGRAM(S): 15 CAPSULE ORAL at 01:15

## 2018-04-28 RX ADMIN — HYDROXYUREA 500 MILLIGRAM(S): 500 CAPSULE ORAL at 23:53

## 2018-04-28 RX ADMIN — PANTOPRAZOLE SODIUM 40 MILLIGRAM(S): 20 TABLET, DELAYED RELEASE ORAL at 06:30

## 2018-04-28 RX ADMIN — Medication 30 MILLILITER(S): at 23:59

## 2018-04-28 RX ADMIN — Medication 100 MILLIGRAM(S): at 06:30

## 2018-04-28 RX ADMIN — Medication 81 MILLIGRAM(S): at 11:36

## 2018-04-28 RX ADMIN — PIPERACILLIN AND TAZOBACTAM 200 GRAM(S): 4; .5 INJECTION, POWDER, LYOPHILIZED, FOR SOLUTION INTRAVENOUS at 17:55

## 2018-04-28 RX ADMIN — PIPERACILLIN AND TAZOBACTAM 200 GRAM(S): 4; .5 INJECTION, POWDER, LYOPHILIZED, FOR SOLUTION INTRAVENOUS at 06:30

## 2018-04-28 RX ADMIN — HEPARIN SODIUM 8 UNIT(S)/HR: 5000 INJECTION INTRAVENOUS; SUBCUTANEOUS at 17:55

## 2018-04-28 RX ADMIN — PIPERACILLIN AND TAZOBACTAM 200 GRAM(S): 4; .5 INJECTION, POWDER, LYOPHILIZED, FOR SOLUTION INTRAVENOUS at 11:36

## 2018-04-28 RX ADMIN — Medication 100 MILLIGRAM(S): at 23:50

## 2018-04-28 RX ADMIN — HEPARIN SODIUM 11 UNIT(S)/HR: 5000 INJECTION INTRAVENOUS; SUBCUTANEOUS at 23:58

## 2018-04-28 RX ADMIN — HYDROXYUREA 500 MILLIGRAM(S): 500 CAPSULE ORAL at 10:01

## 2018-04-28 NOTE — PROGRESS NOTE ADULT - SUBJECTIVE AND OBJECTIVE BOX
ovn: CURTIS, VSS, afebrile.  4/27: Holding Eliquis for Hgb 7.8 (from 8.9), no bloody BMs, will repeat FOBT, had gallium scan x1, off vanco as per ID - zosyn only. Repeat 4 pm hgb was 7.9. ovn: CURTIS, VSS, afebrile.  4/27: Holding Eliquis for Hgb 7.8 (from 8.9), no bloody BMs, will repeat FOBT, had gallium scan x1, off vanco as per ID - zosyn only. Repeat 4 pm hgb was 7.9.        PMH:  Foot drop, left  CAD (coronary artery disease)  GERD (gastroesophageal reflux disease)  Aneurysm      Medication:   piperacillin/tazobactam IVPB. 2.25  aspirin  chewable 81  piperacillin/tazobactam IVPB. 2.25        Vital Signs Last 24 Hrs  T(C): 36.3 (28 Apr 2018 09:23), Max: 36.6 (28 Apr 2018 00:40)  T(F): 97.3 (28 Apr 2018 09:23), Max: 97.8 (28 Apr 2018 00:40)  HR: 58 (28 Apr 2018 11:33) (54 - 81)  BP: 168/72 (28 Apr 2018 11:33) (137/60 - 168/72)  BP(mean): --  RR: 18 (28 Apr 2018 11:33) (16 - 18)  SpO2: 95% (28 Apr 2018 09:03) (94% - 98%)  I&O's Summary    27 Apr 2018 07:01  -  28 Apr 2018 07:00  --------------------------------------------------------  IN: 700 mL / OUT: 0 mL / NET: 700 mL    28 Apr 2018 07:01  -  28 Apr 2018 12:10  --------------------------------------------------------  IN: 0 mL / OUT: 0 mL / NET: 0 mL        PHYSICAL EXAM:    Constitutional: NAD  Neck: supple, no masses, no JVD  Gastrointestinal: soft, NTND, no masses  Extremities: : L groin erythema resolved  Neurological: AAOx3      LABS:                        7.9    3.1   )-----------( 294      ( 27 Apr 2018 16:54 )             24.2     04-27    138  |  100  |  23  ----------------------------<  85  4.8   |  28  |  1.14    Ca    8.2<L>      27 Apr 2018 06:41  Phos  3.3     04-27  Mg     2.2     04-27      PTT - ( 27 Apr 2018 15:45 )  PTT:28.3 sec    Radiology and Additional Studies:

## 2018-04-28 NOTE — PROGRESS NOTE ADULT - SUBJECTIVE AND OBJECTIVE BOX
Pt is a 89 M Hx of CAD x2 s/p stents, HLD, HTN, AS, GI bleed, essential thrombocytosis, PE on Eliquis, AAA (s/p EVAR 11/16/17) who presented to [facility] ED with complain of fever, chills and left groin erythema x 1day and abdominal pain x2wks.  Pt has been having intermittent abdominal pain for about 2wks and today he told his home health aid that he "didn't feel good" and at the time was noted to have fever of 100.8, chills and his left groin EVAR incision site was noted to be erythematous and so patient was brought to the [facility] ED. According Pt's health aid, he appeared a little bit more distended and was nauseous earlier today, but denies diarrhea, constipation, vomiting, chest pain, SOB, dizziness, or dysuria.    Upon arrival to ED pt /62, , Temp 100.9.  Pt received, 1L fluid bolus and responded to fluid bolus.    Past Medical History:  Aneurysm    CAD (coronary artery disease)    Foot drop, left    GERD (gastroesophageal reflux disease).    Past Surgical History:  Endoleak post (EVAR) endovascular aneurysm repair    H/O heart artery stent  x 2  History of back surgery  x 4  History of cholecystectomy.    Pt.S&E@BS in AM         	  MEDICATIONS:    piperacillin/tazobactam IVPB. 2.25 Gram(s) IV Intermittent every 6 hours      escitalopram 10 milliGRAM(s) Oral daily  temazepam 30 milliGRAM(s) Oral at bedtime PRN    bisacodyl 5 milliGRAM(s) Oral every 12 hours PRN  docusate sodium 100 milliGRAM(s) Oral three times a day  pantoprazole    Tablet 40 milliGRAM(s) Oral before breakfast      aspirin  chewable 81 milliGRAM(s) Oral daily  hydroxyurea 500 milliGRAM(s) Oral every 12 hours      Complaint:     Otherwise 12 point review of systems is normal.    PHYSICAL EXAM:    Constitutional: NAD  Eyes: PERRL, EOMI, sclera non-icteric  Neck: supple, no masses, no JVD  Respiratory: CTA b/l, good air entry b/l, no wheezing, rhonchi, rales, +  Cardiovascular: RRR, normal S1S2, no M/R/G  Gastrointestinal: soft, NTND, no masses palpable, BS+  Extremities: :L groin erythema improved  Neurological: AAOx3        ICU Vital Signs Last 24 Hrs  T(C): 36.3 (28 Apr 2018 09:23), Max: 36.6 (28 Apr 2018 00:40)  T(F): 97.3 (28 Apr 2018 09:23), Max: 97.8 (28 Apr 2018 00:40)  HR: 58 (28 Apr 2018 11:33) (54 - 81)  BP: 168/72 (28 Apr 2018 11:33) (137/60 - 168/72)  BP(mean): --  ABP: --  ABP(mean): --  RR: 18 (28 Apr 2018 11:33) (16 - 18)  SpO2: 95% (28 Apr 2018 09:03) (94% - 98%)      ECG:      CHEST X RAY    CT    MRI    MRA    CT ANGIO    CAROTID DUPLEX    DUPLEX     Echocardiogram    Catheterization:    Stress Test:     LABS:	 	  CARDIAC MARKERS:                              8.8    3.0   )-----------( 283      ( 28 Apr 2018 12:43 )             27.0     04-27    138  |  100  |  23  ----------------------------<  85  4.8   |  28  |  1.14    Ca    8.2<L>      27 Apr 2018 06:41  Phos  3.3     04-27  Mg     2.2     04-27      ASSESSMENT/PLAN: 	  89 y/o M with Hx of CAD x2 s/p stents, HLD, HTN, AS, GI bleed, PE on Eliquis, AAA (s/p EVAR 11/16/17) now with endoleak and 1.0 cm penetrating atherosclerotic ulcer and left groin cellulitis     Streptococcal bacteremia.

## 2018-04-28 NOTE — PROVIDER CONTACT NOTE (OTHER) - SITUATION
pt c/o of 3/10  general abdominal pain, aching constant pain. pt reports he does not think it is gas related

## 2018-04-28 NOTE — PROGRESS NOTE ADULT - ASSESSMENT
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

## 2018-04-29 LAB
APTT BLD: 32 SEC — SIGNIFICANT CHANGE UP (ref 27.5–37.4)
APTT BLD: 60 SEC — HIGH (ref 27.5–37.4)
APTT BLD: 90.3 SEC — HIGH (ref 27.5–37.4)
HCT VFR BLD CALC: 24.4 % — LOW (ref 39–50)
HGB BLD-MCNC: 7.9 G/DL — LOW (ref 13–17)
MCHC RBC-ENTMCNC: 32.4 G/DL — SIGNIFICANT CHANGE UP (ref 32–36)
MCHC RBC-ENTMCNC: 36.6 PG — HIGH (ref 27–34)
MCV RBC AUTO: 113 FL — HIGH (ref 80–100)
PLATELET # BLD AUTO: 315 K/UL — SIGNIFICANT CHANGE UP (ref 150–400)
RBC # BLD: 2.16 M/UL — LOW (ref 4.2–5.8)
RBC # FLD: 17.7 % — HIGH (ref 10.3–16.9)
WBC # BLD: 3.4 K/UL — LOW (ref 3.8–10.5)
WBC # FLD AUTO: 3.4 K/UL — LOW (ref 3.8–10.5)

## 2018-04-29 RX ORDER — HEPARIN SODIUM 5000 [USP'U]/ML
1400 INJECTION INTRAVENOUS; SUBCUTANEOUS
Qty: 25000 | Refills: 0 | Status: DISCONTINUED | OUTPATIENT
Start: 2018-04-29 | End: 2018-04-30

## 2018-04-29 RX ADMIN — ESCITALOPRAM OXALATE 10 MILLIGRAM(S): 10 TABLET, FILM COATED ORAL at 11:35

## 2018-04-29 RX ADMIN — Medication 100 MILLIGRAM(S): at 13:57

## 2018-04-29 RX ADMIN — Medication 100 MILLIGRAM(S): at 06:35

## 2018-04-29 RX ADMIN — PIPERACILLIN AND TAZOBACTAM 200 GRAM(S): 4; .5 INJECTION, POWDER, LYOPHILIZED, FOR SOLUTION INTRAVENOUS at 11:36

## 2018-04-29 RX ADMIN — PANTOPRAZOLE SODIUM 40 MILLIGRAM(S): 20 TABLET, DELAYED RELEASE ORAL at 06:35

## 2018-04-29 RX ADMIN — HEPARIN SODIUM 14 UNIT(S)/HR: 5000 INJECTION INTRAVENOUS; SUBCUTANEOUS at 09:52

## 2018-04-29 RX ADMIN — HYDROXYUREA 500 MILLIGRAM(S): 500 CAPSULE ORAL at 21:59

## 2018-04-29 RX ADMIN — TEMAZEPAM 30 MILLIGRAM(S): 15 CAPSULE ORAL at 21:59

## 2018-04-29 RX ADMIN — Medication 81 MILLIGRAM(S): at 11:35

## 2018-04-29 RX ADMIN — Medication 100 MILLIGRAM(S): at 21:59

## 2018-04-29 RX ADMIN — Medication 30 MILLILITER(S): at 14:59

## 2018-04-29 RX ADMIN — HYDROXYUREA 500 MILLIGRAM(S): 500 CAPSULE ORAL at 11:35

## 2018-04-29 RX ADMIN — PIPERACILLIN AND TAZOBACTAM 200 GRAM(S): 4; .5 INJECTION, POWDER, LYOPHILIZED, FOR SOLUTION INTRAVENOUS at 17:50

## 2018-04-29 RX ADMIN — PIPERACILLIN AND TAZOBACTAM 200 GRAM(S): 4; .5 INJECTION, POWDER, LYOPHILIZED, FOR SOLUTION INTRAVENOUS at 06:35

## 2018-04-29 NOTE — PROGRESS NOTE ADULT - ASSESSMENT
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

## 2018-04-29 NOTE — PROGRESS NOTE ADULT - ASSESSMENT
89 y/o M with Hx of CAD x2 s/p stents, HLD, HTN, AS, GI bleed, PE on Eliquis, AAA (s/p EVAR 11/16/17) now with endoleak and 1.0 cm penetrating atherosclerotic ulcer and left groin cellulitis    continue antibiotics. heparin per vascular

## 2018-04-29 NOTE — PHYSICAL THERAPY INITIAL EVALUATION ADULT - LIVES WITH, PROFILE
has 24/7 assist from 2 aids, apartment, elevator, 3 steps, household ambulatory, wheelchair community, no falls in past year

## 2018-04-29 NOTE — PHYSICAL THERAPY INITIAL EVALUATION ADULT - GENERAL OBSERVATIONS, REHAB EVAL
Patient received supine complaints of abdominal pain +EKG, O2 NC 2L, IV hep. Left in chair +RN Hamzah aware, call orourke in reach

## 2018-04-29 NOTE — PROGRESS NOTE ADULT - SUBJECTIVE AND OBJECTIVE BOX
o/n: 10PM ptt: 35.5; inc. heparin to 11, CURTIS, VSS  4/28: L groin cellulitis resolved, no erythema; Gallium 2nd phase complete; hgb stable in AM; restart heparin gtt for hx of PE; blood cx's remain negative o/n: 10PM ptt: 35.5; inc. heparin to 11, CURTIS, VSS  4/28: L groin cellulitis resolved, no erythema; Gallium 2nd phase complete; hgb stable in AM; restart heparin gtt for hx of PE; blood cx's remain negative    piperacillin/tazobactam IVPB. 2.25  aspirin  chewable 81  heparin  Infusion 1100  piperacillin/tazobactam IVPB. 2.25        Vital Signs Last 24 Hrs  T(C): 36.2 (29 Apr 2018 06:14), Max: 36.9 (28 Apr 2018 21:38)  T(F): 97.2 (29 Apr 2018 06:14), Max: 98.4 (28 Apr 2018 21:38)  HR: 64 (29 Apr 2018 05:56) (54 - 64)  BP: 176/72 (29 Apr 2018 05:56) (122/58 - 176/72)  BP(mean): --  RR: 16 (29 Apr 2018 05:56) (16 - 18)  SpO2: 98% (29 Apr 2018 05:56) (95% - 98%)  I&O's Summary    28 Apr 2018 07:01  -  29 Apr 2018 07:00  --------------------------------------------------------  IN: 396 mL / OUT: 0 mL / NET: 396 mL        Physical Exam:  General: NAD, resting comfortably in bed  Pulmonary: Nonlabored breathing, no respiratory distress  Neck: supple, no masses, no JVD  Gastrointestinal: soft, NTND, no masses  Extremities: : L groin erythema resolved  Neurological: AAOx3      LABS:                        8.8    3.0   )-----------( 283      ( 28 Apr 2018 12:43 )             27.0     04-28    138  |  101  |  20  ----------------------------<  116<H>  4.3   |  30  |  1.08    Ca    8.3<L>      28 Apr 2018 12:43  Phos  2.6     04-28  Mg     2.1     04-28      PTT - ( 29 Apr 2018 06:48 )  PTT:32.0 sec    Radiology and Additional Studies:

## 2018-04-29 NOTE — PROGRESS NOTE ADULT - SUBJECTIVE AND OBJECTIVE BOX
Pt seen and examined  No complaints    REVIEW OF SYSTEMS:  Constitutional: No fever,   Cardiovascular: No chest pain, palpitations, dizziness or leg swelling  Gastrointestinal: No abdominal or epigastric pain. No nausea, vomiting or hematemesis; No diarrhea or constipation. No melena or hematochezia.  Skin: No itching, burning, rashes or lesions       MEDICATIONS:  MEDICATIONS  (STANDING):  aspirin  chewable 81 milliGRAM(s) Oral daily  docusate sodium 100 milliGRAM(s) Oral three times a day  escitalopram 10 milliGRAM(s) Oral daily  heparin  Infusion 1400 Unit(s)/Hr (14 mL/Hr) IV Continuous <Continuous>  hydroxyurea 500 milliGRAM(s) Oral every 12 hours  pantoprazole    Tablet 40 milliGRAM(s) Oral before breakfast  piperacillin/tazobactam IVPB. 2.25 Gram(s) IV Intermittent every 6 hours    MEDICATIONS  (PRN):  bisacodyl 5 milliGRAM(s) Oral every 12 hours PRN Constipation  temazepam 30 milliGRAM(s) Oral at bedtime PRN Insomnia      Allergies    No Known Allergies    Intolerances        Vital Signs Last 24 Hrs  T(C): 37 (29 Apr 2018 09:59), Max: 37 (29 Apr 2018 09:59)  T(F): 98.6 (29 Apr 2018 09:59), Max: 98.6 (29 Apr 2018 09:59)  HR: 65 (29 Apr 2018 11:40) (61 - 78)  BP: 146/67 (29 Apr 2018 11:40) (122/58 - 176/72)  BP(mean): --  RR: 16 (29 Apr 2018 11:40) (16 - 16)  SpO2: 97% (29 Apr 2018 11:40) (96% - 98%)    04-28 @ 07:01 - 04-29 @ 07:00  --------------------------------------------------------  IN: 396 mL / OUT: 0 mL / NET: 396 mL    04-29 @ 07:01 - 04-29 @ 12:48  --------------------------------------------------------  IN: 180 mL / OUT: 0 mL / NET: 180 mL        PHYSICAL EXAM:    General: underweight in no acute distress  HEENT: MMM, conjunctiva and sclera clear  Lungs: clear  Heart: regular  Gastrointestinal: Soft non-tender non-distended; Normal bowel sounds; No hepatosplenomegaly  Skin: Warm and dry. No obvious rash  Ext: no edema    LABS:      CBC Full  -  ( 28 Apr 2018 12:43 )  WBC Count : 3.0 K/uL  Hemoglobin : 8.8 g/dL  Hematocrit : 27.0 %  Platelet Count - Automated : 283 K/uL  Mean Cell Volume : 109.8 fL  Mean Cell Hemoglobin : 35.8 pg  Mean Cell Hemoglobin Concentration : 32.6 g/dL  Auto Neutrophil # : x  Auto Lymphocyte # : x  Auto Monocyte # : x  Auto Eosinophil # : x  Auto Basophil # : x  Auto Neutrophil % : x  Auto Lymphocyte % : x  Auto Monocyte % : x  Auto Eosinophil % : x  Auto Basophil % : x    04-28    138  |  101  |  20  ----------------------------<  116<H>  4.3   |  30  |  1.08    Ca    8.3<L>      28 Apr 2018 12:43  Phos  2.6     04-28  Mg     2.1     04-28      PTT - ( 29 Apr 2018 06:48 )  PTT:32.0 sec                  RADIOLOGY & ADDITIONAL STUDIES (The following images were personally reviewed):

## 2018-04-29 NOTE — PHYSICAL THERAPY INITIAL EVALUATION ADULT - PERTINENT HX OF CURRENT PROBLEM, REHAB EVAL
89M who presented to [facility] ED with complain of fever, chills and left groin erythema x 1day and abdominal pain x2wks.

## 2018-04-29 NOTE — PHYSICAL THERAPY INITIAL EVALUATION ADULT - CRITERIA FOR SKILLED THERAPEUTIC INTERVENTIONS
anticipated discharge recommendation/therapy frequency/impairments found/functional limitations in following categories/rehab potential

## 2018-04-30 DIAGNOSIS — D72.819 DECREASED WHITE BLOOD CELL COUNT, UNSPECIFIED: ICD-10-CM

## 2018-04-30 LAB
ANION GAP SERPL CALC-SCNC: 9 MMOL/L — SIGNIFICANT CHANGE UP (ref 5–17)
APTT BLD: 103 SEC — HIGH (ref 27.5–37.4)
APTT BLD: 162.7 SEC — CRITICAL HIGH (ref 27.5–37.4)
APTT BLD: 45 SEC — HIGH (ref 27.5–37.4)
APTT BLD: 97.8 SEC — HIGH (ref 27.5–37.4)
BASOPHILS NFR BLD AUTO: 0.4 % — SIGNIFICANT CHANGE UP (ref 0–2)
BUN SERPL-MCNC: 17 MG/DL — SIGNIFICANT CHANGE UP (ref 7–23)
CALCIUM SERPL-MCNC: 8.5 MG/DL — SIGNIFICANT CHANGE UP (ref 8.4–10.5)
CHLORIDE SERPL-SCNC: 99 MMOL/L — SIGNIFICANT CHANGE UP (ref 96–108)
CO2 SERPL-SCNC: 32 MMOL/L — HIGH (ref 22–31)
CREAT SERPL-MCNC: 1.15 MG/DL — SIGNIFICANT CHANGE UP (ref 0.5–1.3)
EOSINOPHIL NFR BLD AUTO: 3.6 % — SIGNIFICANT CHANGE UP (ref 0–6)
GLUCOSE SERPL-MCNC: 116 MG/DL — HIGH (ref 70–99)
HCT VFR BLD CALC: 26.6 % — LOW (ref 39–50)
HGB BLD-MCNC: 8.4 G/DL — LOW (ref 13–17)
LYMPHOCYTES # BLD AUTO: 17.5 % — SIGNIFICANT CHANGE UP (ref 13–44)
MAGNESIUM SERPL-MCNC: 2.2 MG/DL — SIGNIFICANT CHANGE UP (ref 1.6–2.6)
MCHC RBC-ENTMCNC: 31.6 G/DL — LOW (ref 32–36)
MCHC RBC-ENTMCNC: 35.9 PG — HIGH (ref 27–34)
MCV RBC AUTO: 113.7 FL — HIGH (ref 80–100)
MONOCYTES NFR BLD AUTO: 14.3 % — HIGH (ref 2–14)
NEUTROPHILS NFR BLD AUTO: 64.2 % — SIGNIFICANT CHANGE UP (ref 43–77)
PHOSPHATE SERPL-MCNC: 3 MG/DL — SIGNIFICANT CHANGE UP (ref 2.5–4.5)
PLATELET # BLD AUTO: 347 K/UL — SIGNIFICANT CHANGE UP (ref 150–400)
POTASSIUM SERPL-MCNC: 4.8 MMOL/L — SIGNIFICANT CHANGE UP (ref 3.5–5.3)
POTASSIUM SERPL-SCNC: 4.8 MMOL/L — SIGNIFICANT CHANGE UP (ref 3.5–5.3)
RBC # BLD: 2.34 M/UL — LOW (ref 4.2–5.8)
RBC # FLD: 17.4 % — HIGH (ref 10.3–16.9)
SODIUM SERPL-SCNC: 140 MMOL/L — SIGNIFICANT CHANGE UP (ref 135–145)
WBC # BLD: 2.9 K/UL — LOW (ref 3.8–10.5)
WBC # FLD AUTO: 2.9 K/UL — LOW (ref 3.8–10.5)

## 2018-04-30 PROCEDURE — 78807: CPT | Mod: 26

## 2018-04-30 RX ORDER — HEPARIN SODIUM 5000 [USP'U]/ML
1300 INJECTION INTRAVENOUS; SUBCUTANEOUS
Qty: 25000 | Refills: 0 | Status: DISCONTINUED | OUTPATIENT
Start: 2018-04-30 | End: 2018-04-30

## 2018-04-30 RX ORDER — HEPARIN SODIUM 5000 [USP'U]/ML
1300 INJECTION INTRAVENOUS; SUBCUTANEOUS
Qty: 25000 | Refills: 0 | Status: DISCONTINUED | OUTPATIENT
Start: 2018-04-30 | End: 2018-05-01

## 2018-04-30 RX ORDER — HEPARIN SODIUM 5000 [USP'U]/ML
1500 INJECTION INTRAVENOUS; SUBCUTANEOUS
Qty: 25000 | Refills: 0 | Status: DISCONTINUED | OUTPATIENT
Start: 2018-04-30 | End: 2018-04-30

## 2018-04-30 RX ADMIN — PANTOPRAZOLE SODIUM 40 MILLIGRAM(S): 20 TABLET, DELAYED RELEASE ORAL at 06:06

## 2018-04-30 RX ADMIN — HYDROXYUREA 500 MILLIGRAM(S): 500 CAPSULE ORAL at 09:55

## 2018-04-30 RX ADMIN — TEMAZEPAM 30 MILLIGRAM(S): 15 CAPSULE ORAL at 22:14

## 2018-04-30 RX ADMIN — HEPARIN SODIUM 13 UNIT(S)/HR: 5000 INJECTION INTRAVENOUS; SUBCUTANEOUS at 20:20

## 2018-04-30 RX ADMIN — Medication 100 MILLIGRAM(S): at 06:06

## 2018-04-30 RX ADMIN — Medication 81 MILLIGRAM(S): at 11:40

## 2018-04-30 RX ADMIN — ESCITALOPRAM OXALATE 10 MILLIGRAM(S): 10 TABLET, FILM COATED ORAL at 11:40

## 2018-04-30 RX ADMIN — PIPERACILLIN AND TAZOBACTAM 200 GRAM(S): 4; .5 INJECTION, POWDER, LYOPHILIZED, FOR SOLUTION INTRAVENOUS at 11:51

## 2018-04-30 RX ADMIN — PIPERACILLIN AND TAZOBACTAM 200 GRAM(S): 4; .5 INJECTION, POWDER, LYOPHILIZED, FOR SOLUTION INTRAVENOUS at 06:06

## 2018-04-30 RX ADMIN — Medication 100 MILLIGRAM(S): at 13:49

## 2018-04-30 RX ADMIN — PIPERACILLIN AND TAZOBACTAM 200 GRAM(S): 4; .5 INJECTION, POWDER, LYOPHILIZED, FOR SOLUTION INTRAVENOUS at 18:46

## 2018-04-30 RX ADMIN — HEPARIN SODIUM 15 UNIT(S)/HR: 5000 INJECTION INTRAVENOUS; SUBCUTANEOUS at 10:34

## 2018-04-30 RX ADMIN — Medication 100 MILLIGRAM(S): at 22:14

## 2018-04-30 RX ADMIN — PIPERACILLIN AND TAZOBACTAM 200 GRAM(S): 4; .5 INJECTION, POWDER, LYOPHILIZED, FOR SOLUTION INTRAVENOUS at 00:50

## 2018-04-30 RX ADMIN — HYDROXYUREA 500 MILLIGRAM(S): 500 CAPSULE ORAL at 22:13

## 2018-04-30 NOTE — PROGRESS NOTE ADULT - SUBJECTIVE AND OBJECTIVE BOX
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 2.25  aspirin  chewable 81  heparin  Infusion 1300  piperacillin/tazobactam IVPB. 2.25        Vital Signs Last 24 Hrs  T(C): 36.7 (30 Apr 2018 06:06), Max: 37 (29 Apr 2018 09:59)  T(F): 98.1 (30 Apr 2018 06:06), Max: 98.6 (29 Apr 2018 09:59)  HR: 60 (30 Apr 2018 05:30) (55 - 78)  BP: 150/76 (30 Apr 2018 05:30) (126/65 - 159/68)  BP(mean): --  RR: 18 (30 Apr 2018 05:30) (16 - 18)  SpO2: 98% (30 Apr 2018 05:30) (97% - 98%)  I&O's Summary    29 Apr 2018 07:01  -  30 Apr 2018 07:00  --------------------------------------------------------  IN: 536 mL / OUT: 350 mL / NET: 186 mL        Physical Exam:  General: NAD, resting comfortably in bed  Pulmonary: Nonlabored breathing, no respiratory distress  Gastrointestinal: soft, NTND  Extremities: : L groin erythema resolved  Neurological: AAOx3      LABS:                        7.9    3.4   )-----------( 315      ( 29 Apr 2018 15:30 )             24.4     04-28    138  |  101  |  20  ----------------------------<  116<H>  4.3   |  30  |  1.08    Ca    8.3<L>      28 Apr 2018 12:43  Phos  2.6     04-28  Mg     2.1     04-28      PTT - ( 30 Apr 2018 00:08 )  PTT:97.8 sec    Radiology and Additional Studies:    Assessment and Plan:     Assessment/Plan:  89 y/o M with Hx of CAD x2 s/p stents, HLD, HTN, AS, GI bleed, PE on Eliquis, AAA (s/p EVAR 11/16/17) now with endoleak and 1.0 cm penetrating atherosclerotic ulcer and left groin cellulitis    Zosyn  Home medication  regular diet  F/u AM labs  f/u Gallium Scan read  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

## 2018-04-30 NOTE — PROGRESS NOTE ADULT - ASSESSMENT
preliminary Gallium scan  results show no increased uptake in right groin  There is some uptake in the lungs

## 2018-04-30 NOTE — PROGRESS NOTE ADULT - SUBJECTIVE AND OBJECTIVE BOX
INTERVAL HPI/OVERNIGHT EVENTS:    ANTIBIOTICS    MEDICATIONS  (STANDING):  aspirin  chewable 81 milliGRAM(s) Oral daily  docusate sodium 100 milliGRAM(s) Oral three times a day  escitalopram 10 milliGRAM(s) Oral daily  heparin  Infusion 1500 Unit(s)/Hr (15 mL/Hr) IV Continuous <Continuous>  hydroxyurea 500 milliGRAM(s) Oral every 12 hours  pantoprazole    Tablet 40 milliGRAM(s) Oral before breakfast  piperacillin/tazobactam IVPB. 2.25 Gram(s) IV Intermittent every 6 hours    MEDICATIONS  (PRN):  bisacodyl 5 milliGRAM(s) Oral every 12 hours PRN Constipation  temazepam 30 milliGRAM(s) Oral at bedtime PRN Insomnia      Allergies    No Known Allergies    Intolerances        REVIEW OF SYSTEMS:    Constitutional: No fever, weight loss or fatigue  Eyes: No eye pain, visual disturbances, or discharge  ENMT:  No difficulty hearing, tinnitus, vertigo; No sinus or throat pain  Neck: No pain or stiffness  Respiratory: No cough, wheezing, chills or hemoptysis  Cardiovascular: No chest pain, palpitations, shortness of breath, dizziness or leg swelling  Gastrointestinal: No abdominal or epigastric pain. No nausea, vomiting or hematemesis; No diarrhea or constipation. No melena or hematochezia.  Genitourinary: No dysuria, frequency, hematuria or incontinence  Rectal: No pain, hemorrhoids or incontinence    Vital Signs Last 24 Hrs  T(C): 36.9 (30 Apr 2018 08:50), Max: 36.9 (30 Apr 2018 08:50)  T(F): 98.4 (30 Apr 2018 08:50), Max: 98.4 (30 Apr 2018 08:50)  HR: 76 (30 Apr 2018 12:44) (55 - 76)  BP: 121/56 (30 Apr 2018 12:44) (121/56 - 159/68)  BP(mean): --  RR: 17 (30 Apr 2018 12:44) (16 - 18)  SpO2: 99% (30 Apr 2018 12:44) (97% - 99%)    PHYSICAL EXAM:    General: Well developed; well nourished; in no acute distress  Eyes: PERRL, EOM intact; conjunctiva and sclera clear  Head: Normocephalic; atraumatic  ENMT: No nasal discharge; airway clear  Neck: Supple; non tender; no masses  Respiratory: No wheezes, rales or rhonchi  Cardiovascular: Regular rate and rhythm. S1 and S2 Normal; No murmurs, gallops or rubs  Gastrointestinal: Soft non-tender non-distended; Normal bowel sounds; No hepatosplenomegaly  Genitourinary: No costovertebral angle tenderness  Extremities: Normal range of motion, No clubbing, cyanosis or edema    no redness or swelling over right groin, no fluctuance  Vascular: Peripheral pulses palpable 2+ bilaterally  Neurological: Alert and oriented x3  Skin: Warm and dry. No acute rash  Lymph Nodes: No acute cervical adenopathy  Musculoskeletal: Normal gait, tone, without deformities    LABS:                        8.4    2.9   )-----------( 347      ( 30 Apr 2018 07:24 )             26.6     04-30    140  |  99  |  17  ----------------------------<  116<H>  4.8   |  32<H>  |  1.15    Ca    8.5      30 Apr 2018 07:24  Phos  3.0     04-30  Mg     2.2     04-30      PTT - ( 30 Apr 2018 08:35 )  PTT:45.0 sec        MICROBIOLOGY:  Culture Results:   No growth at 4 days. (04-26 @ 11:39)  Culture Results:   No growth at 4 days. (04-26 @ 11:39)  Culture Results:   Growth in aerobic and anaerobic bottles: Streptococcus agalactiae (Group  B) (04-25 @ 09:26)  Culture Results:   Growth in aerobic and anaerobic bottles: Streptococcus agalactiae (Group  B) (04-25 @ 09:26)  Culture Results:   No growth (04-25 @ 08:44)      RADIOLOGY & ADDITIONAL STUDIES:

## 2018-04-30 NOTE — PROGRESS NOTE ADULT - SUBJECTIVE AND OBJECTIVE BOX
Pt is a 89 M Hx of CAD x2 s/p stents, HLD, HTN, AS, GI bleed, essential thrombocytosis, PE on Eliquis, AAA (s/p EVAR 11/16/17) who presented to [facility] ED with complain of fever, chills and left groin erythema x 1day and abdominal pain x2wks.  Pt has been having intermittent abdominal pain for about 2wks and today he told his home health aid that he "didn't feel good" and at the time was noted to have fever of 100.8, chills and his left groin EVAR incision site was noted to be erythematous and so patient was brought to the [facility] ED. According Pt's health aid, he appeared a little bit more distended and was nauseous earlier today, but denies diarrhea, constipation, vomiting, chest pain, SOB, dizziness, or dysuria.    Upon arrival to ED pt /62, , Temp 100.9.  Pt received, 1L fluid bolus and responded to fluid bolus.    Past Medical History:  Aneurysm    CAD (coronary artery disease)    Foot drop, left    GERD (gastroesophageal reflux disease).    Past Surgical History:  Endoleak post (EVAR) endovascular aneurysm repair    H/O heart artery stent  x 2  History of back surgery  x 4  History of cholecystectomy.    Pt.S&E@BS in AM           	  MEDICATIONS:    piperacillin/tazobactam IVPB. 2.25 Gram(s) IV Intermittent every 6 hours      escitalopram 10 milliGRAM(s) Oral daily  temazepam 30 milliGRAM(s) Oral at bedtime PRN    bisacodyl 5 milliGRAM(s) Oral every 12 hours PRN  docusate sodium 100 milliGRAM(s) Oral three times a day  pantoprazole    Tablet 40 milliGRAM(s) Oral before breakfast      aspirin  chewable 81 milliGRAM(s) Oral daily  heparin  Infusion 1500 Unit(s)/Hr IV Continuous <Continuous>  hydroxyurea 500 milliGRAM(s) Oral every 12 hours      Complaint:     Otherwise 12 point review of systems is normal.    PHYSICAL EXAM:    Constitutional: NAD  Eyes: PERRL, EOMI, sclera non-icteric  Neck: supple, no masses, no JVD  Respiratory: CTA b/l, good air entry b/l, no wheezing, rhonchi, rales, +  Cardiovascular: RRR, normal S1S2, no M/R/G  Gastrointestinal: soft, NTND, no masses palpable, BS+  Extremities: :L groin erythema resolved  Neurological: AAOx3          ICU Vital Signs Last 24 Hrs  T(C): 36.3 (30 Apr 2018 13:22), Max: 36.9 (30 Apr 2018 08:50)  T(F): 97.4 (30 Apr 2018 13:22), Max: 98.4 (30 Apr 2018 08:50)  HR: 73 (30 Apr 2018 18:00) (55 - 76)  BP: 173/73 (30 Apr 2018 18:00) (121/56 - 173/73)  BP(mean): --  ABP: --  ABP(mean): --  RR: 18 (30 Apr 2018 18:00) (17 - 18)  SpO2: 99% (30 Apr 2018 12:44) (98% - 99%)      ECG:      CHEST X RAY    CT    MRI    MRA    CT ANGIO    CAROTID DUPLEX    DUPLEX     Echocardiogram    Catheterization:    Stress Test:     LABS:	 	  CARDIAC MARKERS:                              8.4    2.9   )-----------( 347      ( 30 Apr 2018 07:24 )             26.6     04-30    140  |  99  |  17  ----------------------------<  116<H>  4.8   |  32<H>  |  1.15    Ca    8.5      30 Apr 2018 07:24  Phos  3.0     04-30  Mg     2.2     04-30    ASSESSMENT/PLAN: 	  89 y/o M with Hx of CAD x2 s/p stents, HLD, HTN, AS, GI bleed, PE on Eliquis, AAA (s/p EVAR 11/16/17) now with endoleak and 1.0 cm penetrating atherosclerotic ulcer and left groin cellulitis    preliminary Gallium scan  results show no increased uptake in right groin  There is some uptake in the lungs    Problem/Plan - 1:  ·  Problem: Streptococcal bacteremia.   -Continue Abxs.

## 2018-05-01 ENCOUNTER — TRANSCRIPTION ENCOUNTER (OUTPATIENT)
Age: 83
End: 2018-05-01

## 2018-05-01 LAB
ALBUMIN SERPL ELPH-MCNC: 3.1 G/DL — LOW (ref 3.3–5)
ALP SERPL-CCNC: 61 U/L — SIGNIFICANT CHANGE UP (ref 40–120)
ALT FLD-CCNC: 8 U/L — LOW (ref 10–45)
ANION GAP SERPL CALC-SCNC: 6 MMOL/L — SIGNIFICANT CHANGE UP (ref 5–17)
APTT BLD: 77.1 SEC — HIGH (ref 27.5–37.4)
APTT BLD: 83.6 SEC — HIGH (ref 27.5–37.4)
AST SERPL-CCNC: 19 U/L — SIGNIFICANT CHANGE UP (ref 10–40)
BASOPHILS NFR BLD AUTO: 0.8 % — SIGNIFICANT CHANGE UP (ref 0–2)
BILIRUB DIRECT SERPL-MCNC: <0.2 MG/DL — SIGNIFICANT CHANGE UP (ref 0–0.2)
BILIRUB INDIRECT FLD-MCNC: >0.2 MG/DL — SIGNIFICANT CHANGE UP (ref 0.2–1)
BILIRUB SERPL-MCNC: 0.4 MG/DL — SIGNIFICANT CHANGE UP (ref 0.2–1.2)
BUN SERPL-MCNC: 17 MG/DL — SIGNIFICANT CHANGE UP (ref 7–23)
CALCIUM SERPL-MCNC: 8.8 MG/DL — SIGNIFICANT CHANGE UP (ref 8.4–10.5)
CHLORIDE SERPL-SCNC: 96 MMOL/L — SIGNIFICANT CHANGE UP (ref 96–108)
CO2 SERPL-SCNC: 32 MMOL/L — HIGH (ref 22–31)
CREAT SERPL-MCNC: 1.12 MG/DL — SIGNIFICANT CHANGE UP (ref 0.5–1.3)
CULTURE RESULTS: SIGNIFICANT CHANGE UP
CULTURE RESULTS: SIGNIFICANT CHANGE UP
EOSINOPHIL NFR BLD AUTO: 3.6 % — SIGNIFICANT CHANGE UP (ref 0–6)
GLUCOSE SERPL-MCNC: 103 MG/DL — HIGH (ref 70–99)
HCT VFR BLD CALC: 27 % — LOW (ref 39–50)
HGB BLD-MCNC: 8.8 G/DL — LOW (ref 13–17)
LYMPHOCYTES # BLD AUTO: 20.4 % — SIGNIFICANT CHANGE UP (ref 13–44)
MAGNESIUM SERPL-MCNC: 2.3 MG/DL — SIGNIFICANT CHANGE UP (ref 1.6–2.6)
MCHC RBC-ENTMCNC: 32.6 G/DL — SIGNIFICANT CHANGE UP (ref 32–36)
MCHC RBC-ENTMCNC: 36.8 PG — HIGH (ref 27–34)
MCV RBC AUTO: 113 FL — HIGH (ref 80–100)
MONOCYTES NFR BLD AUTO: 15.6 % — HIGH (ref 2–14)
NEUTROPHILS NFR BLD AUTO: 59.6 % — SIGNIFICANT CHANGE UP (ref 43–77)
PHOSPHATE SERPL-MCNC: 3.2 MG/DL — SIGNIFICANT CHANGE UP (ref 2.5–4.5)
PLATELET # BLD AUTO: 348 K/UL — SIGNIFICANT CHANGE UP (ref 150–400)
POTASSIUM SERPL-MCNC: 4.8 MMOL/L — SIGNIFICANT CHANGE UP (ref 3.5–5.3)
POTASSIUM SERPL-SCNC: 4.8 MMOL/L — SIGNIFICANT CHANGE UP (ref 3.5–5.3)
PROT SERPL-MCNC: 6.8 G/DL — SIGNIFICANT CHANGE UP (ref 6–8.3)
RBC # BLD: 2.39 M/UL — LOW (ref 4.2–5.8)
RBC # FLD: 17.1 % — HIGH (ref 10.3–16.9)
SODIUM SERPL-SCNC: 134 MMOL/L — LOW (ref 135–145)
SPECIMEN SOURCE: SIGNIFICANT CHANGE UP
SPECIMEN SOURCE: SIGNIFICANT CHANGE UP
WBC # BLD: 2.5 K/UL — LOW (ref 3.8–10.5)
WBC # FLD AUTO: 2.5 K/UL — LOW (ref 3.8–10.5)

## 2018-05-01 RX ORDER — APIXABAN 2.5 MG/1
5 TABLET, FILM COATED ORAL EVERY 12 HOURS
Qty: 0 | Refills: 0 | Status: DISCONTINUED | OUTPATIENT
Start: 2018-05-01 | End: 2018-05-02

## 2018-05-01 RX ORDER — TEMAZEPAM 15 MG/1
30 CAPSULE ORAL AT BEDTIME
Qty: 0 | Refills: 0 | Status: DISCONTINUED | OUTPATIENT
Start: 2018-05-01 | End: 2018-05-02

## 2018-05-01 RX ORDER — AMPICILLIN TRIHYDRATE 250 MG
CAPSULE ORAL
Qty: 0 | Refills: 0 | Status: DISCONTINUED | OUTPATIENT
Start: 2018-05-01 | End: 2018-05-02

## 2018-05-01 RX ORDER — ASPIRIN/CALCIUM CARB/MAGNESIUM 324 MG
81 TABLET ORAL DAILY
Qty: 0 | Refills: 0 | Status: DISCONTINUED | OUTPATIENT
Start: 2018-05-01 | End: 2018-05-02

## 2018-05-01 RX ORDER — AMPICILLIN TRIHYDRATE 250 MG
2 CAPSULE ORAL ONCE
Qty: 0 | Refills: 0 | Status: COMPLETED | OUTPATIENT
Start: 2018-05-01 | End: 2018-05-01

## 2018-05-01 RX ORDER — AMPICILLIN TRIHYDRATE 250 MG
2 CAPSULE ORAL EVERY 6 HOURS
Qty: 0 | Refills: 0 | Status: DISCONTINUED | OUTPATIENT
Start: 2018-05-01 | End: 2018-05-02

## 2018-05-01 RX ADMIN — PIPERACILLIN AND TAZOBACTAM 200 GRAM(S): 4; .5 INJECTION, POWDER, LYOPHILIZED, FOR SOLUTION INTRAVENOUS at 00:02

## 2018-05-01 RX ADMIN — PIPERACILLIN AND TAZOBACTAM 200 GRAM(S): 4; .5 INJECTION, POWDER, LYOPHILIZED, FOR SOLUTION INTRAVENOUS at 12:15

## 2018-05-01 RX ADMIN — Medication 216 GRAM(S): at 19:42

## 2018-05-01 RX ADMIN — Medication 100 MILLIGRAM(S): at 06:19

## 2018-05-01 RX ADMIN — PIPERACILLIN AND TAZOBACTAM 200 GRAM(S): 4; .5 INJECTION, POWDER, LYOPHILIZED, FOR SOLUTION INTRAVENOUS at 06:19

## 2018-05-01 RX ADMIN — TEMAZEPAM 30 MILLIGRAM(S): 15 CAPSULE ORAL at 22:57

## 2018-05-01 RX ADMIN — APIXABAN 5 MILLIGRAM(S): 2.5 TABLET, FILM COATED ORAL at 14:49

## 2018-05-01 RX ADMIN — Medication 100 MILLIGRAM(S): at 14:49

## 2018-05-01 RX ADMIN — Medication 81 MILLIGRAM(S): at 12:15

## 2018-05-01 RX ADMIN — PANTOPRAZOLE SODIUM 40 MILLIGRAM(S): 20 TABLET, DELAYED RELEASE ORAL at 06:19

## 2018-05-01 RX ADMIN — Medication 216 GRAM(S): at 14:59

## 2018-05-01 RX ADMIN — ESCITALOPRAM OXALATE 10 MILLIGRAM(S): 10 TABLET, FILM COATED ORAL at 12:15

## 2018-05-01 RX ADMIN — HYDROXYUREA 500 MILLIGRAM(S): 500 CAPSULE ORAL at 22:45

## 2018-05-01 RX ADMIN — Medication 100 MILLIGRAM(S): at 22:43

## 2018-05-01 RX ADMIN — HYDROXYUREA 500 MILLIGRAM(S): 500 CAPSULE ORAL at 10:21

## 2018-05-01 NOTE — PROGRESS NOTE ADULT - ASSESSMENT
I reviewed the results of the Gallium scan with nuclear medicine. The Scan shows increased activity at the lung bases. No increased activity in the right groin    I note decreased WBC count this could be due to either the Vancomycin the patient was on or the Zosyn.      Given the fact the patient was bacteremic and has an endovascular graft I think it would be prudent to give the patient at least 4 weeks of IV antibiotics,    Since only group b strep isolated I would suggest Ampicillin 2 grams every 6 hours  If the WBC continues to decline it may be necessary to change the antibiotic

## 2018-05-01 NOTE — PROGRESS NOTE ADULT - SUBJECTIVE AND OBJECTIVE BOX
Pt is a 89 M Hx of CAD x2 s/p stents, HLD, HTN, AS, GI bleed, essential thrombocytosis, PE on Eliquis, AAA (s/p EVAR 11/16/17) who presented to [facility] ED with complain of fever, chills and left groin erythema x 1day and abdominal pain x2wks.  Pt has been having intermittent abdominal pain for about 2wks and today he told his home health aid that he "didn't feel good" and at the time was noted to have fever of 100.8, chills and his left groin EVAR incision site was noted to be erythematous and so patient was brought to the [facility] ED. According Pt's health aid, he appeared a little bit more distended and was nauseous earlier today, but denies diarrhea, constipation, vomiting, chest pain, SOB, dizziness, or dysuria.    Upon arrival to ED pt /62, , Temp 100.9.  Pt received, 1L fluid bolus and responded to fluid bolus.    Past Medical History:  Aneurysm    CAD (coronary artery disease)    Foot drop, left    GERD (gastroesophageal reflux disease).    Past Surgical History:  Endoleak post (EVAR) endovascular aneurysm repair    H/O heart artery stent  x 2  History of back surgery  x 4  History of cholecystectomy.    Pt.S&E@BS in AM          	  MEDICATIONS:    ampicillin  IVPB      ampicillin  IVPB 2 Gram(s) IV Intermittent every 6 hours      escitalopram 10 milliGRAM(s) Oral daily  temazepam 30 milliGRAM(s) Oral at bedtime PRN    bisacodyl 5 milliGRAM(s) Oral every 12 hours PRN  docusate sodium 100 milliGRAM(s) Oral three times a day  pantoprazole    Tablet 40 milliGRAM(s) Oral before breakfast      apixaban 5 milliGRAM(s) Oral every 12 hours  aspirin  chewable 81 milliGRAM(s) Oral daily  hydroxyurea 500 milliGRAM(s) Oral every 12 hours      Complaint:     Otherwise 12 point review of systems is normal.    PHYSICAL EXAM:    Constitutional: NAD  Eyes: PERRL, EOMI, sclera non-icteric  Neck: supple, no masses, no JVD  Respiratory: CTA b/l, good air entry b/l, no wheezing, rhonchi, rales, +  Cardiovascular: RRR, normal S1S2, no M/R/G  Gastrointestinal: soft, NTND, no masses palpable, BS+  Extremities: :L groin erythema resolved  Neurological: AAOx3      ICU Vital Signs Last 24 Hrs  T(C): 36.8 (01 May 2018 10:01), Max: 36.8 (01 May 2018 05:48)  T(F): 98.2 (01 May 2018 10:01), Max: 98.3 (01 May 2018 05:48)  HR: 72 (01 May 2018 13:15) (55 - 72)  BP: 164/70 (01 May 2018 13:15) (116/57 - 164/70)  BP(mean): --  ABP: --  ABP(mean): --  RR: 18 (01 May 2018 08:30) (18 - 18)  SpO2: 98% (01 May 2018 13:15) (95% - 99%)      ECG:      CHEST X RAY    CT    MRI    MRA    CT ANGIO    CAROTID DUPLEX    DUPLEX     Echocardiogram    Catheterization:    Stress Test:     LABS:	 	  CARDIAC MARKERS:                              8.8    2.5   )-----------( 348      ( 01 May 2018 07:32 )             27.0     05-01    134<L>  |  96  |  17  ----------------------------<  103<H>  4.8   |  32<H>  |  1.12    Ca    8.8      01 May 2018 07:32  Phos  3.2     05-01  Mg     2.3     05-01    TPro  6.8  /  Alb  3.1<L>  /  TBili  0.4  /  DBili  <0.2  /  AST  19  /  ALT  8<L>  /  AlkPhos  61  05-01        ASSESSMENT/PLAN: 	    89 M Hx of CAD x2 s/p stents, HLD, HTN, AS, GI bleed, essential thrombocytosis, PE on Eliquis, AAA (s/p EVAR 11/16/17) who presented to [facility] ED with complain of fever, chills and left groin erythema x 1day and abdominal pain x2wks.  Pt has been having intermittent abdominal pain for about 2wks and today he told his home health aid that he "didn't feel good" and at the time was noted to have fever of 100.8, chills and his left groin EVAR incision site was noted to be erythematous and so patient was brought to the [facility] ED. According Pt's health aid, he appeared a little bit more distended and was nauseous earlier today, but denies diarrhea, constipation, vomiting, chest pain, SOB, dizziness, or dysuria.  Upon arrival to ED pt /62, , Temp 100.9.  Pt received, 1L fluid bolus and responded to fluid bolus.  Blood cx + for GBS.  Repeat blood cx negative.  NM scan showed uptake in the lungs so ct spec was done and showed some activity in the Rt lower lobe.  During the hospital course patient was started on Zosyn with resolve of groin erythema.

## 2018-05-01 NOTE — DISCHARGE NOTE ADULT - PLAN OF CARE
resolve of pneumonia Follow-up with Dr. White in 1-2 weeks in office at [phone]. Please call your doctor if you have a fever of over 101.9.  f/u with ID Dr. Iqbal OOB with home PT  take Augmentin for resolve of pneumonia   Follow-up with Dr. White in 1-2 weeks in office at [phone]. Please call your doctor if you have a fever of over 101.4.

## 2018-05-01 NOTE — DISCHARGE NOTE ADULT - MEDICATION SUMMARY - MEDICATIONS TO TAKE
I will START or STAY ON the medications listed below when I get home from the hospital:    aspirin 81 mg oral tablet, chewable  -- 1 tab(s) by mouth once a day  -- Indication: For home medication    apixaban 5 mg oral tablet  -- 1 tab(s) by mouth every 12 hours, to start in the evening on 12/13 AFTER you have completed the initial loading dose   -- Indication: For pulmonary embolus    Lexapro 10 mg oral tablet  -- 1 tab(s) by mouth once a day  -- Indication: For home medication    hydroxyurea 500 mg oral capsule  -- 1 cap(s) by mouth every 12 hours  -- Indication: For home medication    temazepam 15 mg oral capsule  -- 2 cap(s) by mouth once a day (at bedtime)  -- Indication: For home medication    Colace 100 mg oral capsule  -- orally once a day  -- Indication: For Stool softner    pantoprazole 40 mg oral delayed release tablet  -- 1 tab(s) by mouth once a day (before a meal)  -- Indication: For home medication I will START or STAY ON the medications listed below when I get home from the hospital:    aspirin 81 mg oral tablet, chewable  -- 1 tab(s) by mouth once a day  -- Indication: For home medication    apixaban 5 mg oral tablet  -- 1 tab(s) by mouth every 12 hours, to start in the evening on 12/13 AFTER you have completed the initial loading dose   -- Indication: For pulmonary embolus    Lexapro 10 mg oral tablet  -- 1 tab(s) by mouth once a day  -- Indication: For home medication    hydroxyurea 500 mg oral capsule  -- 1 cap(s) by mouth every 12 hours  -- Indication: For home medication    temazepam 15 mg oral capsule  -- 2 cap(s) by mouth once a day (at bedtime)  -- Indication: For home medication    Colace 100 mg oral capsule  -- orally once a day  -- Indication: For Stool softner    Augmentin 875 mg-125 mg oral tablet  -- 1 tab(s) by mouth 2 times a day   -- Finish all this medication unless otherwise directed by prescriber.  Take with food or milk.    -- Indication: For Infection    pantoprazole 40 mg oral delayed release tablet  -- 1 tab(s) by mouth once a day (before a meal)  -- Indication: For GERD

## 2018-05-01 NOTE — DISCHARGE NOTE ADULT - CARE PROVIDER_API CALL
Haile White), Surgery; Vascular Surgery  [address]  [address]  [place], [place] [address]  Phone: [phone]  Fax: [phone]

## 2018-05-01 NOTE — DISCHARGE NOTE ADULT - HOSPITAL COURSE
89 M Hx of CAD x2 s/p stents, HLD, HTN, AS, GI bleed, essential thrombocytosis, PE on Eliquis, AAA (s/p EVAR 11/16/17) who presented to [facility] ED with complain of fever, chills and left groin erythema x 1day and abdominal pain x2wks.  Pt has been having intermittent abdominal pain for about 2wks and today he told his home health aid that he "didn't feel good" and at the time was noted to have fever of 100.8, chills and his left groin EVAR incision site was noted to be erythematous and so patient was brought to the [facility] ED. According Pt's health aid, he appeared a little bit more distended and was nauseous earlier today, but denies diarrhea, constipation, vomiting, chest pain, SOB, dizziness, or dysuria.  Upon arrival to ED pt /62, , Temp 100.9.  Pt received, 1L fluid bolus and responded to fluid bolus.  Blood cx + for GBS.  Repeat blood cx negative.  NM scan showed uptake in the lungs so ct spec was done and showed some activity in the Rt lower lobe.  During the hospital course patient was started on Zosyn with resolve of groin erythema.  On day of discharge patient was stable to be d/c'd home.

## 2018-05-01 NOTE — DISCHARGE NOTE ADULT - PATIENT PORTAL LINK FT
You can access the [facility] Portal, offered by [facility], by registering with the following website: [email or web address]

## 2018-05-01 NOTE — PROGRESS NOTE ADULT - SUBJECTIVE AND OBJECTIVE BOX
Physical Medicine and Rehabilitation Progress Note    WELLINGTON GARCIA    MRN-[id number]    Patient is a 89y old  Male who presents with a chief complaint of Endoleak/Left groin cellulitis (01 May 2018 12:08)      Vital Signs Last 24 Hrs  T(C): 36.8 (01 May 2018 10:01), Max: 36.8 (01 May 2018 05:48)  T(F): 98.2 (01 May 2018 10:01), Max: 98.3 (01 May 2018 05:48)  HR: 61 (01 May 2018 06:56) (55 - 73)  BP: 116/57 (01 May 2018 08:30) (116/57 - 173/73)  BP(mean): --  RR: 18 (01 May 2018 08:30) (18 - 18)  SpO2: 97% (01 May 2018 08:30) (95% - 99%)    Current Functional Status in Physical Therapy:  lying in bed.  More alert and in good spirits.  On PT.  No groin pain    Bed Mobility:  moderate assistance    Transfers: minimal assistance of 2    Ambulation:  minimal assistance with rolling walker for short distance      Impression:  1. s/o  Lt  groin cellulitis  2. Lt. foot drop /s/p back surgery      Recommendations:  1. Continue PT for therapeutic ex.  and  gait training  2. home PT / HHA, 24 hrs.

## 2018-05-01 NOTE — DISCHARGE NOTE ADULT - CARE PLAN
Principal Discharge DX:	Streptococcal bacteremia  Goal:	resolve of pneumonia  Assessment and plan of treatment:	Follow-up with Dr. White in 1-2 weeks in office at [phone]. Please call your doctor if you have a fever of over 101.9.  f/u with ID Dr. Iqbal Principal Discharge DX:	Streptococcal bacteremia  Goal:	resolve of pneumonia  Assessment and plan of treatment:	OOB with home PT  take Augmentin for resolve of pneumonia   Follow-up with Dr. White in 1-2 weeks in office at [phone]. Please call your doctor if you have a fever of over 101.4.

## 2018-05-01 NOTE — PROGRESS NOTE ADULT - PROBLEM SELECTOR PLAN 1
1) Vascular surgery f/u  2) Gallium scan -pending results to r/o Endovascular infection  3) Repeated Blood culture negative 24h  4) TTE negative for vegetation, if possible ANA if repeated Blood culture positive  5) Blood culture sensitive to Ampicillin, continue Zosyn 2.25gr IV ,d/c  Vancomycin
Patient going to get rescanned today    I note falling WBC count. This may be due to one of the antibiotics.  Patient currently off Vancomycin. If WBC continues to fall may need to stop the Zosyn
Discontinue IV Zosyn  Start ampicillin 2 grams every 6 hours

## 2018-05-01 NOTE — PROGRESS NOTE ADULT - SUBJECTIVE AND OBJECTIVE BOX
covering for Dr Viera    INTERVAL HPI/OVERNIGHT EVENTS:    ANTIBIOTICS    MEDICATIONS  (STANDING):  apixaban 5 milliGRAM(s) Oral every 12 hours  aspirin  chewable 81 milliGRAM(s) Oral daily  docusate sodium 100 milliGRAM(s) Oral three times a day  escitalopram 10 milliGRAM(s) Oral daily  hydroxyurea 500 milliGRAM(s) Oral every 12 hours  pantoprazole    Tablet 40 milliGRAM(s) Oral before breakfast  piperacillin/tazobactam IVPB. 2.25 Gram(s) IV Intermittent every 6 hours    MEDICATIONS  (PRN):  bisacodyl 5 milliGRAM(s) Oral every 12 hours PRN Constipation  temazepam 30 milliGRAM(s) Oral at bedtime PRN Insomnia      Allergies    No Known Allergies    Intolerances        REVIEW OF SYSTEMS:    Constitutional: No fever, weight loss or fatigue  Eyes: No eye pain, visual disturbances, or discharge  ENMT:  No difficulty hearing, tinnitus, vertigo; No sinus or throat pain  Neck: No pain or stiffness  Respiratory: No cough, wheezing, chills or hemoptysis  Cardiovascular: No chest pain, palpitations, shortness of breath, dizziness or leg swelling  Gastrointestinal: No abdominal or epigastric pain. No nausea, vomiting or hematemesis; No diarrhea or constipation. No melena or hematochezia.  Genitourinary: No dysuria, frequency, hematuria or incontinence  Rectal: No pain, hemorrhoids or incontinence  Neurological: No headaches, memory loss, loss of strength, numbness or tremors  Skin: No itching, burning, rashes or lesions     Vital Signs Last 24 Hrs  T(C): 36.8 (01 May 2018 10:01), Max: 36.8 (01 May 2018 05:48)  T(F): 98.2 (01 May 2018 10:01), Max: 98.3 (01 May 2018 05:48)  HR: 61 (01 May 2018 06:56) (55 - 73)  BP: 116/57 (01 May 2018 08:30) (116/57 - 173/73)  BP(mean): --  RR: 18 (01 May 2018 08:30) (18 - 18)  SpO2: 97% (01 May 2018 08:30) (95% - 99%)    PHYSICAL EXAM:    General: Well developed; well nourished; in no acute distress  Eyes: PERRL, EOM intact; conjunctiva and sclera clear  Head: Normocephalic; atraumatic  ENMT: No nasal discharge; airway clear  Neck: Supple; non tender; no masses  Respiratory: No wheezes, rales or rhonchi  Cardiovascular: Regular rate and rhythm. S1 and S2 Normal; No murmurs, gallops or rubs  Gastrointestinal: Soft non-tender non-distended; Normal bowel sounds; No hepatosplenomegaly  Genitourinary: No costovertebral angle tenderness  Extremities: Normal range of motion, No clubbing, cyanosis or edema  Vascular: Peripheral pulses palpable 2+ bilaterally  Neurological: Alert and oriented x3  Skin: Warm and dry. No acute rash  Lymph Nodes: No acute cervical adenopathy  Musculoskeletal: Normal gait, tone, without deformities    LABS:                        8.8    2.5   )-----------( 348      ( 01 May 2018 07:32 )             27.0     05-01    134<L>  |  96  |  17  ----------------------------<  103<H>  4.8   |  32<H>  |  1.12    Ca    8.8      01 May 2018 07:32  Phos  3.2     05-01  Mg     2.3     05-01    TPro  6.8  /  Alb  3.1<L>  /  TBili  0.4  /  DBili  <0.2  /  AST  19  /  ALT  8<L>  /  AlkPhos  61  05-01    PTT - ( 01 May 2018 07:32 )  PTT:83.6 sec        MICROBIOLOGY:  Culture Results:   No growth at 5 days. (04-26 @ 11:39)  Culture Results:   No growth at 5 days. (04-26 @ 11:39)  Culture Results:   Growth in aerobic and anaerobic bottles: Streptococcus agalactiae (Group  B) (04-25 @ 09:26)  Culture Results:   Growth in aerobic and anaerobic bottles: Streptococcus agalactiae (Group  B) (04-25 @ 09:26)  Culture Results:   No growth (04-25 @ 08:44)      RADIOLOGY & ADDITIONAL STUDIES:

## 2018-05-01 NOTE — PROGRESS NOTE ADULT - SUBJECTIVE AND OBJECTIVE BOX
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 2.25  aspirin  chewable 81  heparin  Infusion 1300  piperacillin/tazobactam IVPB. 2.25        Vital Signs Last 24 Hrs  T(C): 36.8 (01 May 2018 05:48), Max: 36.9 (30 Apr 2018 08:50)  T(F): 98.3 (01 May 2018 05:48), Max: 98.4 (30 Apr 2018 08:50)  HR: 56 (01 May 2018 00:00) (55 - 76)  BP: 128/57 (01 May 2018 00:00) (121/56 - 173/73)  BP(mean): --  RR: 18 (01 May 2018 00:00) (17 - 18)  SpO2: 98% (01 May 2018 00:00) (95% - 99%)  I&O's Summary    29 Apr 2018 07:01  -  30 Apr 2018 07:00  --------------------------------------------------------  IN: 536 mL / OUT: 350 mL / NET: 186 mL    30 Apr 2018 07:01  -  01 May 2018 06:55  --------------------------------------------------------  IN: 396 mL / OUT: 350 mL / NET: 46 mL        Physical Exam:  General: NAD, resting comfortably in bed  Pulmonary: Nonlabored breathing, no respiratory distress  Gastrointestinal: soft, NTND  Extremities: : L groin erythema resolved  Neurological: AAOx3      LABS:                        8.4    2.9   )-----------( 347      ( 30 Apr 2018 07:24 )             26.6     04-30    140  |  99  |  17  ----------------------------<  116<H>  4.8   |  32<H>  |  1.15    Ca    8.5      30 Apr 2018 07:24  Phos  3.0     04-30  Mg     2.2     04-30      PTT - ( 01 May 2018 00:48 )  PTT:77.1 sec    Radiology and Additional Studies:    Assessment and Plan:     Assessment/Plan;  89 y/o M with Hx of CAD x2 s/p stents, HLD, HTN, AS, GI bleed, PE on Eliquis, AAA (s/p EVAR 11/16/17) now with endoleak and 1.0 cm penetrating atherosclerotic ulcer and left groin cellulitis    Zosyn  Home medication  regular diet  F/u AM labs  f/u Gallium Scan read  f/u ct spec read  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

## 2018-05-02 VITALS — TEMPERATURE: 98 F

## 2018-05-02 LAB
ANION GAP SERPL CALC-SCNC: 10 MMOL/L — SIGNIFICANT CHANGE UP (ref 5–17)
BASOPHILS NFR BLD AUTO: 0.9 % — SIGNIFICANT CHANGE UP (ref 0–2)
BUN SERPL-MCNC: 22 MG/DL — SIGNIFICANT CHANGE UP (ref 7–23)
CALCIUM SERPL-MCNC: 8.8 MG/DL — SIGNIFICANT CHANGE UP (ref 8.4–10.5)
CHLORIDE SERPL-SCNC: 94 MMOL/L — LOW (ref 96–108)
CO2 SERPL-SCNC: 32 MMOL/L — HIGH (ref 22–31)
CREAT SERPL-MCNC: 1.05 MG/DL — SIGNIFICANT CHANGE UP (ref 0.5–1.3)
EOSINOPHIL NFR BLD AUTO: 3.1 % — SIGNIFICANT CHANGE UP (ref 0–6)
ERYTHROCYTE [SEDIMENTATION RATE] IN BLOOD: >130 MM/HR — HIGH
GLUCOSE SERPL-MCNC: 96 MG/DL — SIGNIFICANT CHANGE UP (ref 70–99)
HCT VFR BLD CALC: 27.3 % — LOW (ref 39–50)
HGB BLD-MCNC: 8.8 G/DL — LOW (ref 13–17)
LYMPHOCYTES # BLD AUTO: 20.6 % — SIGNIFICANT CHANGE UP (ref 13–44)
MAGNESIUM SERPL-MCNC: 2.3 MG/DL — SIGNIFICANT CHANGE UP (ref 1.6–2.6)
MCHC RBC-ENTMCNC: 32.2 G/DL — SIGNIFICANT CHANGE UP (ref 32–36)
MCHC RBC-ENTMCNC: 35.5 PG — HIGH (ref 27–34)
MCV RBC AUTO: 110.1 FL — HIGH (ref 80–100)
MONOCYTES NFR BLD AUTO: 11.8 % — SIGNIFICANT CHANGE UP (ref 2–14)
NEUTROPHILS NFR BLD AUTO: 63.6 % — SIGNIFICANT CHANGE UP (ref 43–77)
PHOSPHATE SERPL-MCNC: 3.2 MG/DL — SIGNIFICANT CHANGE UP (ref 2.5–4.5)
PLATELET # BLD AUTO: 294 K/UL — SIGNIFICANT CHANGE UP (ref 150–400)
POTASSIUM SERPL-MCNC: 4.5 MMOL/L — SIGNIFICANT CHANGE UP (ref 3.5–5.3)
POTASSIUM SERPL-SCNC: 4.5 MMOL/L — SIGNIFICANT CHANGE UP (ref 3.5–5.3)
RBC # BLD: 2.48 M/UL — LOW (ref 4.2–5.8)
RBC # FLD: 17.7 % — HIGH (ref 10.3–16.9)
SODIUM SERPL-SCNC: 136 MMOL/L — SIGNIFICANT CHANGE UP (ref 135–145)
WBC # BLD: 2.3 K/UL — LOW (ref 3.8–10.5)
WBC # FLD AUTO: 2.3 K/UL — LOW (ref 3.8–10.5)

## 2018-05-02 PROCEDURE — 71045 X-RAY EXAM CHEST 1 VIEW: CPT

## 2018-05-02 PROCEDURE — 80048 BASIC METABOLIC PNL TOTAL CA: CPT

## 2018-05-02 PROCEDURE — 86901 BLOOD TYPING SEROLOGIC RH(D): CPT

## 2018-05-02 PROCEDURE — 81003 URINALYSIS AUTO W/O SCOPE: CPT

## 2018-05-02 PROCEDURE — 80202 ASSAY OF VANCOMYCIN: CPT

## 2018-05-02 PROCEDURE — 83605 ASSAY OF LACTIC ACID: CPT

## 2018-05-02 PROCEDURE — 93306 TTE W/DOPPLER COMPLETE: CPT

## 2018-05-02 PROCEDURE — 99285 EMERGENCY DEPT VISIT HI MDM: CPT | Mod: 25

## 2018-05-02 PROCEDURE — P9016: CPT

## 2018-05-02 PROCEDURE — 86923 COMPATIBILITY TEST ELECTRIC: CPT

## 2018-05-02 PROCEDURE — 85027 COMPLETE CBC AUTOMATED: CPT

## 2018-05-02 PROCEDURE — 96375 TX/PRO/DX INJ NEW DRUG ADDON: CPT

## 2018-05-02 PROCEDURE — 84100 ASSAY OF PHOSPHORUS: CPT

## 2018-05-02 PROCEDURE — 85730 THROMBOPLASTIN TIME PARTIAL: CPT

## 2018-05-02 PROCEDURE — 87184 SC STD DISK METHOD PER PLATE: CPT

## 2018-05-02 PROCEDURE — 83735 ASSAY OF MAGNESIUM: CPT

## 2018-05-02 PROCEDURE — 74174 CTA ABD&PLVS W/CONTRAST: CPT

## 2018-05-02 PROCEDURE — A9556: CPT

## 2018-05-02 PROCEDURE — 97161 PT EVAL LOW COMPLEX 20 MIN: CPT

## 2018-05-02 PROCEDURE — 36430 TRANSFUSION BLD/BLD COMPNT: CPT

## 2018-05-02 PROCEDURE — 83690 ASSAY OF LIPASE: CPT

## 2018-05-02 PROCEDURE — 36415 COLL VENOUS BLD VENIPUNCTURE: CPT

## 2018-05-02 PROCEDURE — 86850 RBC ANTIBODY SCREEN: CPT

## 2018-05-02 PROCEDURE — 97112 NEUROMUSCULAR REEDUCATION: CPT

## 2018-05-02 PROCEDURE — 86900 BLOOD TYPING SEROLOGIC ABO: CPT

## 2018-05-02 PROCEDURE — 78802 RP LOCLZJ TUM WHBDY 1 D IMG: CPT

## 2018-05-02 PROCEDURE — 84484 ASSAY OF TROPONIN QUANT: CPT

## 2018-05-02 PROCEDURE — 87086 URINE CULTURE/COLONY COUNT: CPT

## 2018-05-02 PROCEDURE — 85652 RBC SED RATE AUTOMATED: CPT

## 2018-05-02 PROCEDURE — 87040 BLOOD CULTURE FOR BACTERIA: CPT

## 2018-05-02 PROCEDURE — 80053 COMPREHEN METABOLIC PANEL: CPT

## 2018-05-02 PROCEDURE — 97110 THERAPEUTIC EXERCISES: CPT

## 2018-05-02 PROCEDURE — 82803 BLOOD GASES ANY COMBINATION: CPT

## 2018-05-02 PROCEDURE — 93005 ELECTROCARDIOGRAM TRACING: CPT

## 2018-05-02 PROCEDURE — 78803 RP LOCLZJ TUM SPECT 1 AREA: CPT

## 2018-05-02 PROCEDURE — 93970 EXTREMITY STUDY: CPT

## 2018-05-02 PROCEDURE — 80076 HEPATIC FUNCTION PANEL: CPT

## 2018-05-02 PROCEDURE — 96374 THER/PROPH/DIAG INJ IV PUSH: CPT | Mod: XU

## 2018-05-02 PROCEDURE — 87150 DNA/RNA AMPLIFIED PROBE: CPT

## 2018-05-02 PROCEDURE — 85025 COMPLETE CBC W/AUTO DIFF WBC: CPT

## 2018-05-02 PROCEDURE — 85610 PROTHROMBIN TIME: CPT

## 2018-05-02 RX ADMIN — PANTOPRAZOLE SODIUM 40 MILLIGRAM(S): 20 TABLET, DELAYED RELEASE ORAL at 06:06

## 2018-05-02 RX ADMIN — Medication 81 MILLIGRAM(S): at 10:46

## 2018-05-02 RX ADMIN — Medication 100 MILLIGRAM(S): at 10:46

## 2018-05-02 RX ADMIN — ESCITALOPRAM OXALATE 10 MILLIGRAM(S): 10 TABLET, FILM COATED ORAL at 10:46

## 2018-05-02 RX ADMIN — HYDROXYUREA 500 MILLIGRAM(S): 500 CAPSULE ORAL at 10:45

## 2018-05-02 RX ADMIN — Medication 216 GRAM(S): at 00:19

## 2018-05-02 RX ADMIN — APIXABAN 5 MILLIGRAM(S): 2.5 TABLET, FILM COATED ORAL at 06:06

## 2018-05-02 RX ADMIN — Medication 216 GRAM(S): at 06:06

## 2018-05-02 RX ADMIN — Medication 100 MILLIGRAM(S): at 06:06

## 2018-05-02 NOTE — PROGRESS NOTE ADULT - NSHPATTENDINGPLANDISCUSS_GEN_ALL_CORE
Dr.Pamoukian ZHANG
Roe LAMAS
Zion Alcocer
patient and his HHA
Primary team
patient and surgical staff
patient and surgical staff

## 2018-05-02 NOTE — PROGRESS NOTE ADULT - PROVIDER SPECIALTY LIST ADULT
Infectious Disease
Internal Medicine
Intervent Cardiology
Rehab Medicine
Vascular Surgery
Infectious Disease
Infectious Disease

## 2018-05-02 NOTE — PROGRESS NOTE ADULT - SUBJECTIVE AND OBJECTIVE BOX
24hr Events:  O/N: CURTIS, VSS  5/1: Heparin gtt dc'd, Eliquis restarted. Dr. Iqbal thinks 4 weeks of IV ABX necessary, d/cd zosyn and started ampicillin, SPECT-CT read as negative.       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 Dr. Iqbal thinks 4 weeks of IV ABX necessary, d/cd zosyn and started ampicillin, SPECT-CT read as negative.     ampicillin  IVPB   ampicillin  IVPB 2  ampicillin  IVPB   ampicillin  IVPB 2  apixaban 5  aspirin  chewable 81        Vital Signs Last 24 Hrs  T(C): 36.4 (02 May 2018 05:51), Max: 36.8 (01 May 2018 10:01)  T(F): 97.5 (02 May 2018 05:51), Max: 98.2 (01 May 2018 10:01)  HR: 58 (02 May 2018 00:34) (55 - 72)  BP: 114/56 (02 May 2018 00:34) (114/56 - 164/70)  BP(mean): --  RR: 18 (02 May 2018 00:34) (17 - 18)  SpO2: 98% (02 May 2018 00:34) (97% - 99%)  I&O's Summary    30 Apr 2018 07:01  -  01 May 2018 07:00  --------------------------------------------------------  IN: 792 mL / OUT: 350 mL / NET: 442 mL    01 May 2018 07:01  -  02 May 2018 06:53  --------------------------------------------------------  IN: 280 mL / OUT: 0 mL / NET: 280 mL        Physical Exam:  General: NAD, resting comfortably in bed  Pulmonary: Nonlabored breathing, no respiratory distress  Gastrointestinal: soft, NTND  Extremities: WWP  Neurological: AAOx3    LABS:                        8.8    2.5   )-----------( 348      ( 01 May 2018 07:32 )             27.0     05-01    134<L>  |  96  |  17  ----------------------------<  103<H>  4.8   |  32<H>  |  1.12    Ca    8.8      01 May 2018 07:32  Phos  3.2     05-01  Mg     2.3     05-01    TPro  6.8  /  Alb  3.1<L>  /  TBili  0.4  /  DBili  <0.2  /  AST  19  /  ALT  8<L>  /  AlkPhos  61  05-01    PTT - ( 01 May 2018 07:32 )  PTT:83.6 sec    Radiology and Additional Studies:    Assessment and Plan:     Assessment/Plan;  89 y/o M with Hx of CAD x2 s/p stents, HLD, HTN, AS, GI bleed, PE on Eliquis, AAA (s/p EVAR 11/16/17) now with endoleak and 1.0 cm penetrating atherosclerotic ulcer and left groin cellulitis    Ampicillin  Home medication  regular diet  F/u AM labs  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  plan for d/c today

## 2018-05-02 NOTE — PROGRESS NOTE ADULT - SUBJECTIVE AND OBJECTIVE BOX
Pt is a 89 M Hx of CAD x2 s/p stents, HLD, HTN, AS, GI bleed, essential thrombocytosis, PE on Eliquis, AAA (s/p EVAR 11/16/17) who presented to [facility] ED with complain of fever, chills and left groin erythema x 1day and abdominal pain x2wks.  Pt has been having intermittent abdominal pain for about 2wks and today he told his home health aid that he "didn't feel good" and at the time was noted to have fever of 100.8, chills and his left groin EVAR incision site was noted to be erythematous and so patient was brought to the [facility] ED. According Pt's health aid, he appeared a little bit more distended and was nauseous earlier today, but denies diarrhea, constipation, vomiting, chest pain, SOB, dizziness, or dysuria.    Upon arrival to ED pt /62, , Temp 100.9.  Pt received, 1L fluid bolus and responded to fluid bolus.    Past Medical History:  Aneurysm    CAD (coronary artery disease)    Foot drop, left    GERD (gastroesophageal reflux disease).    Past Surgical History:  Endoleak post (EVAR) endovascular aneurysm repair    H/O heart artery stent  x 2  History of back surgery  x 4  History of cholecystectomy.    Pt.S&E@BS in AM          	  MEDICATIONS:    ampicillin  IVPB      ampicillin  IVPB 2 Gram(s) IV Intermittent every 6 hours      escitalopram 10 milliGRAM(s) Oral daily  temazepam 30 milliGRAM(s) Oral at bedtime PRN    bisacodyl 5 milliGRAM(s) Oral every 12 hours PRN  docusate sodium 100 milliGRAM(s) Oral three times a day  pantoprazole    Tablet 40 milliGRAM(s) Oral before breakfast      apixaban 5 milliGRAM(s) Oral every 12 hours  aspirin  chewable 81 milliGRAM(s) Oral daily  hydroxyurea 500 milliGRAM(s) Oral every 12 hours      Complaint:     Otherwise 12 point review of systems is normal.    PHYSICAL EXAM:      Constitutional: NAD  Eyes: PERRL, EOMI, sclera non-icteric  Neck: supple, no masses, no JVD  Respiratory: CTA b/l, good air entry b/l, no wheezing, rhonchi, rales, +  Cardiovascular: RRR, normal S1S2, no M/R/G  Gastrointestinal: soft, NTND, no masses palpable, BS+  Extremities: :L groin erythema resolved  Neurological: AAOx3          ICU Vital Signs Last 24 Hrs  T(C): 36.9 (02 May 2018 09:40), Max: 36.9 (02 May 2018 09:40)  T(F): 98.4 (02 May 2018 09:40), Max: 98.4 (02 May 2018 09:40)  HR: 70 (02 May 2018 08:52) (55 - 70)  BP: 159/67 (02 May 2018 08:52) (114/56 - 166/78)  BP(mean): --  ABP: --  ABP(mean): --  RR: 18 (02 May 2018 08:52) (18 - 18)  SpO2: 97% (02 May 2018 08:52) (97% - 98%)          ECG:      CHEST X RAY    CT    MRI    MRA    CT ANGIO    CAROTID DUPLEX    DUPLEX     Echocardiogram    Catheterization:    Stress Test:     LABS:	 	  CARDIAC MARKERS:                              8.8    2.3   )-----------( 294      ( 02 May 2018 07:23 )             27.3     05-02    136  |  94<L>  |  22  ----------------------------<  96  4.5   |  32<H>  |  1.05    Ca    8.8      02 May 2018 07:23  Phos  3.2     05-02  Mg     2.3     05-02    TPro  6.8  /  Alb  3.1<L>  /  TBili  0.4  /  DBili  <0.2  /  AST  19  /  ALT  8<L>  /  AlkPhos  61  05-01          ASSESSMENT/PLAN: 	    89 M Hx of CAD x2 s/p stents, HLD, HTN, AS, GI bleed, essential thrombocytosis, PE on Eliquis, AAA (s/p EVAR 11/16/17) who presented to [facility] ED with complain of fever, chills and left groin erythema x 1day and abdominal pain x2wks.  Pt has been having intermittent abdominal pain for about 2wks and today he told his home health aid that he "didn't feel good" and at the time was noted to have fever of 100.8, chills and his left groin EVAR incision site was noted to be erythematous and so patient was brought to the [facility] ED. According Pt's health aid, he appeared a little bit more distended and was nauseous earlier today, but denies diarrhea, constipation, vomiting, chest pain, SOB, dizziness, or dysuria.  Upon arrival to ED pt /62, , Temp 100.9.  Pt received, 1L fluid bolus and responded to fluid bolus.  Blood cx + for GBS.  Repeat blood cx negative.  NM scan showed uptake in the lungs so ct spec was done and showed some activity in the Rt lower lobe.  During the hospital course patient was started on Zosyn with resolve of groin erythema.

## 2018-05-03 ENCOUNTER — APPOINTMENT (OUTPATIENT)
Dept: PSYCHIATRY | Facility: CLINIC | Age: 83
End: 2018-05-03

## 2018-05-08 DIAGNOSIS — I35.0 NONRHEUMATIC AORTIC (VALVE) STENOSIS: ICD-10-CM

## 2018-05-08 DIAGNOSIS — I25.10 ATHEROSCLEROTIC HEART DISEASE OF NATIVE CORONARY ARTERY WITHOUT ANGINA PECTORIS: ICD-10-CM

## 2018-05-08 DIAGNOSIS — L03.90 CELLULITIS, UNSPECIFIED: ICD-10-CM

## 2018-05-08 DIAGNOSIS — E78.5 HYPERLIPIDEMIA, UNSPECIFIED: ICD-10-CM

## 2018-05-08 DIAGNOSIS — Z86.711 PERSONAL HISTORY OF PULMONARY EMBOLISM: ICD-10-CM

## 2018-05-08 DIAGNOSIS — D72.819 DECREASED WHITE BLOOD CELL COUNT, UNSPECIFIED: ICD-10-CM

## 2018-05-08 DIAGNOSIS — Z98.890 OTHER SPECIFIED POSTPROCEDURAL STATES: ICD-10-CM

## 2018-05-08 DIAGNOSIS — I10 ESSENTIAL (PRIMARY) HYPERTENSION: ICD-10-CM

## 2018-05-08 DIAGNOSIS — Z90.49 ACQUIRED ABSENCE OF OTHER SPECIFIED PARTS OF DIGESTIVE TRACT: ICD-10-CM

## 2018-05-08 DIAGNOSIS — Z95.5 PRESENCE OF CORONARY ANGIOPLASTY IMPLANT AND GRAFT: ICD-10-CM

## 2018-05-08 DIAGNOSIS — T82.338A: ICD-10-CM

## 2018-05-08 DIAGNOSIS — Z79.82 LONG TERM (CURRENT) USE OF ASPIRIN: ICD-10-CM

## 2018-05-08 DIAGNOSIS — Z79.01 LONG TERM (CURRENT) USE OF ANTICOAGULANTS: ICD-10-CM

## 2018-05-08 DIAGNOSIS — R78.81 BACTEREMIA: ICD-10-CM

## 2018-05-08 DIAGNOSIS — I71.4 ABDOMINAL AORTIC ANEURYSM, WITHOUT RUPTURE: ICD-10-CM

## 2018-05-08 DIAGNOSIS — M21.372 FOOT DROP, LEFT FOOT: ICD-10-CM

## 2018-05-08 DIAGNOSIS — K21.9 GASTRO-ESOPHAGEAL REFLUX DISEASE WITHOUT ESOPHAGITIS: ICD-10-CM

## 2018-05-08 DIAGNOSIS — B95.1 STREPTOCOCCUS, GROUP B, AS THE CAUSE OF DISEASES CLASSIFIED ELSEWHERE: ICD-10-CM

## 2018-05-10 ENCOUNTER — APPOINTMENT (OUTPATIENT)
Dept: PSYCHIATRY | Facility: CLINIC | Age: 83
End: 2018-05-10

## 2018-05-17 ENCOUNTER — APPOINTMENT (OUTPATIENT)
Dept: PSYCHIATRY | Facility: CLINIC | Age: 83
End: 2018-05-17

## 2018-05-24 ENCOUNTER — APPOINTMENT (OUTPATIENT)
Dept: PSYCHIATRY | Facility: CLINIC | Age: 83
End: 2018-05-24

## 2018-05-31 ENCOUNTER — APPOINTMENT (OUTPATIENT)
Dept: PSYCHIATRY | Facility: CLINIC | Age: 83
End: 2018-05-31

## 2018-06-04 NOTE — ED PROVIDER NOTE - NS ED MD DISPO ISOLATION TYPES
How Severe Is Your Skin Cancer?: mild
Is This A New Presentation, Or A Follow-Up?: Follow Up Basal Cell Carcinoma
When Was Basal Cell Biopsied? (Optional): 5/2017
Additional History: Treated with LN2 5/2017
None

## 2018-06-07 ENCOUNTER — APPOINTMENT (OUTPATIENT)
Dept: PSYCHIATRY | Facility: CLINIC | Age: 83
End: 2018-06-07

## 2018-06-14 ENCOUNTER — APPOINTMENT (OUTPATIENT)
Dept: PSYCHIATRY | Facility: CLINIC | Age: 83
End: 2018-06-14

## 2018-06-21 ENCOUNTER — APPOINTMENT (OUTPATIENT)
Dept: PSYCHIATRY | Facility: CLINIC | Age: 83
End: 2018-06-21

## 2018-06-28 ENCOUNTER — APPOINTMENT (OUTPATIENT)
Dept: PSYCHIATRY | Facility: CLINIC | Age: 83
End: 2018-06-28

## 2018-12-18 NOTE — ED ADULT NURSE NOTE - NS ED NURSE LEVEL OF CONSCIOUSNESS AFFECT
Interval History: The pt is doing well. He's producing a lot of sputum. No hematemesis. He is very hungry    Review of Systems   Constitutional: Negative for activity change, appetite change, chills and fever.   HENT: Negative for congestion and mouth sores.    Respiratory: Positive for cough. Negative for chest tightness, shortness of breath and wheezing.    Cardiovascular: Negative.  Negative for chest pain, palpitations and leg swelling.   Gastrointestinal: Negative.  Negative for abdominal distention, constipation, diarrhea, nausea and vomiting.   Genitourinary: Negative for dysuria and frequency.   Musculoskeletal: Negative.  Negative for arthralgias, back pain and gait problem.   Skin: Negative.  Negative for rash and wound.   Neurological: Negative for dizziness, facial asymmetry and numbness.   Psychiatric/Behavioral: Negative for agitation, behavioral problems and confusion.     Objective:     Vital Signs (Most Recent):  Temp: 99.1 °F (37.3 °C) (12/18/18 0730)  Pulse: 77 (12/18/18 0730)  Resp: 20 (12/18/18 0730)  BP: 123/60 (12/18/18 0730)  SpO2: (!) 93 % (12/18/18 0730) Vital Signs (24h Range):  Temp:  [97.9 °F (36.6 °C)-101.5 °F (38.6 °C)] 99.1 °F (37.3 °C)  Pulse:  [] 77  Resp:  [14-62] 20  SpO2:  [83 %-99 %] 93 %  BP: ()/(50-97) 123/60     Weight: 71.1 kg (156 lb 12 oz)  Body mass index is 24.55 kg/m².    Intake/Output Summary (Last 24 hours) at 12/18/2018 0802  Last data filed at 12/18/2018 0600  Gross per 24 hour   Intake 233.33 ml   Output 2250 ml   Net -2016.67 ml      Physical Exam   Constitutional: He is oriented to person, place, and time. He appears well-developed and well-nourished.   Cardiovascular: Normal rate, regular rhythm, normal heart sounds and intact distal pulses. Exam reveals no gallop and no friction rub.   No murmur heard.  Pulmonary/Chest: Effort normal and breath sounds normal. No respiratory distress. He has no rales.   Right sided rales   Abdominal: Soft. Bowel  sounds are normal. He exhibits no distension. There is no tenderness. There is no rebound.   Musculoskeletal: Normal range of motion. He exhibits no edema.   Neurological: He is alert and oriented to person, place, and time. No cranial nerve deficit or sensory deficit. He exhibits normal muscle tone. Coordination normal.   Skin: Skin is warm and dry.   Psychiatric: He has a normal mood and affect. His behavior is normal. Judgment and thought content normal.   Vitals reviewed.      Significant Labs:   CBC:   Recent Labs   Lab 12/17/18  0943 12/18/18  0337   WBC 18.90* 15.40*   HGB 13.7* 12.6*   HCT 42.6 39.1*    360*     CMP:   Recent Labs   Lab 12/17/18  0943   *   K 3.9      CO2 27   *   BUN 11   CREATININE 0.8   CALCIUM 10.2   PROT 7.1   ALBUMIN 3.4*   BILITOT 0.5   ALKPHOS 297*   AST 63*   ALT 70*   ANIONGAP 10   EGFRNONAA >60       Significant Imaging: I have reviewed all pertinent imaging results/findings within the past 24 hours.   Calm

## 2021-09-09 NOTE — PHYSICAL THERAPY INITIAL EVALUATION ADULT - DIAGNOSIS, PT EVAL
6A: Primary Prevention/Risk Reduction for Cardiovascular/Pulmonary Disorders Intermediate Repair Preamble Text (Leave Blank If You Do Not Want): Undermining was performed with blunt dissection.

## 2021-09-21 NOTE — ED ADULT NURSE NOTE - NS ED NURSE RECORD ANOTHER HT AND WT
Detail Level: Detailed Quality 110: Preventive Care And Screening: Influenza Immunization: Influenza immunization was not ordered or administered, reason not given Quality 111:Pneumonia Vaccination Status For Older Adults: Pneumococcal Vaccination Previously Received Yes

## 2021-12-28 NOTE — DISCHARGE NOTE ADULT - IF YOU ARE A SMOKER, IT IS IMPORTANT FOR YOUR HEALTH TO STOP SMOKING. PLEASE BE AWARE THAT SECOND HAND SMOKE IS ALSO HARMFUL.
Statement Selected
Assistance with ambulation/Assistance OOB with selected safe patient handling equipment/Communicate Risk of Fall with Harm to all staff/Reinforce activity limits and safety measures with patient and family/Tailored Fall Risk Interventions/Visual Cue: Yellow wristband and red socks/Bed in lowest position, wheels locked, appropriate side rails in place/Call bell, personal items and telephone in reach/Instruct patient to call for assistance before getting out of bed or chair/Non-slip footwear when patient is out of bed/[place] to call system/Physically safe environment - no spills, clutter or unnecessary equipment/Purposeful Proactive Rounding/Room/bathroom lighting operational, light cord in reach

## 2022-06-13 NOTE — H&P ADULT - PROBLEM SELECTOR PLAN 6
Fidelina with [facility] called to check on denial. They asked if we could fax denial letter to them.     Fax: [phone]   c/w home temazepam 15 mg PRN for insomnia c/w lexapro 10 mg PO daily

## 2023-05-15 NOTE — PHYSICAL THERAPY INITIAL EVALUATION ADULT - DISCHARGE DISPOSITION, PT EVAL
and continued 24/7 assistacne/home w/ home PT
